# Patient Record
Sex: FEMALE | Race: WHITE | NOT HISPANIC OR LATINO | Employment: FULL TIME | URBAN - METROPOLITAN AREA
[De-identification: names, ages, dates, MRNs, and addresses within clinical notes are randomized per-mention and may not be internally consistent; named-entity substitution may affect disease eponyms.]

---

## 2023-08-14 DIAGNOSIS — E11.9 TYPE 2 DIABETES MELLITUS WITHOUT COMPLICATIONS (MULTI): ICD-10-CM

## 2023-08-17 RX ORDER — METFORMIN HYDROCHLORIDE 1000 MG/1
1000 TABLET ORAL 2 TIMES DAILY
Qty: 60 TABLET | Refills: 3 | Status: SHIPPED | OUTPATIENT
Start: 2023-08-17

## 2023-08-26 DIAGNOSIS — I10 ESSENTIAL (PRIMARY) HYPERTENSION: ICD-10-CM

## 2023-08-28 RX ORDER — METOPROLOL SUCCINATE 25 MG/1
12.5 TABLET, EXTENDED RELEASE ORAL DAILY
Qty: 90 TABLET | Refills: 3 | Status: SHIPPED | OUTPATIENT
Start: 2023-08-28

## 2023-10-21 DIAGNOSIS — E78.5 HYPERLIPIDEMIA, UNSPECIFIED: ICD-10-CM

## 2023-10-24 RX ORDER — ATORVASTATIN CALCIUM 40 MG/1
40 TABLET, FILM COATED ORAL DAILY
Qty: 90 TABLET | Refills: 3 | Status: SHIPPED | OUTPATIENT
Start: 2023-10-24

## 2024-02-23 DIAGNOSIS — E55.9 VITAMIN D DEFICIENCY, UNSPECIFIED: ICD-10-CM

## 2024-02-28 RX ORDER — LISINOPRIL 40 MG/1
40 TABLET ORAL DAILY
Qty: 90 TABLET | Refills: 3 | OUTPATIENT
Start: 2024-02-28

## 2024-02-28 NOTE — TELEPHONE ENCOUNTER
Lm for pt to call back, pt was seen for new pt physical on 3/06/2023, but never came back for DM follow up 3 months after.

## 2024-08-23 ENCOUNTER — APPOINTMENT (OUTPATIENT)
Dept: RADIOLOGY | Facility: HOSPITAL | Age: 44
DRG: 280 | End: 2024-08-23
Payer: COMMERCIAL

## 2024-08-23 ENCOUNTER — HOSPITAL ENCOUNTER (INPATIENT)
Facility: HOSPITAL | Age: 44
DRG: 280 | End: 2024-08-23
Attending: EMERGENCY MEDICINE | Admitting: STUDENT IN AN ORGANIZED HEALTH CARE EDUCATION/TRAINING PROGRAM
Payer: COMMERCIAL

## 2024-08-23 ENCOUNTER — APPOINTMENT (OUTPATIENT)
Dept: CARDIOLOGY | Facility: HOSPITAL | Age: 44
DRG: 280 | End: 2024-08-23
Payer: COMMERCIAL

## 2024-08-23 DIAGNOSIS — D72.829 LEUKOCYTOSIS, UNSPECIFIED TYPE: ICD-10-CM

## 2024-08-23 DIAGNOSIS — I50.21 ACUTE SYSTOLIC HEART FAILURE (MULTI): ICD-10-CM

## 2024-08-23 DIAGNOSIS — E11.9 TYPE 2 DIABETES MELLITUS WITHOUT COMPLICATIONS (MULTI): ICD-10-CM

## 2024-08-23 DIAGNOSIS — R11.2 NAUSEA AND VOMITING, UNSPECIFIED VOMITING TYPE: Primary | ICD-10-CM

## 2024-08-23 DIAGNOSIS — E11.10 DIABETIC KETOACIDOSIS WITHOUT COMA ASSOCIATED WITH TYPE 2 DIABETES MELLITUS (MULTI): ICD-10-CM

## 2024-08-23 DIAGNOSIS — I10 HYPERTENSION, UNSPECIFIED TYPE: ICD-10-CM

## 2024-08-23 DIAGNOSIS — E78.5 HYPERLIPIDEMIA, UNSPECIFIED: ICD-10-CM

## 2024-08-23 DIAGNOSIS — I21.4 NON-STEMI (NON-ST ELEVATED MYOCARDIAL INFARCTION) (MULTI): ICD-10-CM

## 2024-08-23 LAB
ALBUMIN SERPL-MCNC: 3.2 G/DL (ref 3.5–5)
ALBUMIN SERPL-MCNC: 3.9 G/DL (ref 3.5–5)
ALP BLD-CCNC: 103 U/L (ref 35–125)
ALT SERPL-CCNC: 21 U/L (ref 5–40)
ANION GAP SERPL CALC-SCNC: 16 MMOL/L
ANION GAP SERPL CALC-SCNC: >19 MMOL/L
APPEARANCE UR: CLEAR
APTT PPP: 25.7 SECONDS (ref 22–32.5)
AST SERPL-CCNC: 29 U/L (ref 5–40)
B-OH-BUTYR+ACETOACET BLD-SCNC: 3 MMOL/L (ref 0.1–0.3)
BACTERIA #/AREA URNS AUTO: ABNORMAL /HPF
BASE EXCESS BLDV CALC-SCNC: -5.5 MMOL/L (ref -2–3)
BASOPHILS # BLD AUTO: 0.11 X10*3/UL (ref 0–0.1)
BASOPHILS NFR BLD AUTO: 0.5 %
BILIRUB SERPL-MCNC: 0.6 MG/DL (ref 0.1–1.2)
BILIRUB UR STRIP.AUTO-MCNC: NEGATIVE MG/DL
BODY TEMPERATURE: 37 DEGREES CELSIUS
BUN SERPL-MCNC: 23 MG/DL (ref 8–25)
BUN SERPL-MCNC: 25 MG/DL (ref 8–25)
CALCIUM SERPL-MCNC: 10 MG/DL (ref 8.5–10.4)
CALCIUM SERPL-MCNC: 8.4 MG/DL (ref 8.5–10.4)
CHLORIDE SERPL-SCNC: 101 MMOL/L (ref 97–107)
CHLORIDE SERPL-SCNC: 98 MMOL/L (ref 97–107)
CO2 SERPL-SCNC: 17 MMOL/L (ref 24–31)
CO2 SERPL-SCNC: 18 MMOL/L (ref 24–31)
COLOR UR: ABNORMAL
CREAT SERPL-MCNC: 1.1 MG/DL (ref 0.4–1.6)
CREAT SERPL-MCNC: 1.2 MG/DL (ref 0.4–1.6)
EGFRCR SERPLBLD CKD-EPI 2021: 57 ML/MIN/1.73M*2
EGFRCR SERPLBLD CKD-EPI 2021: 64 ML/MIN/1.73M*2
EOSINOPHIL # BLD AUTO: 0.07 X10*3/UL (ref 0–0.7)
EOSINOPHIL NFR BLD AUTO: 0.3 %
ERYTHROCYTE [DISTWIDTH] IN BLOOD BY AUTOMATED COUNT: 15.2 % (ref 11.5–14.5)
GLUCOSE BLD MANUAL STRIP-MCNC: 145 MG/DL (ref 74–99)
GLUCOSE BLD MANUAL STRIP-MCNC: 189 MG/DL (ref 74–99)
GLUCOSE BLD MANUAL STRIP-MCNC: 211 MG/DL (ref 74–99)
GLUCOSE BLD MANUAL STRIP-MCNC: 296 MG/DL (ref 74–99)
GLUCOSE BLD MANUAL STRIP-MCNC: 303 MG/DL (ref 74–99)
GLUCOSE BLD MANUAL STRIP-MCNC: 332 MG/DL (ref 74–99)
GLUCOSE SERPL-MCNC: 341 MG/DL (ref 65–99)
GLUCOSE SERPL-MCNC: 426 MG/DL (ref 65–99)
GLUCOSE UR STRIP.AUTO-MCNC: ABNORMAL MG/DL
HCG UR QL IA.RAPID: NEGATIVE
HCO3 BLDV-SCNC: 17.5 MMOL/L (ref 22–26)
HCT VFR BLD AUTO: 44.4 % (ref 36–46)
HGB BLD-MCNC: 14.3 G/DL (ref 12–16)
HOLD SPECIMEN: NORMAL
HOLD SPECIMEN: NORMAL
HYALINE CASTS #/AREA URNS AUTO: ABNORMAL /LPF
IMM GRANULOCYTES # BLD AUTO: 0.11 X10*3/UL (ref 0–0.7)
IMM GRANULOCYTES NFR BLD AUTO: 0.5 % (ref 0–0.9)
INHALED O2 CONCENTRATION: 21 %
KETONES UR STRIP.AUTO-MCNC: ABNORMAL MG/DL
LACTATE BLDV-SCNC: 2.6 MMOL/L (ref 0.4–2)
LACTATE BLDV-SCNC: 2.9 MMOL/L (ref 0.4–2)
LACTATE BLDV-SCNC: 3.4 MMOL/L (ref 0.4–2)
LEUKOCYTE ESTERASE UR QL STRIP.AUTO: NEGATIVE
LIPASE SERPL-CCNC: 31 U/L (ref 16–63)
LYMPHOCYTES # BLD AUTO: 1.32 X10*3/UL (ref 1.2–4.8)
LYMPHOCYTES NFR BLD AUTO: 6.2 %
MAGNESIUM SERPL-MCNC: 1.5 MG/DL (ref 1.6–3.1)
MCH RBC QN AUTO: 26.5 PG (ref 26–34)
MCHC RBC AUTO-ENTMCNC: 32.2 G/DL (ref 32–36)
MCV RBC AUTO: 82 FL (ref 80–100)
MONOCYTES # BLD AUTO: 1.14 X10*3/UL (ref 0.1–1)
MONOCYTES NFR BLD AUTO: 5.3 %
MUCOUS THREADS #/AREA URNS AUTO: ABNORMAL /LPF
NEUTROPHILS # BLD AUTO: 18.65 X10*3/UL (ref 1.2–7.7)
NEUTROPHILS NFR BLD AUTO: 87.2 %
NITRITE UR QL STRIP.AUTO: NEGATIVE
NRBC BLD-RTO: 0 /100 WBCS (ref 0–0)
OXYHGB MFR BLDV: 84.1 % (ref 45–75)
PCO2 BLDV: 27 MM HG (ref 41–51)
PH BLDV: 7.42 PH (ref 7.33–7.43)
PH UR STRIP.AUTO: 6 [PH]
PHOSPHATE SERPL-MCNC: 2.1 MG/DL (ref 2.5–4.5)
PLATELET # BLD AUTO: 503 X10*3/UL (ref 150–450)
PO2 BLDV: 53 MM HG (ref 35–45)
POTASSIUM SERPL-SCNC: 3.5 MMOL/L (ref 3.4–5.1)
POTASSIUM SERPL-SCNC: 3.8 MMOL/L (ref 3.4–5.1)
PROT SERPL-MCNC: 7.9 G/DL (ref 5.9–7.9)
PROT UR STRIP.AUTO-MCNC: ABNORMAL MG/DL
RBC # BLD AUTO: 5.4 X10*6/UL (ref 4–5.2)
RBC # UR STRIP.AUTO: ABNORMAL /UL
RBC #/AREA URNS AUTO: ABNORMAL /HPF
SAO2 % BLDV: 86 % (ref 45–75)
SARS-COV-2 RNA RESP QL NAA+PROBE: NOT DETECTED
SODIUM SERPL-SCNC: 135 MMOL/L (ref 133–145)
SODIUM SERPL-SCNC: 135 MMOL/L (ref 133–145)
SP GR UR STRIP.AUTO: 1.04
SQUAMOUS #/AREA URNS AUTO: ABNORMAL /HPF
TROPONIN T SERPL-MCNC: 282 NG/L
TROPONIN T SERPL-MCNC: 297 NG/L
TSH SERPL DL<=0.05 MIU/L-ACNC: 0.3 MIU/L (ref 0.27–4.2)
UROBILINOGEN UR STRIP.AUTO-MCNC: NORMAL MG/DL
WBC # BLD AUTO: 21.4 X10*3/UL (ref 4.4–11.3)
WBC #/AREA URNS AUTO: ABNORMAL /HPF

## 2024-08-23 PROCEDURE — 96361 HYDRATE IV INFUSION ADD-ON: CPT

## 2024-08-23 PROCEDURE — 71045 X-RAY EXAM CHEST 1 VIEW: CPT | Performed by: RADIOLOGY

## 2024-08-23 PROCEDURE — 81003 URINALYSIS AUTO W/O SCOPE: CPT | Performed by: NURSE PRACTITIONER

## 2024-08-23 PROCEDURE — 2500000005 HC RX 250 GENERAL PHARMACY W/O HCPCS

## 2024-08-23 PROCEDURE — 2500000004 HC RX 250 GENERAL PHARMACY W/ HCPCS (ALT 636 FOR OP/ED): Performed by: NURSE PRACTITIONER

## 2024-08-23 PROCEDURE — 82805 BLOOD GASES W/O2 SATURATION: CPT | Performed by: EMERGENCY MEDICINE

## 2024-08-23 PROCEDURE — 81025 URINE PREGNANCY TEST: CPT | Performed by: NURSE PRACTITIONER

## 2024-08-23 PROCEDURE — 2500000004 HC RX 250 GENERAL PHARMACY W/ HCPCS (ALT 636 FOR OP/ED)

## 2024-08-23 PROCEDURE — 36415 COLL VENOUS BLD VENIPUNCTURE: CPT | Performed by: EMERGENCY MEDICINE

## 2024-08-23 PROCEDURE — 2500000004 HC RX 250 GENERAL PHARMACY W/ HCPCS (ALT 636 FOR OP/ED): Performed by: EMERGENCY MEDICINE

## 2024-08-23 PROCEDURE — 82947 ASSAY GLUCOSE BLOOD QUANT: CPT

## 2024-08-23 PROCEDURE — 84484 ASSAY OF TROPONIN QUANT: CPT | Performed by: EMERGENCY MEDICINE

## 2024-08-23 PROCEDURE — 93005 ELECTROCARDIOGRAM TRACING: CPT

## 2024-08-23 PROCEDURE — 99291 CRITICAL CARE FIRST HOUR: CPT | Mod: 25

## 2024-08-23 PROCEDURE — 80053 COMPREHEN METABOLIC PANEL: CPT | Performed by: NURSE PRACTITIONER

## 2024-08-23 PROCEDURE — 2500000002 HC RX 250 W HCPCS SELF ADMINISTERED DRUGS (ALT 637 FOR MEDICARE OP, ALT 636 FOR OP/ED)

## 2024-08-23 PROCEDURE — 83690 ASSAY OF LIPASE: CPT | Performed by: NURSE PRACTITIONER

## 2024-08-23 PROCEDURE — 83735 ASSAY OF MAGNESIUM: CPT

## 2024-08-23 PROCEDURE — 82010 KETONE BODYS QUAN: CPT | Performed by: EMERGENCY MEDICINE

## 2024-08-23 PROCEDURE — 74177 CT ABD & PELVIS W/CONTRAST: CPT | Performed by: RADIOLOGY

## 2024-08-23 PROCEDURE — 96375 TX/PRO/DX INJ NEW DRUG ADDON: CPT

## 2024-08-23 PROCEDURE — 82810 BLOOD GASES O2 SAT ONLY: CPT | Performed by: EMERGENCY MEDICINE

## 2024-08-23 PROCEDURE — 93010 ELECTROCARDIOGRAM REPORT: CPT | Performed by: INTERNAL MEDICINE

## 2024-08-23 PROCEDURE — 87040 BLOOD CULTURE FOR BACTERIA: CPT | Mod: WESLAB | Performed by: NURSE PRACTITIONER

## 2024-08-23 PROCEDURE — 85025 COMPLETE CBC W/AUTO DIFF WBC: CPT | Performed by: NURSE PRACTITIONER

## 2024-08-23 PROCEDURE — 2550000001 HC RX 255 CONTRASTS: Performed by: EMERGENCY MEDICINE

## 2024-08-23 PROCEDURE — 83605 ASSAY OF LACTIC ACID: CPT | Performed by: EMERGENCY MEDICINE

## 2024-08-23 PROCEDURE — 83605 ASSAY OF LACTIC ACID: CPT

## 2024-08-23 PROCEDURE — 2500000002 HC RX 250 W HCPCS SELF ADMINISTERED DRUGS (ALT 637 FOR MEDICARE OP, ALT 636 FOR OP/ED): Performed by: EMERGENCY MEDICINE

## 2024-08-23 PROCEDURE — 85730 THROMBOPLASTIN TIME PARTIAL: CPT | Performed by: EMERGENCY MEDICINE

## 2024-08-23 PROCEDURE — 74177 CT ABD & PELVIS W/CONTRAST: CPT

## 2024-08-23 PROCEDURE — 81001 URINALYSIS AUTO W/SCOPE: CPT | Performed by: NURSE PRACTITIONER

## 2024-08-23 PROCEDURE — 99291 CRITICAL CARE FIRST HOUR: CPT

## 2024-08-23 PROCEDURE — 87635 SARS-COV-2 COVID-19 AMP PRB: CPT | Performed by: NURSE PRACTITIONER

## 2024-08-23 PROCEDURE — 71275 CT ANGIOGRAPHY CHEST: CPT

## 2024-08-23 PROCEDURE — 96365 THER/PROPH/DIAG IV INF INIT: CPT

## 2024-08-23 PROCEDURE — 2500000001 HC RX 250 WO HCPCS SELF ADMINISTERED DRUGS (ALT 637 FOR MEDICARE OP)

## 2024-08-23 PROCEDURE — 80069 RENAL FUNCTION PANEL: CPT | Mod: CCI

## 2024-08-23 PROCEDURE — 84443 ASSAY THYROID STIM HORMONE: CPT

## 2024-08-23 PROCEDURE — 71045 X-RAY EXAM CHEST 1 VIEW: CPT

## 2024-08-23 PROCEDURE — 2020000001 HC ICU ROOM DAILY

## 2024-08-23 PROCEDURE — 71275 CT ANGIOGRAPHY CHEST: CPT | Performed by: RADIOLOGY

## 2024-08-23 RX ORDER — POTASSIUM CHLORIDE 14.9 MG/ML
20 INJECTION INTRAVENOUS
Status: DISPENSED | OUTPATIENT
Start: 2024-08-23 | End: 2024-08-23

## 2024-08-23 RX ORDER — DEXTROSE MONOHYDRATE AND SODIUM CHLORIDE 5; .45 G/100ML; G/100ML
150 INJECTION, SOLUTION INTRAVENOUS CONTINUOUS
Status: DISCONTINUED | OUTPATIENT
Start: 2024-08-23 | End: 2024-08-24

## 2024-08-23 RX ORDER — ONDANSETRON HYDROCHLORIDE 2 MG/ML
4 INJECTION, SOLUTION INTRAVENOUS ONCE
Status: COMPLETED | OUTPATIENT
Start: 2024-08-23 | End: 2024-08-23

## 2024-08-23 RX ORDER — POTASSIUM CHLORIDE 20 MEQ/1
40 TABLET, EXTENDED RELEASE ORAL ONCE
Status: COMPLETED | OUTPATIENT
Start: 2024-08-23 | End: 2024-08-23

## 2024-08-23 RX ORDER — DEXTROSE MONOHYDRATE 100 MG/ML
150 INJECTION, SOLUTION INTRAVENOUS CONTINUOUS
Status: DISCONTINUED | OUTPATIENT
Start: 2024-08-23 | End: 2024-08-24

## 2024-08-23 RX ORDER — DEXTROSE 50 % IN WATER (D50W) INTRAVENOUS SYRINGE
50
Status: DISCONTINUED | OUTPATIENT
Start: 2024-08-23 | End: 2024-08-26 | Stop reason: HOSPADM

## 2024-08-23 RX ORDER — HEPARIN SODIUM 10000 [USP'U]/100ML
0-4000 INJECTION, SOLUTION INTRAVENOUS CONTINUOUS
Status: DISCONTINUED | OUTPATIENT
Start: 2024-08-23 | End: 2024-08-23 | Stop reason: SDUPTHER

## 2024-08-23 RX ORDER — ACETAMINOPHEN 325 MG/1
650 TABLET ORAL EVERY 4 HOURS PRN
Status: DISCONTINUED | OUTPATIENT
Start: 2024-08-23 | End: 2024-08-26 | Stop reason: HOSPADM

## 2024-08-23 RX ORDER — HEPARIN SODIUM 5000 [USP'U]/ML
3000-4000 INJECTION, SOLUTION INTRAVENOUS; SUBCUTANEOUS AS NEEDED
Status: DISCONTINUED | OUTPATIENT
Start: 2024-08-23 | End: 2024-08-23

## 2024-08-23 RX ORDER — LISINOPRIL 40 MG/1
40 TABLET ORAL DAILY
COMMUNITY
End: 2024-08-26 | Stop reason: HOSPADM

## 2024-08-23 RX ORDER — SODIUM CHLORIDE 450 MG/100ML
250 INJECTION, SOLUTION INTRAVENOUS CONTINUOUS
Status: DISCONTINUED | OUTPATIENT
Start: 2024-08-23 | End: 2024-08-24

## 2024-08-23 RX ORDER — LABETALOL HYDROCHLORIDE 5 MG/ML
20 INJECTION, SOLUTION INTRAVENOUS EVERY 4 HOURS PRN
Status: DISCONTINUED | OUTPATIENT
Start: 2024-08-23 | End: 2024-08-26 | Stop reason: HOSPADM

## 2024-08-23 RX ORDER — MAGNESIUM SULFATE HEPTAHYDRATE 40 MG/ML
2 INJECTION, SOLUTION INTRAVENOUS ONCE
Status: COMPLETED | OUTPATIENT
Start: 2024-08-23 | End: 2024-08-23

## 2024-08-23 RX ORDER — FAMOTIDINE 10 MG/ML
20 INJECTION INTRAVENOUS ONCE
Status: COMPLETED | OUTPATIENT
Start: 2024-08-23 | End: 2024-08-23

## 2024-08-23 RX ORDER — POTASSIUM CHLORIDE 20 MEQ/1
40 TABLET, EXTENDED RELEASE ORAL ONCE
Status: DISCONTINUED | OUTPATIENT
Start: 2024-08-23 | End: 2024-08-23

## 2024-08-23 RX ORDER — HEPARIN SODIUM 10000 [USP'U]/100ML
0-4000 INJECTION, SOLUTION INTRAVENOUS CONTINUOUS
Status: DISCONTINUED | OUTPATIENT
Start: 2024-08-23 | End: 2024-08-23

## 2024-08-23 RX ORDER — ONDANSETRON HYDROCHLORIDE 2 MG/ML
4 INJECTION, SOLUTION INTRAVENOUS EVERY 8 HOURS PRN
Status: DISCONTINUED | OUTPATIENT
Start: 2024-08-23 | End: 2024-08-26 | Stop reason: HOSPADM

## 2024-08-23 RX ORDER — HEPARIN SODIUM 5000 [USP'U]/ML
4000 INJECTION, SOLUTION INTRAVENOUS; SUBCUTANEOUS ONCE
Status: DISCONTINUED | OUTPATIENT
Start: 2024-08-23 | End: 2024-08-25

## 2024-08-23 RX ORDER — METOPROLOL TARTRATE 25 MG/1
12.5 TABLET, FILM COATED ORAL 2 TIMES DAILY
Status: DISCONTINUED | OUTPATIENT
Start: 2024-08-23 | End: 2024-08-24

## 2024-08-23 RX ORDER — ONDANSETRON 4 MG/1
4 TABLET, FILM COATED ORAL EVERY 8 HOURS PRN
Status: DISCONTINUED | OUTPATIENT
Start: 2024-08-23 | End: 2024-08-26 | Stop reason: HOSPADM

## 2024-08-23 RX ORDER — SODIUM CHLORIDE AND POTASSIUM CHLORIDE 300; 900 MG/100ML; MG/100ML
250 INJECTION, SOLUTION INTRAVENOUS CONTINUOUS
Status: DISCONTINUED | OUTPATIENT
Start: 2024-08-23 | End: 2024-08-23

## 2024-08-23 RX ORDER — ATORVASTATIN CALCIUM 40 MG/1
40 TABLET, FILM COATED ORAL NIGHTLY
Status: DISCONTINUED | OUTPATIENT
Start: 2024-08-24 | End: 2024-08-26 | Stop reason: HOSPADM

## 2024-08-23 RX ORDER — ACETAMINOPHEN 160 MG/5ML
650 SOLUTION ORAL EVERY 4 HOURS PRN
Status: DISCONTINUED | OUTPATIENT
Start: 2024-08-23 | End: 2024-08-26 | Stop reason: HOSPADM

## 2024-08-23 SDOH — SOCIAL STABILITY: SOCIAL INSECURITY: HAVE YOU HAD ANY THOUGHTS OF HARMING ANYONE ELSE?: NO

## 2024-08-23 SDOH — SOCIAL STABILITY: SOCIAL INSECURITY: DOES ANYONE TRY TO KEEP YOU FROM HAVING/CONTACTING OTHER FRIENDS OR DOING THINGS OUTSIDE YOUR HOME?: NO

## 2024-08-23 SDOH — SOCIAL STABILITY: SOCIAL INSECURITY: DO YOU FEEL UNSAFE GOING BACK TO THE PLACE WHERE YOU ARE LIVING?: NO

## 2024-08-23 SDOH — SOCIAL STABILITY: SOCIAL INSECURITY: DO YOU FEEL ANYONE HAS EXPLOITED OR TAKEN ADVANTAGE OF YOU FINANCIALLY OR OF YOUR PERSONAL PROPERTY?: NO

## 2024-08-23 SDOH — SOCIAL STABILITY: SOCIAL INSECURITY: ARE YOU OR HAVE YOU BEEN THREATENED OR ABUSED PHYSICALLY, EMOTIONALLY, OR SEXUALLY BY ANYONE?: NO

## 2024-08-23 SDOH — SOCIAL STABILITY: SOCIAL INSECURITY: ABUSE: ADULT

## 2024-08-23 SDOH — SOCIAL STABILITY: SOCIAL INSECURITY: WERE YOU ABLE TO COMPLETE ALL THE BEHAVIORAL HEALTH SCREENINGS?: YES

## 2024-08-23 SDOH — SOCIAL STABILITY: SOCIAL INSECURITY: HAS ANYONE EVER THREATENED TO HURT YOUR FAMILY OR YOUR PETS?: NO

## 2024-08-23 SDOH — SOCIAL STABILITY: SOCIAL INSECURITY: HAVE YOU HAD THOUGHTS OF HARMING ANYONE ELSE?: NO

## 2024-08-23 SDOH — SOCIAL STABILITY: SOCIAL INSECURITY: ARE THERE ANY APPARENT SIGNS OF INJURIES/BEHAVIORS THAT COULD BE RELATED TO ABUSE/NEGLECT?: NO

## 2024-08-23 ASSESSMENT — COGNITIVE AND FUNCTIONAL STATUS - GENERAL
DAILY ACTIVITIY SCORE: 24
PATIENT BASELINE BEDBOUND: NO
MOBILITY SCORE: 24

## 2024-08-23 ASSESSMENT — PATIENT HEALTH QUESTIONNAIRE - PHQ9
SUM OF ALL RESPONSES TO PHQ9 QUESTIONS 1 & 2: 0
1. LITTLE INTEREST OR PLEASURE IN DOING THINGS: NOT AT ALL
2. FEELING DOWN, DEPRESSED OR HOPELESS: NOT AT ALL

## 2024-08-23 ASSESSMENT — COLUMBIA-SUICIDE SEVERITY RATING SCALE - C-SSRS
2. HAVE YOU ACTUALLY HAD ANY THOUGHTS OF KILLING YOURSELF?: NO
6. HAVE YOU EVER DONE ANYTHING, STARTED TO DO ANYTHING, OR PREPARED TO DO ANYTHING TO END YOUR LIFE?: NO
1. IN THE PAST MONTH, HAVE YOU WISHED YOU WERE DEAD OR WISHED YOU COULD GO TO SLEEP AND NOT WAKE UP?: NO

## 2024-08-23 ASSESSMENT — ACTIVITIES OF DAILY LIVING (ADL)
TOILETING: INDEPENDENT
JUDGMENT_ADEQUATE_SAFELY_COMPLETE_DAILY_ACTIVITIES: YES
DRESSING YOURSELF: INDEPENDENT
PATIENT'S MEMORY ADEQUATE TO SAFELY COMPLETE DAILY ACTIVITIES?: YES
BATHING: INDEPENDENT
HEARING - LEFT EAR: FUNCTIONAL
LACK_OF_TRANSPORTATION: PATIENT DECLINED
HEARING - RIGHT EAR: FUNCTIONAL
ADEQUATE_TO_COMPLETE_ADL: YES
WALKS IN HOME: INDEPENDENT
GROOMING: INDEPENDENT
FEEDING YOURSELF: INDEPENDENT

## 2024-08-23 ASSESSMENT — PAIN SCALES - GENERAL
PAINLEVEL_OUTOF10: 0 - NO PAIN
PAINLEVEL_OUTOF10: 0 - NO PAIN

## 2024-08-23 ASSESSMENT — LIFESTYLE VARIABLES
AUDIT-C TOTAL SCORE: 1
SKIP TO QUESTIONS 9-10: 1
AUDIT-C TOTAL SCORE: 1
HOW MANY STANDARD DRINKS CONTAINING ALCOHOL DO YOU HAVE ON A TYPICAL DAY: 1 OR 2
HOW OFTEN DO YOU HAVE A DRINK CONTAINING ALCOHOL: MONTHLY OR LESS
HOW OFTEN DO YOU HAVE 6 OR MORE DRINKS ON ONE OCCASION: NEVER

## 2024-08-23 ASSESSMENT — PAIN - FUNCTIONAL ASSESSMENT
PAIN_FUNCTIONAL_ASSESSMENT: 0-10
PAIN_FUNCTIONAL_ASSESSMENT: 0-10

## 2024-08-23 ASSESSMENT — PAIN DESCRIPTION - PROGRESSION: CLINICAL_PROGRESSION: NOT CHANGED

## 2024-08-23 NOTE — PROGRESS NOTES
Sepsis Alert @ 1508    Pt in with nausea/vomiting/diarrhea  -Hr 132, , WBC 21.4, Lactate 3.4    -Zosyn 4.5 gm IV given @ 1517  -3,870 mls (30 mls/kg) of NS IV given @ 1431     Angela Ventura, PharmD

## 2024-08-23 NOTE — H&P
[facility] Critical Care Medicine       Date:  8/23/2024  Patient:  Josefina Huerta  YOB: 1980  MRN:  [id number]   Admit Date:  8/23/2024    Chief Complaint   Patient presents with    Vomiting    Nausea         History of Present Illness:  Josefina Huerta is a 43 y.o. year old female patient with past medical history of DM2, HTN, HLD who presented to  ED 8/23 with complaints of N/V/D and general malaise for the last 3 days. She is nauseous and vomits with any PO intake and she is also having frequent loose stools. She has not been taking her metformin recently due to insurance issues. She denies chest pain, SOB, fever, chills. Nothing makes her symptoms better or worse.     ED Course: Initial VS- , /113 (127), RR 19, SpO2 98% on RA. Labs significant for WBC 21.4 - predominantly neutrophils, plts 503, serial troponins 297 -> 282, UA significant for 4+ glucose, 2+ ketones, 3+ proteinuria, without signs of infections, glucose 426, bicarb 17, AG >19, lactate 3.7, pH 7.42/21, BHB 3.00. She was given zofran, pepcid, zosyn and NS at 30cc/kg for a total of 3.870L, she was also given an additional 1L of NS. CXR showing no acute cardiopulmonary process. EKG showing sinus tachycardia with no ST elevations but some T wave inversions. CT abd/pel showing hepatic steatosis, otherwise without abnormality.       Interval ICU Events:  8/23: pt admitted to ICU on insulin gtt for DKA.     Medical History:  No past medical history on file.  No past surgical history on file.  (Not in a hospital admission)    Patient has no known allergies.     No family history on file.    Hospital Medications:    dextrose 10 % in water (D10W), 150 mL/hr  dextrose 10 % in water (D10W), 150 mL/hr  dextrose 5%-0.45 % sodium chloride, 150 mL/hr  potassium chloride in 0.9%NaCl, 250 mL/hr          Current Facility-Administered Medications:     dextrose 10 % in water (D10W) infusion, 150 mL/hr, intravenous, Continuous, Carlene  "Saige, APRN-CNP    dextrose 10 % in water (D10W) infusion, 150 mL/hr, intravenous, Continuous, FRANKY Caruso    dextrose 5%-0.45 % sodium chloride infusion, 150 mL/hr, intravenous, Continuous, FRANKY Caruso    dextrose 50 % injection 50 mL, 50 mL, intravenous, q15 min PRN, FRANKY Caruso    sodium chloride 0.9 % with KCl 40 mEq/L infusion, 250 mL/hr, intravenous, Continuous, Tr Burden PA-C    Current Outpatient Medications:     atorvastatin (Lipitor) 40 mg tablet, TAKE 1 TABLET BY MOUTH ONCE DAILY, Disp: 90 tablet, Rfl: 3    metFORMIN (Glucophage) 1,000 mg tablet, TAKE 1 TABLET BY MOUTH TWICE DAILY, Disp: 60 tablet, Rfl: 3    metoprolol succinate XL (Toprol-XL) 25 mg 24 hr tablet, take 1/2 tablet by mouth daily, Disp: 90 tablet, Rfl: 3    Review of Systems:  14 point review of systems was completed and negative except for those specially mention in my HPI    Physical Exam:    Heart Rate:  [127-132]   Temperature:  [36.5 °C (97.7 °F)]   Respirations:  [18-19]   BP: (154-186)/(113-129)   Height:  [172.7 cm (5' 8\")]   Weight:  [129 kg (285 lb)]   Pulse Ox:  [93 %-98 %]     Physical Exam  Constitutional:       General: She is not in acute distress.     Appearance: She is ill-appearing.   HENT:      Head: Normocephalic and atraumatic.   Eyes:      Extraocular Movements: Extraocular movements intact.      Pupils: Pupils are equal, round, and reactive to light.   Cardiovascular:      Rate and Rhythm: Regular rhythm. Tachycardia present.      Heart sounds: Normal heart sounds. No murmur heard.  Pulmonary:      Effort: No respiratory distress.   Abdominal:      Tenderness: There is no abdominal tenderness.   Musculoskeletal:      Cervical back: No tenderness.      Right lower leg: No edema.      Left lower leg: No edema.   Skin:     General: Skin is warm and dry.   Neurological:      General: No focal deficit present.      Mental Status: She is alert and oriented to person, place, " and time.         Objective:    I have reviewed all medications, laboratory results, and imaging pertinent for today's encounter.         No intake or output data in the 24 hours ending 08/23/24 1143      Assessment/Plan:    I am currently managing this critically ill patient for the following problems:    Neuro/Psych/Pain Ctrl/Sedation: pt is AOx4  No active concerns   - Pain Control: acetaminophen PRN  - CAM ICU qshift, sleep-wake hygiene, delirium precautions     Respiratory/ENT:  No active concerns   - Supplemental O2: none, on room air   - Maintain SpO2 >92%  - Continuous pulse ox monitoring   - Pulm hygiene    Cardiovascular:   Hypertensive urgency   Sinus tachycardia likely 2/2 dehydration   NSTEMI   - Continue home metoprolol, atorvastatin   - Labetalol PRN for SBP >180   - Heparin gtt initiated in the setting of NSTEMI  - CT angio pending to r/o PE   - TTE ordered   - Cardiology consulted   - Maintain MAPs >65  - Continuous cardiac monitoring per ICU protocol  - EKGs PRN for ACS symptoms, arrhythmias     GI:  Nausea, vomiting, diarrhea   - Diet: NPO while on insulin gtt  - BR: none  - GI Prophylaxis: not indicated   - PRN zofran     Renal/Volume Status/Electrolytes:  Lactic acidosis likely 2/2 dehydration d/t DKA  - Baseline Cr ~1.1  - Maintain urine output 0.5-1.0cc/kg/hr  - Trend lactate q4hrs until normal   - Hourly I/O's  - Replete electrolytes to maintain K >4.0 and Mg >2.0  - Daily BMP, Mg, Phos    Endocrine:  DKA vs HHS   - Received 10 units regular insulin in ED   - Start insulin gtt without bolus   - POCT glucose q1hr, RFP q4hrs  - IV fluids per DKA protocol   - Hypoglycemia protocol PRN     Infectious Disease:  No active concerns   - Antibiotics: received a dose of zosyn in ED, further abx not indicated at this time   - Monitor SIRS criteria    Heme/Onc:  Thrombocytosis - likely hemoconcentration in the setting of dehydration   - Continue heparin gtt for NSTEMI   - Monitor for s/sx of anemia such  as bleeding and bruising   - Transfuse if Hgb <7.0   - Daily CBC    MSK:  No active concerns   - Ambulatory at baseline    Derm:  - ICU skin protocol  - Padded pressure points     Ethics/Code Status:  Full code     :  DVT Prophylaxis: heparin gtt   GI Prophylaxis: none  Bowel Regimen: none  Diet: NPO  CVC: none  Fabiola: none  Donato: none  Restraints: none  Disposition: ICU    Critical Care Time:  40 minutes spent in preparing to see patient (I.e. labs, imaging, etc.), documentation, discussion plan of care with patient/family/caregiver, and/or coordination of care with multidisciplinary team including the attending. Time does not include completion of procedure time.     Plan discussed with Dr. Mejia.     Tr Burden PA-C  Pulmonary & Critical Care Medicine   [facility]

## 2024-08-23 NOTE — LETTER
August 26, 2024     Patient: Josefina Huerta   YOB: 1980   Date of Visit: 8/23/2024       To Whom It May Concern:    Josefina Huerta was admitted under my care at [facility] from 8/23/24-8/26/24, please excuse her from work for these days. It is my medical opinion that Josefina Huerta may return to work on 9/2/24 .    If you have any questions or concerns, please don't hesitate to call.         Sincerely,          Gladys Sy MD

## 2024-08-23 NOTE — ED TRIAGE NOTES
Pt c/o diarrhea and vomiting starting Wednesday. States she has had nausea since and has been unable to keep anything down.

## 2024-08-23 NOTE — ED PROVIDER NOTES
HPI   Chief Complaint   Patient presents with    Vomiting    Nausea       HPI  See my MDM      Patient History   No past medical history on file.  No past surgical history on file.  No family history on file.  Social History     Tobacco Use    Smoking status: Not on file    Smokeless tobacco: Not on file   Substance Use Topics    Alcohol use: Not on file    Drug use: Not on file       Physical Exam   ED Triage Vitals [08/23/24 1354]   Temperature Heart Rate Respirations BP   36.5 °C (97.7 °F) (!) 132 19 (!) 154/113      Pulse Ox Temp Source Heart Rate Source Patient Position   98 % Oral Monitor Sitting      BP Location FiO2 (%)     Right arm --       Physical Exam  CONSTITUTIONAL: Vital signs reviewed as charted, well-developed and in no distress  Eyes: Extraocular muscles are intact. Pupils equal round and reactive to light. Conjunctiva are pink.    ENT: Mucous membranes are moist. Tongue in the midline. Pharynx was without erythema or exudates, uvula midline  LUNGS: Breath sounds equal and clear to auscultation. Good air exchange, no wheezes rales or retractions, pulse oximetry is charted.  HEART: Tachycardic rate and rhythm without murmur thrill or rub, strong tones, auscultation is normal.  ABDOMEN: Mild epigastric tenderness, no rebound or guarding noted.  Abdomen soft  Neuro: The patient is awake, alert and oriented ×3. Moving all 4 extremities and answering questions appropriately.   MUSCULOSKELETAL: The calves are nontender to palpation. Full gross active range of motion.   PSYCH: Awake alert oriented, normal mood and affect.  Skin:  Dry, normal color, warm to the touch, no rash present.        ED Course & MDM   ED Course as of 08/23/24 1754   Fri Aug 23, 2024   1511 Patient's lactic acid 3.4, leukocytosis of 22,000.  IV antibiotics ordered blood cultures ordered.  Sepsis fluids were already ordered. [RJ]      ED Course User Index  [RJ] BRENTON Diez-CNP         Diagnoses as of 08/23/24 1754   Nausea  "and vomiting, unspecified vomiting type   Hypertension, unspecified type   Non-STEMI (non-ST elevated myocardial infarction) (Multi)   Diabetic ketoacidosis without coma associated with type 2 diabetes mellitus (Multi)   Leukocytosis, unspecified type                 No data recorded     [place] Coma Scale Score: 15 (08/23/24 1514 : Violetta Javier RN)                           Medical Decision Making  History obtained from: patient    Vital signs, nursing notes, current medications, past medical history, Surgical history, allergies, social history, family History were reviewed.         HPI:  Patient 43-year-old female presenting emergency room today planing of nausea vomiting diarrhea.  States started 2 days ago diarrhea stopped after day 1 but she is so nauseous she cannot keep anything down.  Denies cough or congestion.  Denies fever chills or night sweats.  No shoulder complaints.  Nontoxic well-appearing vital signs positive for hypertension and tachycardia but otherwise unremarkable.  Patient does appear anxious.      10 point ROS was reviewed and negative except Noted above in HPI.  DDX: as listed above          MDM Summary/considerations:  EMERGENCY DEPARTMENT COURSE and DIFFERENTIAL DIAGNOSIS/MDM:    The patient presented with a chief complaint of nausea vomiting and diarrhea. The differential diagnosis associated with this patient's presentation includes gastritis, viral syndrome, cholecystitis, pancreatitis.     Vitals:    Vitals:    08/23/24 1354 08/23/24 1514 08/23/24 1601   BP: (!) 154/113  (!) 186/129   BP Location: Right arm  Right arm   Patient Position: Sitting  Lying   Pulse: (!) 132  (!) 127   Resp: 19  18   Temp: 36.5 °C (97.7 °F)  36.5 °C (97.7 °F)   TempSrc: Oral     SpO2: 98% 98% (!) 93%   Weight: 129 kg (285 lb)     Height: 1.727 m (5' 8\")         ED Course as of 08/23/24 1754   Fri Aug 23, 2024   1511 Patient's lactic acid 3.4, leukocytosis of 22,000.  IV antibiotics ordered blood " cultures ordered.  Sepsis fluids were already ordered. [RJ]      ED Course User Index  [RJ] Oli Cleveland, APRN-CNP         Diagnoses as of 08/23/24 1754   Nausea and vomiting, unspecified vomiting type   Hypertension, unspecified type   Non-STEMI (non-ST elevated myocardial infarction) (Multi)   Diabetic ketoacidosis without coma associated with type 2 diabetes mellitus (Multi)   Leukocytosis, unspecified type       History Limited by:    None    Independent history obtained from:    None    External records reviewed:    None    Diagnostics interpreted by me:    EKG interpreted by my attending physician and CT Scan(s) of the abdomen and pelvis    Discussions with other clinicians:    Hospitalist Dr. Herndon, ICU intensivist Dr. Mejia    Chronic conditions impacting care:    None    Social determinants of health affecting care:    None    Diagnostic tests considered but not performed: none    ED Medications managed:    Medications   dextrose 10 % in water (D10W) infusion (has no administration in time range)   dextrose 50 % injection 50 mL (has no administration in time range)   dextrose 10 % in water (D10W) infusion (has no administration in time range)   dextrose 5%-0.45 % sodium chloride infusion (has no administration in time range)   heparin (porcine) injection 4,000 Units (has no administration in time range)   heparin 25,000 Units in dextrose 5% 250 mL (100 Units/mL) infusion (premix) (has no administration in time range)   heparin (porcine) injection 3,000-4,000 Units (has no administration in time range)   sodium chloride 0.45 % infusion (has no administration in time range)   famotidine PF (Pepcid) injection 20 mg (20 mg intravenous Given 8/23/24 1425)   ondansetron (Zofran) injection 4 mg (4 mg intravenous Given 8/23/24 1425)   sodium chloride 0.9 % bolus 1,000 mL (0 mL intravenous Stopped 8/23/24 1609)   sodium chloride 0.9 % bolus 3,870 mL (0 mL intravenous Stopped 8/23/24 1609)    piperacillin-tazobactam (Zosyn) 4.5 g in dextrose (iso)  mL (0 g intravenous Stopped 8/23/24 1609)   iohexol (OMNIPaque) 350 mg iodine/mL solution 75 mL (75 mL intravenous Given 8/23/24 1550)   promethazine (Phenergan) 12.5 mg in sodium chloride 0.9% 50 mL IV (12.5 mg intravenous Given 8/23/24 1627)   insulin regular (HumuLIN R,NovoLIN R) injection 10 Units (10 Units intravenous Given 8/23/24 1724)       Prescription drugs considered:    None    Screenings:          Labs Reviewed   URINALYSIS WITH REFLEX MICROSCOPIC - Abnormal       Result Value    Color, Urine Light-Yellow      Appearance, Urine Clear      Specific Gravity, Urine 1.038 (*)     pH, Urine 6.0      Protein, Urine 600 (3+) (*)     Glucose, Urine OVER (4+) (*)     Blood, Urine 0.2 (2+) (*)     Ketones, Urine 60 (2+) (*)     Bilirubin, Urine NEGATIVE      Urobilinogen, Urine Normal      Nitrite, Urine NEGATIVE      Leukocyte Esterase, Urine NEGATIVE      Narrative:     OVER is reported when the result is greater than the clinically reportable range.   COMPREHENSIVE METABOLIC PANEL - Abnormal    Glucose 426 (*)     Sodium 135      Potassium 3.8      Chloride 98      Bicarbonate 17 (*)     Urea Nitrogen 25      Creatinine 1.20      eGFR 57 (*)     Calcium 10.0      Albumin 3.9      Alkaline Phosphatase 103      Total Protein 7.9      AST 29      Bilirubin, Total 0.6      ALT 21      Anion Gap >19 (*)    CBC WITH AUTO DIFFERENTIAL - Abnormal    WBC 21.4 (*)     nRBC 0.0      RBC 5.40 (*)     Hemoglobin 14.3      Hematocrit 44.4      MCV 82      MCH 26.5      MCHC 32.2      RDW 15.2 (*)     Platelets 503 (*)     Neutrophils % 87.2      Immature Granulocytes %, Automated 0.5      Lymphocytes % 6.2      Monocytes % 5.3      Eosinophils % 0.3      Basophils % 0.5      Neutrophils Absolute 18.65 (*)     Immature Granulocytes Absolute, Automated 0.11      Lymphocytes Absolute 1.32      Monocytes Absolute 1.14 (*)     Eosinophils Absolute 0.07       Basophils Absolute 0.11 (*)    BLOOD GAS LACTIC ACID, VENOUS - Abnormal    POCT Lactate, Venous 3.4 (*)    SERIAL TROPONIN, INITIAL ([facility]) - Abnormal    Troponin T, High Sensitivity 297 (*)    SERIAL TROPONIN, 6 HOUR ([facility]) - Abnormal    Troponin T, High Sensitivity 282 (*)    MICROSCOPIC ONLY, URINE - Abnormal    WBC, Urine 11-20 (*)     RBC, Urine 6-10 (*)     Squamous Epithelial Cells, Urine 1-9 (SPARSE)      Bacteria, Urine 1+ (*)     Mucus, Urine FEW      Hyaline Casts, Urine 3+ (*)    BETA HYDROXYBUTYRATE - Abnormal    Beta-Hydroxybutyrate 3.00 (*)     Narrative:     Values exceeding 0.27 mmol/L indicate ketosis.   BLOOD GAS VENOUS - Abnormal    POCT pH, Venous 7.42      POCT pCO2, Venous 27 (*)     POCT pO2, Venous 53 (*)     POCT SO2, Venous 86 (*)     POCT Oxy Hemoglobin, Venous 84.1 (*)     POCT Base Excess, Venous -5.5 (*)     POCT HCO3 Calculated, Venous 17.5 (*)     Patient Temperature 37.0      FiO2 21     BLOOD GAS LACTIC ACID, VENOUS - Abnormal    POCT Lactate, Venous 2.6 (*)    POCT GLUCOSE - Abnormal    POCT Glucose 332 (*)    LIPASE - Normal    Lipase 31     HCG, URINE, QUALITATIVE - Normal    HCG, Urine NEGATIVE     SARS-COV-2 PCR - Normal    Coronavirus 2019, PCR Not Detected      Narrative:     This assay has received FDA Emergency Use Authorization (EUA) and is only authorized for the duration of time that circumstances exist to justify the authorization of the emergency use of in vitro diagnostic tests for the detection of SARS-CoV-2 virus and/or diagnosis of COVID-19 infection under section 564(b)(1) of the Act, 21 U.S.C. 360bbb-3(b)(1). This assay is an in vitro diagnostic nucleic acid amplification test for the qualitative detection of SARS-CoV-2 from nasopharyngeal specimens and has been validated for use at [facility]. Negative results do not preclude COVID-19 infections and should not be used as the sole basis for diagnosis, treatment, or other management  decisions.     BLOOD CULTURE   BLOOD CULTURE   TROPONIN T SERIES, HIGH SENSITIVITY (0, 2 HR, 6 HR)    Narrative:     The following orders were created for panel order Troponin T Series, High Sensitivity (0, 2HR, 6HR).  Procedure                               Abnormality         Status                     ---------                               -----------         ------                     Serial Troponin, Initial...[[id number]]  Abnormal            Final result               Serial Troponin, 2 Hour ...[[id number]]                                                 Serial Troponin, 6 Hour ...[[id number]]  Abnormal            Final result                 Please view results for these tests on the individual orders.   SERIAL TROPONIN,  2 HOUR ([facility])   BLOOD GAS LACTIC ACID, VENOUS   BLOOD GAS LACTIC ACID, VENOUS   MAGNESIUM   PHOSPHORUS   APTT   CBC   POCT GLUCOSE METER   POCT GLUCOSE METER   POCT GLUCOSE METER   POCT GLUCOSE METER   POCT GLUCOSE METER   POCT GLUCOSE METER   POCT GLUCOSE METER     CT abdomen pelvis w IV contrast   Final Result   1.  Hepatic steatosis. No bowel obstruction. No radiopaque   gallstones. No acute inflammatory bowel process detected   2. Normal appendix. No diverticulitis             MACRO:   None        Signed by: Kg Srinivasan 8/23/2024 4:39 PM   Dictation workstation:   [id number]      XR chest 1 view   Final Result   1. No acute cardiopulmonary process.        MACRO:   None.        Signed by: Meseret Villanueva 8/23/2024 2:42 PM   Dictation workstation:   [id number]      CT angio chest for pulmonary embolism    (Results Pending)     Medications   dextrose 10 % in water (D10W) infusion (has no administration in time range)   dextrose 50 % injection 50 mL (has no administration in time range)   dextrose 10 % in water (D10W) infusion (has no administration in time range)   dextrose 5%-0.45 % sodium chloride infusion (has no administration in time range)   heparin (porcine) injection 4,000  Units (has no administration in time range)   heparin 25,000 Units in dextrose 5% 250 mL (100 Units/mL) infusion (premix) (has no administration in time range)   heparin (porcine) injection 3,000-4,000 Units (has no administration in time range)   sodium chloride 0.45 % infusion (has no administration in time range)   famotidine PF (Pepcid) injection 20 mg (20 mg intravenous Given 8/23/24 1425)   ondansetron (Zofran) injection 4 mg (4 mg intravenous Given 8/23/24 1425)   sodium chloride 0.9 % bolus 1,000 mL (0 mL intravenous Stopped 8/23/24 1609)   sodium chloride 0.9 % bolus 3,870 mL (0 mL intravenous Stopped 8/23/24 1609)   piperacillin-tazobactam (Zosyn) 4.5 g in dextrose (iso)  mL (0 g intravenous Stopped 8/23/24 1609)   iohexol (OMNIPaque) 350 mg iodine/mL solution 75 mL (75 mL intravenous Given 8/23/24 1550)   promethazine (Phenergan) 12.5 mg in sodium chloride 0.9% 50 mL IV (12.5 mg intravenous Given 8/23/24 1627)   insulin regular (HumuLIN R,NovoLIN R) injection 10 Units (10 Units intravenous Given 8/23/24 1724)     New Prescriptions    No medications on file     I spoke with Dr. Mejia. We thoroughly discussed the history, physical exam, laboratory and imaging studies, as well as, emergency department course. Based upon that discussion, we've decided to admit for further observation and evaluation of their  sepsis, NSTEMI.  As I have deemed necessary from their history, physical, and studies, I have considered and evaluated for the following diagnoses: ACUTE CORONARY SYNDROME, PERICARDIAL TAMPONADE, PNEUMOTHORAX, P ULMONARY EMBOLISM, and THORACIC DISSECTION.     Patient initial troponin 297, repeat 282.  EKG shows no acute STEMI.  Patient also hyperglycemic around 400, normal pH, gap greater than 18, low bicarb.  Also lactic acidosis with lactic acid 3.4 leukocytosis 21.4.  Normal CT scan of the abdomen and pelvis, negative chest x-ray.  Patient was started on broad-spectrum antibiotics after blood  cultures were drawn.  Started on insulin drip as well.  Was admitted to the ICU for further evaluation and care.  Dr. Gould and cardiology was consulted as well recommended low-dose heparin.  CT angio of the chest pending at time of admission    After reviewing patient's comorbidities, severity of history of presenting illness, labs and imaging if obtained in conjunction with physical exam and course in emergency department, deemed to have potential for deterioration/progression of symptoms that could lead to multiple morbidities or mortality, decision made that patient requires further observation/evaluation/treatment and patient admitted to appropriate service, patient/family understand and agree with plan.    I saw this patient in conjunction with Dr. Tellez, please see her supervision note.            Critical Care: CRITICAL CARE NOTE     The patient was reevaluated/re-examined multiple times during the visit. Critical care time includes management at bedside, discussion with other providers and consultants, family counseling and answering questions, and documentation. Care involves decision making of high complexity to assess, manipulate, and support vital organ system failure and/or to prevent further life threatening deterioration of the patient's condition. Failure to initiate these interventions on an urgent basis would likely result in sudden, clinically significant or life threatening deterioration in the patient's condition of (###)      Critical Care Time: 31 minutes excluding time spent performing procedures, treating other patients, and teaching time.    Critical Concern/Vital Organ System Affected: heart    Critical Intervention: Heparin, IV fluids, IV antibiotics                  This chart was completed using voice recognition transcription software. Please excuse any errors of transcription including grammatical, punctuation, syntax and spelling errors.  Please contact me with any questions  regarding this chart.    Procedure  Procedures     Oli Cleveland, BRENTON-CNP  08/23/24 8416

## 2024-08-23 NOTE — PROGRESS NOTES
"Attestation/Supervisory note for ERYN Megha      The patient is a 43-year-old female presenting to the emergency department for evaluation of nausea, vomiting and diarrhea with generalized malaise.  She states that her symptoms started 2 to 3 days ago.  She states that anytime she takes a sip of water she feels nauseated.  She states that she does not really have any abdominal pain other than sometimes she has cramping when she is vomiting or has diarrhea.  She denies any headache or visual changes.  No chest pain or shortness of breath.  No fever but she does have \"the chills\".  She denies any back pain.  No urinary complaints.  No vaginal discharge.  She states that she has been having multiple episodes of nonbloody emesis and a few loose stools each day.  She denies any sick contacts or recent travel.  No recent antibiotic use.  She states that she is a diabetic and is supposed to be taking metformin but she had a change in her insurance and could not follow-up or get medications and has been off of the medications for several months.  She denies any other symptoms at this time.  All pertinent positives and negatives are recorded above.  All other systems reviewed and otherwise negative.  Vital signs with tachycardia and hypertension but otherwise within normal limits.  Physical exam with a well-nourished well-developed female with obesity but no evidence of acute distress.  HEENT exam within normal limits.  She has no evidence of airway compromise or respiratory distress.  Abdominal exam is benign.  She does not have any gross motor, neurologic or vascular deficits on exam.  Pulses are equal bilaterally.      EKG with sinus tachycardia at 124 bpm, leftward axis, normal voltage, normal ST segment, and inverted T waves in the lateral leads      IV Pepcid, IV Zofran and IV fluids ordered.      Diagnostic labs with elevated troponin T, hyperglycemia, leukocytosis, lactic acidosis, glycosuria, ketonuria, " hyperglycemia, elevated beta hydroxybutyrate and electrolyte imbalance but otherwise unremarkable.      Initial trop T 297.  Repeat trop T 282      Initial lactic 3.4.  repeat lactic       CT angio chest for pulmonary embolism   Final Result   1. No acute pulmonary embolism to the segmental arterial level.   2. Bilateral ground-glass opacities with slight mosaic attenuation of   the lung parenchyma which may relate to reactive airways disease. No   consolidation.   3. Heterogeneous enlarged thyroid gland with coarse calcifications.   Correlate with thyroid function tests and if warranted nonemergent   thyroid ultrasound.   4. Additional findings as described above.             MACRO:   None        Signed by: Wai Bai 8/23/2024 6:42 PM   Dictation workstation:   [id number]      CT abdomen pelvis w IV contrast   Final Result   1.  Hepatic steatosis. No bowel obstruction. No radiopaque   gallstones. No acute inflammatory bowel process detected   2. Normal appendix. No diverticulitis             MACRO:   None        Signed by: Kg Srinivasan 8/23/2024 4:39 PM   Dictation workstation:   [id number]      XR chest 1 view   Final Result   1. No acute cardiopulmonary process.        MACRO:   None.        Signed by: Meseret Villanueva 8/23/2024 2:42 PM   Dictation workstation:   [id number]           The patient does not have evidence of STEMI on EKG but does have inverted T waves in the lateral leads.  The patient does have an elevated troponin T but it is downgoing.  No events on telemetry other than sinus tachycardia.  The patient continues to deny any chest pain or shortness of breath but she was persistently tachycardic in the emergency department CT chest was ordered.  It did not show any evidence of PE or dissection.  The chest x-ray did not show any evidence of acute process.  No evidence of pneumonia or pneumothorax.  No evidence of CHF.  No widening of the mediastinum.  She does have equal pulses  bilaterally.  CT abdomen pelvis showed no evidence of acute process.  No evidence of appendicitis, diverticulitis, cholecystitis, pancreatitis or small bowel obstruction.  She does have some hepatic steatosis.  The patient does have evidence of an anion gap on chemistry panel but her pH is 7.42.  She does have evidence of an elevated beta hydroxybutyrate and leukocytosis with electrolyte imbalance as well.  She was given IV insulin.  She did trigger the sepsis bundle with a leukocytosis, lactic acidosis, tachycardia.  Cultures were obtained and IV fluids and IV antibiotics were ordered.  I did speak to cardiology, Dr. Rice, regarding the elevated troponin T and EKG findings.  He does recommend treatment with IV heparin and admission to the hospitalist with plan to get echocardiogram.  I spoke with Dr. Herndon, hospitalist, and he recommended admission to the ICU team.  I did discuss the case with the ICU team including Dr. Mejia.  He did not feel that the patient required ICU admission but we will speak to Dr. Herndon and they will make a plan for the admission status and level of care for this patient.      Impression/diagnosis:  Malaise and fatigue  Nausea and vomiting  Diarrhea  Hypertension, unspecified  SIRS  Diabetic hyperglycemia  Non-STEMI      Critical care time of  39 minutes billed for management of possible DKA with initiation of IV fluids and IV insulin, initiation of the sepsis bundle, monitoring the patient's telemetry, consultation with cardiology, consultation with the ICU team, consultation with hospitalist, consultation with the patient regarding her results.  This time excludes time for billable procedures.      critical care time billed for by me is non concurrent with time billed for by ERYN [place]      I personally saw the patient and made/approve the management plan and take responsibility for the patient management.      I independently interpreted the following study (S) EKG and  diagnostic labs      I personally discussed the patient's management with the patient, the hospitalist, the ICU intensivist, and cardiology      I reviewed the results of the diagnostic labs and diagnostic imaging.  Formal radiology read was completed by the radiologist.      Kayleigh Camargo MD

## 2024-08-24 ENCOUNTER — APPOINTMENT (OUTPATIENT)
Dept: CARDIOLOGY | Facility: HOSPITAL | Age: 44
DRG: 280 | End: 2024-08-24
Payer: COMMERCIAL

## 2024-08-24 LAB
ALBUMIN SERPL-MCNC: 3 G/DL (ref 3.5–5)
ALBUMIN SERPL-MCNC: 3.1 G/DL (ref 3.5–5)
ALBUMIN SERPL-MCNC: 3.1 G/DL (ref 3.5–5)
ANION GAP SERPL CALC-SCNC: 10 MMOL/L
ANION GAP SERPL CALC-SCNC: 9 MMOL/L
ANION GAP SERPL CALC-SCNC: 9 MMOL/L
AORTIC VALVE PEAK VELOCITY: 1.01 M/S
AV PEAK GRADIENT: 4.1 MMHG
AVA (PEAK VEL): 2.73 CM2
BASOPHILS # BLD AUTO: 0.08 X10*3/UL (ref 0–0.1)
BASOPHILS NFR BLD AUTO: 0.5 %
BUN SERPL-MCNC: 17 MG/DL (ref 8–25)
BUN SERPL-MCNC: 20 MG/DL (ref 8–25)
BUN SERPL-MCNC: 20 MG/DL (ref 8–25)
CALCIUM SERPL-MCNC: 8.1 MG/DL (ref 8.5–10.4)
CALCIUM SERPL-MCNC: 8.4 MG/DL (ref 8.5–10.4)
CALCIUM SERPL-MCNC: 8.4 MG/DL (ref 8.5–10.4)
CHLORIDE SERPL-SCNC: 101 MMOL/L (ref 97–107)
CHLORIDE SERPL-SCNC: 103 MMOL/L (ref 97–107)
CHLORIDE SERPL-SCNC: 105 MMOL/L (ref 97–107)
CO2 SERPL-SCNC: 19 MMOL/L (ref 24–31)
CO2 SERPL-SCNC: 20 MMOL/L (ref 24–31)
CO2 SERPL-SCNC: 21 MMOL/L (ref 24–31)
CREAT SERPL-MCNC: 1.1 MG/DL (ref 0.4–1.6)
EGFRCR SERPLBLD CKD-EPI 2021: 64 ML/MIN/1.73M*2
EJECTION FRACTION APICAL 4 CHAMBER: 29.7
EJECTION FRACTION: 33 %
EOSINOPHIL # BLD AUTO: 0.02 X10*3/UL (ref 0–0.7)
EOSINOPHIL NFR BLD AUTO: 0.1 %
ERYTHROCYTE [DISTWIDTH] IN BLOOD BY AUTOMATED COUNT: 15.1 % (ref 11.5–14.5)
GLOBAL LONGITUDINAL STRAIN: -6 %
GLUCOSE BLD MANUAL STRIP-MCNC: 150 MG/DL (ref 74–99)
GLUCOSE BLD MANUAL STRIP-MCNC: 154 MG/DL (ref 74–99)
GLUCOSE BLD MANUAL STRIP-MCNC: 161 MG/DL (ref 74–99)
GLUCOSE BLD MANUAL STRIP-MCNC: 169 MG/DL (ref 74–99)
GLUCOSE BLD MANUAL STRIP-MCNC: 170 MG/DL (ref 74–99)
GLUCOSE BLD MANUAL STRIP-MCNC: 177 MG/DL (ref 74–99)
GLUCOSE BLD MANUAL STRIP-MCNC: 179 MG/DL (ref 74–99)
GLUCOSE BLD MANUAL STRIP-MCNC: 186 MG/DL (ref 74–99)
GLUCOSE BLD MANUAL STRIP-MCNC: 187 MG/DL (ref 74–99)
GLUCOSE BLD MANUAL STRIP-MCNC: 187 MG/DL (ref 74–99)
GLUCOSE BLD MANUAL STRIP-MCNC: 198 MG/DL (ref 74–99)
GLUCOSE BLD MANUAL STRIP-MCNC: 209 MG/DL (ref 74–99)
GLUCOSE SERPL-MCNC: 151 MG/DL (ref 65–99)
GLUCOSE SERPL-MCNC: 173 MG/DL (ref 65–99)
GLUCOSE SERPL-MCNC: 200 MG/DL (ref 65–99)
HCT VFR BLD AUTO: 36.2 % (ref 36–46)
HGB BLD-MCNC: 11.5 G/DL (ref 12–16)
IMM GRANULOCYTES # BLD AUTO: 0.09 X10*3/UL (ref 0–0.7)
IMM GRANULOCYTES NFR BLD AUTO: 0.6 % (ref 0–0.9)
LACTATE BLDV-SCNC: 1.5 MMOL/L (ref 0.4–2)
LACTATE BLDV-SCNC: 2.2 MMOL/L (ref 0.4–2)
LEFT ATRIUM VOLUME AREA LENGTH INDEX BSA: 28.2 ML/M2
LEFT VENTRICLE INTERNAL DIMENSION DIASTOLE: 5.19 CM (ref 3.5–6)
LEFT VENTRICULAR OUTFLOW TRACT DIAMETER: 2 CM
LV EJECTION FRACTION BIPLANE: 34 %
LYMPHOCYTES # BLD AUTO: 2.61 X10*3/UL (ref 1.2–4.8)
LYMPHOCYTES NFR BLD AUTO: 16.6 %
MAGNESIUM SERPL-MCNC: 2 MG/DL (ref 1.6–3.1)
MAGNESIUM SERPL-MCNC: 2.1 MG/DL (ref 1.6–3.1)
MAGNESIUM SERPL-MCNC: 2.3 MG/DL (ref 1.6–3.1)
MCH RBC QN AUTO: 26.6 PG (ref 26–34)
MCHC RBC AUTO-ENTMCNC: 31.8 G/DL (ref 32–36)
MCV RBC AUTO: 84 FL (ref 80–100)
MITRAL VALVE E/A RATIO: 0.62
MONOCYTES # BLD AUTO: 1.55 X10*3/UL (ref 0.1–1)
MONOCYTES NFR BLD AUTO: 9.9 %
NEUTROPHILS # BLD AUTO: 11.36 X10*3/UL (ref 1.2–7.7)
NEUTROPHILS NFR BLD AUTO: 72.3 %
NRBC BLD-RTO: 0 /100 WBCS (ref 0–0)
PHOSPHATE SERPL-MCNC: 2.1 MG/DL (ref 2.5–4.5)
PHOSPHATE SERPL-MCNC: 2.1 MG/DL (ref 2.5–4.5)
PHOSPHATE SERPL-MCNC: 2.7 MG/DL (ref 2.5–4.5)
PLATELET # BLD AUTO: 381 X10*3/UL (ref 150–450)
POTASSIUM SERPL-SCNC: 3.2 MMOL/L (ref 3.4–5.1)
POTASSIUM SERPL-SCNC: 3.5 MMOL/L (ref 3.4–5.1)
POTASSIUM SERPL-SCNC: 3.7 MMOL/L (ref 3.4–5.1)
RBC # BLD AUTO: 4.33 X10*6/UL (ref 4–5.2)
RIGHT VENTRICLE FREE WALL PEAK S': 10.8 CM/S
SODIUM SERPL-SCNC: 130 MMOL/L (ref 133–145)
SODIUM SERPL-SCNC: 133 MMOL/L (ref 133–145)
SODIUM SERPL-SCNC: 134 MMOL/L (ref 133–145)
TRICUSPID ANNULAR PLANE SYSTOLIC EXCURSION: 1.6 CM
WBC # BLD AUTO: 15.7 X10*3/UL (ref 4.4–11.3)

## 2024-08-24 PROCEDURE — 2500000004 HC RX 250 GENERAL PHARMACY W/ HCPCS (ALT 636 FOR OP/ED)

## 2024-08-24 PROCEDURE — 80069 RENAL FUNCTION PANEL: CPT

## 2024-08-24 PROCEDURE — 36415 COLL VENOUS BLD VENIPUNCTURE: CPT

## 2024-08-24 PROCEDURE — 82947 ASSAY GLUCOSE BLOOD QUANT: CPT

## 2024-08-24 PROCEDURE — 2500000001 HC RX 250 WO HCPCS SELF ADMINISTERED DRUGS (ALT 637 FOR MEDICARE OP): Performed by: INTERNAL MEDICINE

## 2024-08-24 PROCEDURE — 2500000002 HC RX 250 W HCPCS SELF ADMINISTERED DRUGS (ALT 637 FOR MEDICARE OP, ALT 636 FOR OP/ED)

## 2024-08-24 PROCEDURE — 93306 TTE W/DOPPLER COMPLETE: CPT | Performed by: INTERNAL MEDICINE

## 2024-08-24 PROCEDURE — 83735 ASSAY OF MAGNESIUM: CPT

## 2024-08-24 PROCEDURE — 93356 MYOCRD STRAIN IMG SPCKL TRCK: CPT

## 2024-08-24 PROCEDURE — 99291 CRITICAL CARE FIRST HOUR: CPT | Performed by: STUDENT IN AN ORGANIZED HEALTH CARE EDUCATION/TRAINING PROGRAM

## 2024-08-24 PROCEDURE — 2500000001 HC RX 250 WO HCPCS SELF ADMINISTERED DRUGS (ALT 637 FOR MEDICARE OP)

## 2024-08-24 PROCEDURE — 93356 MYOCRD STRAIN IMG SPCKL TRCK: CPT | Performed by: INTERNAL MEDICINE

## 2024-08-24 PROCEDURE — 2060000001 HC INTERMEDIATE ICU ROOM DAILY

## 2024-08-24 PROCEDURE — 85025 COMPLETE CBC W/AUTO DIFF WBC: CPT

## 2024-08-24 PROCEDURE — 83605 ASSAY OF LACTIC ACID: CPT

## 2024-08-24 PROCEDURE — 99222 1ST HOSP IP/OBS MODERATE 55: CPT | Performed by: NURSE PRACTITIONER

## 2024-08-24 RX ORDER — INSULIN GLARGINE 100 [IU]/ML
45 INJECTION, SOLUTION SUBCUTANEOUS ONCE
Status: COMPLETED | OUTPATIENT
Start: 2024-08-24 | End: 2024-08-24

## 2024-08-24 RX ORDER — LISINOPRIL 40 MG/1
40 TABLET ORAL DAILY
Status: DISCONTINUED | OUTPATIENT
Start: 2024-08-24 | End: 2024-08-24

## 2024-08-24 RX ORDER — ENOXAPARIN SODIUM 100 MG/ML
40 INJECTION SUBCUTANEOUS EVERY 12 HOURS SCHEDULED
Status: DISCONTINUED | OUTPATIENT
Start: 2024-08-24 | End: 2024-08-26 | Stop reason: HOSPADM

## 2024-08-24 RX ORDER — INSULIN LISPRO 100 [IU]/ML
0-15 INJECTION, SOLUTION INTRAVENOUS; SUBCUTANEOUS
Status: DISCONTINUED | OUTPATIENT
Start: 2024-08-24 | End: 2024-08-26 | Stop reason: HOSPADM

## 2024-08-24 RX ORDER — LISINOPRIL 20 MG/1
20 TABLET ORAL DAILY
Status: DISCONTINUED | OUTPATIENT
Start: 2024-08-25 | End: 2024-08-25

## 2024-08-24 RX ORDER — CARVEDILOL 3.12 MG/1
3.12 TABLET ORAL 2 TIMES DAILY
Status: DISCONTINUED | OUTPATIENT
Start: 2024-08-24 | End: 2024-08-25

## 2024-08-24 RX ORDER — INSULIN LISPRO 100 [IU]/ML
0-15 INJECTION, SOLUTION INTRAVENOUS; SUBCUTANEOUS
Status: DISCONTINUED | OUTPATIENT
Start: 2024-08-24 | End: 2024-08-24

## 2024-08-24 RX ORDER — POTASSIUM CHLORIDE 20 MEQ/1
40 TABLET, EXTENDED RELEASE ORAL ONCE
Status: COMPLETED | OUTPATIENT
Start: 2024-08-24 | End: 2024-08-24

## 2024-08-24 ASSESSMENT — PAIN - FUNCTIONAL ASSESSMENT
PAIN_FUNCTIONAL_ASSESSMENT: 0-10

## 2024-08-24 ASSESSMENT — PAIN SCALES - GENERAL
PAINLEVEL_OUTOF10: 0 - NO PAIN

## 2024-08-24 NOTE — CONSULTS
Inpatient consult to Cardiology  Consult performed by: FRANKY Hoyt  Consult ordered by: FRANKY Caruso  Reason for consult: Elevated high-sensitivity troponin        History Of Present Illness:    Josefina Huerta is a 43 y.o. female presenting with nausea vomiting, diarrhea and fatigue.  No cardiologist.  Past medical history of hypertensive disorder and diabetes.  Patient states she changed jobs a few months ago and had difficulty obtaining insurance.  In that time she has not taken any of her home medications.  States she developed worsening nausea vomiting and diarrhea as well as generalized weakness and some chills.  Reports no fevers or chest pain, pressure, palpitations or diaphoresis.  EKG in emergency department a sinus tachycardia with T wave inversion in lead II, aVF, V4 through V6 these are new findings compared to previous available EKG.  High-sensitivity troponin values of 282 and 297.  Lactic acid of 2.2.  Glucose over 200.;  Initial blood pressure 186/129.  Patient received IV hydration as well as IV insulin, IV Pepcid, IV magnesium, IV Zofran, IV antibiotics, oral potassium, IV potassium, IV Phenergan.  She was admitted on telemetry to the intensive care unit.  Note she was additionally initiated on a heparin drip for elevated high-sensitivity troponin.     Last Recorded Vitals:  Vitals:    08/24/24 0400 08/24/24 0500 08/24/24 0600 08/24/24 0700   BP: (!) 144/94 137/88 (!) 153/96    BP Location: Right arm      Patient Position: Lying      Pulse: 102 102 100 (!) 112   Resp: 22 17 18 15   Temp: 37.5 °C (99.5 °F)      TempSrc: Oral      SpO2: 93% 93% 91% 91%   Weight:   140 kg (308 lb 13.8 oz)    Height:           Last Labs:  CBC - 8/24/2024:  4:11 AM  15.7 11.5 381    36.2      CMP - 8/24/2024:  4:11 AM  8.1 7.9 29 --- 0.6   2.7 3.0 21 103      PTT - 8/23/2024:  6:34 PM  _   _ 25.7     Hemoglobin A1C   Date/Time Value Ref Range Status   08/12/2022 08:53 AM 6.7 (A) % Final      Comment:          Diagnosis of Diabetes-Adults   Non-Diabetic: < or = 5.6%   Increased risk for developing diabetes: 5.7-6.4%   Diagnostic of diabetes: > or = 6.5%  .       Monitoring of Diabetes                Age (y)     Therapeutic Goal (%)   Adults:          >18           <7.0   Pediatrics:    13-18           <7.5                   7-12           <8.0                   0- 6            7.5-8.5   American Diabetes Association. Diabetes Care 33(S1), Jan 2010.     05/13/2022 08:30 AM 7.7 (A) % Final     Comment:          Diagnosis of Diabetes-Adults   Non-Diabetic: < or = 5.6%   Increased risk for developing diabetes: 5.7-6.4%   Diagnostic of diabetes: > or = 6.5%  .       Monitoring of Diabetes                Age (y)     Therapeutic Goal (%)   Adults:          >18           <7.0   Pediatrics:    13-18           <7.5                   7-12           <8.0                   0- 6            7.5-8.5   American Diabetes Association. Diabetes Care 33(S1), Jan 2010.       VLDL   Date/Time Value Ref Range Status   02/11/2022 08:07 AM 56 (H) 0 - 40 mg/dL Final      Results for orders placed or performed during the hospital encounter of 08/23/24 (from the past 24 hour(s))   Urinalysis with Reflex Microscopic   Result Value Ref Range    Color, Urine Light-Yellow Light-Yellow, Yellow, Dark-Yellow    Appearance, Urine Clear Clear    Specific Gravity, Urine 1.038 (N) 1.005 - 1.035    pH, Urine 6.0 5.0, 5.5, 6.0, 6.5, 7.0, 7.5, 8.0    Protein, Urine 600 (3+) (A) NEGATIVE, 10 (TRACE), 20 (TRACE) mg/dL    Glucose, Urine OVER (4+) (A) Normal mg/dL    Blood, Urine 0.2 (2+) (A) NEGATIVE    Ketones, Urine 60 (2+) (A) NEGATIVE mg/dL    Bilirubin, Urine NEGATIVE NEGATIVE    Urobilinogen, Urine Normal Normal mg/dL    Nitrite, Urine NEGATIVE NEGATIVE    Leukocyte Esterase, Urine NEGATIVE NEGATIVE   Comprehensive Metabolic Panel   Result Value Ref Range    Glucose 426 (H) 65 - 99 mg/dL    Sodium 135 133 - 145 mmol/L    Potassium 3.8 3.4  - 5.1 mmol/L    Chloride 98 97 - 107 mmol/L    Bicarbonate 17 (L) 24 - 31 mmol/L    Urea Nitrogen 25 8 - 25 mg/dL    Creatinine 1.20 0.40 - 1.60 mg/dL    eGFR 57 (L) >60 mL/min/1.73m*2    Calcium 10.0 8.5 - 10.4 mg/dL    Albumin 3.9 3.5 - 5.0 g/dL    Alkaline Phosphatase 103 35 - 125 U/L    Total Protein 7.9 5.9 - 7.9 g/dL    AST 29 5 - 40 U/L    Bilirubin, Total 0.6 0.1 - 1.2 mg/dL    ALT 21 5 - 40 U/L    Anion Gap >19 (H) <=19 mmol/L   Lipase   Result Value Ref Range    Lipase 31 16 - 63 U/L   hCG, Urine, Qualitative   Result Value Ref Range    HCG, Urine NEGATIVE NEGATIVE   CBC and Auto Differential   Result Value Ref Range    WBC 21.4 (H) 4.4 - 11.3 x10*3/uL    nRBC 0.0 0.0 - 0.0 /100 WBCs    RBC 5.40 (H) 4.00 - 5.20 x10*6/uL    Hemoglobin 14.3 12.0 - 16.0 g/dL    Hematocrit 44.4 36.0 - 46.0 %    MCV 82 80 - 100 fL    MCH 26.5 26.0 - 34.0 pg    MCHC 32.2 32.0 - 36.0 g/dL    RDW 15.2 (H) 11.5 - 14.5 %    Platelets 503 (H) 150 - 450 x10*3/uL    Neutrophils % 87.2 40.0 - 80.0 %    Immature Granulocytes %, Automated 0.5 0.0 - 0.9 %    Lymphocytes % 6.2 13.0 - 44.0 %    Monocytes % 5.3 2.0 - 10.0 %    Eosinophils % 0.3 0.0 - 6.0 %    Basophils % 0.5 0.0 - 2.0 %    Neutrophils Absolute 18.65 (H) 1.20 - 7.70 x10*3/uL    Immature Granulocytes Absolute, Automated 0.11 0.00 - 0.70 x10*3/uL    Lymphocytes Absolute 1.32 1.20 - 4.80 x10*3/uL    Monocytes Absolute 1.14 (H) 0.10 - 1.00 x10*3/uL    Eosinophils Absolute 0.07 0.00 - 0.70 x10*3/uL    Basophils Absolute 0.11 (H) 0.00 - 0.10 x10*3/uL   BLOOD GAS LACTIC ACID, VENOUS   Result Value Ref Range    POCT Lactate, Venous 3.4 (H) 0.4 - 2.0 mmol/L   Serial Troponin, Initial ([facility])   Result Value Ref Range    Troponin T, High Sensitivity 297 (HH) <=14 ng/L   Microscopic Only, Urine   Result Value Ref Range    WBC, Urine 11-20 (A) 1-5, NONE /HPF    RBC, Urine 6-10 (A) NONE, 1-2, 3-5 /HPF    Squamous Epithelial Cells, Urine 1-9 (SPARSE) Reference range not established. /HPF     Bacteria, Urine 1+ (A) NONE SEEN /HPF    Mucus, Urine FEW Reference range not established. /LPF    Hyaline Casts, Urine 3+ (A) NONE /LPF   SARS-CoV-2 RT PCR   Result Value Ref Range    Coronavirus 2019, PCR Not Detected Not Detected   PST Top   Result Value Ref Range    Extra Tube Hold for add-ons.    Serial Troponin, 6 Hour ([facility])   Result Value Ref Range    Troponin T, High Sensitivity 282 (HH) <=14 ng/L   Blood Culture    Specimen: Peripheral Venipuncture; Blood culture   Result Value Ref Range    Blood Culture Loaded on Instrument - Culture in progress    Blood Culture    Specimen: Peripheral Venipuncture; Blood culture   Result Value Ref Range    Blood Culture Loaded on Instrument - Culture in progress    Beta Hydroxybutyrate   Result Value Ref Range    Beta-Hydroxybutyrate 3.00 (H) 0.10 - 0.30 mmol/L   BLOOD GAS VENOUS   Result Value Ref Range    POCT pH, Venous 7.42 7.33 - 7.43 pH    POCT pCO2, Venous 27 (L) 41 - 51 mm Hg    POCT pO2, Venous 53 (H) 35 - 45 mm Hg    POCT SO2, Venous 86 (H) 45 - 75 %    POCT Oxy Hemoglobin, Venous 84.1 (H) 45.0 - 75.0 %    POCT Base Excess, Venous -5.5 (L) -2.0 - 3.0 mmol/L    POCT HCO3 Calculated, Venous 17.5 (L) 22.0 - 26.0 mmol/L    Patient Temperature 37.0 degrees Celsius    FiO2 21 %   BLOOD GAS LACTIC ACID, VENOUS   Result Value Ref Range    POCT Lactate, Venous 2.6 (H) 0.4 - 2.0 mmol/L   POCT GLUCOSE   Result Value Ref Range    POCT Glucose 332 (H) 74 - 99 mg/dL   POCT GLUCOSE   Result Value Ref Range    POCT Glucose 296 (H) 74 - 99 mg/dL   Syringe   Result Value Ref Range    Extra Tube Hold for add-ons.    Blood Gas Lactic Acid, Venous   Result Value Ref Range    POCT Lactate, Venous 2.9 (H) 0.4 - 2.0 mmol/L   aPTT   Result Value Ref Range    aPTT 25.7 22.0 - 32.5 seconds   Renal function panel   Result Value Ref Range    Glucose 341 (H) 65 - 99 mg/dL    Sodium 135 133 - 145 mmol/L    Potassium 3.5 3.4 - 5.1 mmol/L    Chloride 101 97 - 107 mmol/L    Bicarbonate 18 (L)  24 - 31 mmol/L    Urea Nitrogen 23 8 - 25 mg/dL    Creatinine 1.10 0.40 - 1.60 mg/dL    eGFR 64 >60 mL/min/1.73m*2    Calcium 8.4 (L) 8.5 - 10.4 mg/dL    Phosphorus 2.1 (L) 2.5 - 4.5 mg/dL    Albumin 3.2 (L) 3.5 - 5.0 g/dL    Anion Gap 16 <=19 mmol/L   Magnesium   Result Value Ref Range    Magnesium 1.50 (L) 1.60 - 3.10 mg/dL   TSH with reflex to Free T4 if abnormal   Result Value Ref Range    Thyroid Stimulating Hormone 0.30 0.27 - 4.20 mIU/L   POCT GLUCOSE   Result Value Ref Range    POCT Glucose 303 (H) 74 - 99 mg/dL   POCT GLUCOSE   Result Value Ref Range    POCT Glucose 211 (H) 74 - 99 mg/dL   POCT GLUCOSE   Result Value Ref Range    POCT Glucose 189 (H) 74 - 99 mg/dL   POCT GLUCOSE   Result Value Ref Range    POCT Glucose 145 (H) 74 - 99 mg/dL   Renal function panel   Result Value Ref Range    Glucose 151 (H) 65 - 99 mg/dL    Sodium 134 133 - 145 mmol/L    Potassium 3.5 3.4 - 5.1 mmol/L    Chloride 105 97 - 107 mmol/L    Bicarbonate 20 (L) 24 - 31 mmol/L    Urea Nitrogen 20 8 - 25 mg/dL    Creatinine 1.10 0.40 - 1.60 mg/dL    eGFR 64 >60 mL/min/1.73m*2    Calcium 8.4 (L) 8.5 - 10.4 mg/dL    Phosphorus 2.1 (L) 2.5 - 4.5 mg/dL    Albumin 3.1 (L) 3.5 - 5.0 g/dL    Anion Gap 9 <=19 mmol/L   Magnesium   Result Value Ref Range    Magnesium 2.30 1.60 - 3.10 mg/dL   BLOOD GAS LACTIC ACID, VENOUS   Result Value Ref Range    POCT Lactate, Venous 2.2 (H) 0.4 - 2.0 mmol/L   POCT GLUCOSE   Result Value Ref Range    POCT Glucose 154 (H) 74 - 99 mg/dL   POCT GLUCOSE   Result Value Ref Range    POCT Glucose 150 (H) 74 - 99 mg/dL   POCT GLUCOSE   Result Value Ref Range    POCT Glucose 161 (H) 74 - 99 mg/dL   POCT GLUCOSE   Result Value Ref Range    POCT Glucose 187 (H) 74 - 99 mg/dL   POCT GLUCOSE   Result Value Ref Range    POCT Glucose 209 (H) 74 - 99 mg/dL   Renal function panel   Result Value Ref Range    Glucose 200 (H) 65 - 99 mg/dL    Sodium 133 133 - 145 mmol/L    Potassium 3.7 3.4 - 5.1 mmol/L    Chloride 103 97 - 107  mmol/L    Bicarbonate 21 (L) 24 - 31 mmol/L    Urea Nitrogen 20 8 - 25 mg/dL    Creatinine 1.10 0.40 - 1.60 mg/dL    eGFR 64 >60 mL/min/1.73m*2    Calcium 8.1 (L) 8.5 - 10.4 mg/dL    Phosphorus 2.7 2.5 - 4.5 mg/dL    Albumin 3.0 (L) 3.5 - 5.0 g/dL    Anion Gap 9 <=19 mmol/L   Magnesium   Result Value Ref Range    Magnesium 2.10 1.60 - 3.10 mg/dL   CBC and Auto Differential   Result Value Ref Range    WBC 15.7 (H) 4.4 - 11.3 x10*3/uL    nRBC 0.0 0.0 - 0.0 /100 WBCs    RBC 4.33 4.00 - 5.20 x10*6/uL    Hemoglobin 11.5 (L) 12.0 - 16.0 g/dL    Hematocrit 36.2 36.0 - 46.0 %    MCV 84 80 - 100 fL    MCH 26.6 26.0 - 34.0 pg    MCHC 31.8 (L) 32.0 - 36.0 g/dL    RDW 15.1 (H) 11.5 - 14.5 %    Platelets 381 150 - 450 x10*3/uL    Neutrophils % 72.3 40.0 - 80.0 %    Immature Granulocytes %, Automated 0.6 0.0 - 0.9 %    Lymphocytes % 16.6 13.0 - 44.0 %    Monocytes % 9.9 2.0 - 10.0 %    Eosinophils % 0.1 0.0 - 6.0 %    Basophils % 0.5 0.0 - 2.0 %    Neutrophils Absolute 11.36 (H) 1.20 - 7.70 x10*3/uL    Immature Granulocytes Absolute, Automated 0.09 0.00 - 0.70 x10*3/uL    Lymphocytes Absolute 2.61 1.20 - 4.80 x10*3/uL    Monocytes Absolute 1.55 (H) 0.10 - 1.00 x10*3/uL    Eosinophils Absolute 0.02 0.00 - 0.70 x10*3/uL    Basophils Absolute 0.08 0.00 - 0.10 x10*3/uL   BLOOD GAS LACTIC ACID, VENOUS   Result Value Ref Range    POCT Lactate, Venous 1.5 0.4 - 2.0 mmol/L   POCT GLUCOSE   Result Value Ref Range    POCT Glucose 198 (H) 74 - 99 mg/dL   POCT GLUCOSE   Result Value Ref Range    POCT Glucose 186 (H) 74 - 99 mg/dL       Last I/O:  I/O last 3 completed shifts:  In: 4313.6 (30.8 mL/kg) [I.V.:2058.6 (14.7 mL/kg); IV Piggyback:2255]  Out: - (0 mL/kg)   Weight: 140.1 kg     Past Cardiology Tests (Last 3 Years):  EKG: Sinus tachycardia with T wave inversion in lead II, aVF, and V4 through V6    Past Medical History:  Diabetes mellitus type 2  Obesity  Hypertensive disorder    Past Surgical History:  She has no past surgical history  on file.      Social History:  Denies tobacco use  Denies alcohol use  Denies drugs of abuse  Independent with ADLs      Family History:  Father: , age 59, myocardial infarction  Mother: Alive, no significant cardiac disease     Allergies:  Patient has no known allergies.    Inpatient Medications:  Scheduled medications   Medication Dose Route Frequency    atorvastatin  40 mg oral Nightly    heparin (porcine)  4,000 Units intravenous Once    insulin glargine  45 Units subcutaneous Once    insulin lispro  0-15 Units subcutaneous TID    lisinopril  40 mg oral Daily    metoprolol tartrate  12.5 mg oral BID    perflutren lipid microspheres  0.5-10 mL of dilution intravenous Once in imaging    perflutren protein A microsphere  0.5 mL intravenous Once in imaging    potassium chloride CR  40 mEq oral Once    sulfur hexafluoride microsphr  2 mL intravenous Once in imaging     PRN medications   Medication    acetaminophen    Or    acetaminophen    benzocaine-menthol    dextrose    labetaloL    ondansetron    Or    ondansetron     Continuous Medications   Medication Dose Last Rate    dextrose 10 % in water (D10W)  150 mL/hr 150 mL/hr (24 0700)    insulin regular  0-30 Units/hr 4.8 Units/hr (24 0700)     Outpatient Medications:  Current Outpatient Medications   Medication Instructions    atorvastatin (LIPITOR) 40 mg, oral, Daily    lisinopril 40 mg, oral, Daily    metFORMIN (GLUCOPHAGE) 1,000 mg, oral, 2 times daily    metoprolol succinate XL (TOPROL-XL) 12.5 mg, oral, Daily       Physical Exam:  General: alert, oriented x 3, very pleasant  HEENT: normal cephalic, atraumatic, no scleral icterus,  Neck: No JVD, bruit or thrill, masses or tenderness   Heart: S1/S2, Rate 98, Rhythm regular, no s3 or s4, no murmur, thrill, or heaves at PMI.   Lungs: Clear, equal expansion and excursion, no wheezes, crackles, rhales or rhonci room air.  No conversational dyspnea appreciated.  No tachypnea.  No pain with deep  serration.   Abdomen: Obese, bowel sounds x 4, soft, non-tender  Genitourinary: deferred   Extremities: No significant upper or lower extremity edema appreciated       Assessment/Plan     Nausea, vomiting, diarrhea  Dehydration  DKA  Lactic acidosis  Elevated high-sensitivity troponin  Hypertensive urgency  Sinus tachycardia  Obesity    Overall impression:      8/24: As above, presents with symptoms of nausea, vomiting, diarrhea, and fatigue over 2 to 3 days.  Found to be in early DKA and significant dehydrated.  received initial treatment for this.  Was noted to have a mildly elevated lactic level of 2.2.  She was tachycardic with rates in the 140s.  Additionally was noted to be in a hypertensive urgency with systolic initially above 180.  She did have an incidental finding of elevated high-sensitivity troponin values of 282 and 297.  Although these levels are significantly elevated, they are flat, not generally consistent with evolving acute coronary syndrome.  She does have nonspecific T wave inversions which are new compared to previous EKG in leads II, aVF, and V4 through V6.  No significant findings on abdominal CT or CT chest angio.  It is most likely elevated high-sensitivity troponin is reflective of her significant dehydration, tachycardia, hypertensive urgency and mild lactic acidosis.  Despite these findings she has not had any complaints of chest pain or pressure, palpitations, feeling rapid heart rate, or other anginal equivalents.  She is breathing comfortably on room air and appears euvolemic.  At this point of living echocardiogram will be the next best tool to help us decide if patient is undergoing any significant coronary event.  Will assess for new wall motion abnormalities, or reduction in ejection fraction.  If there are no significant findings on echocardiogram believe the patient is stable from a cardiac perspective and would classify this as a type II myocardial infarction.  If the  echocardiogram returns without significant findings we will stop the heparin drip; however, if there are wall motion abnormalities starting to reduce ejection fraction the patient will need to proceed with a cardiac catheterization most likely on Monday.  Will follow with you.      Code Status:  Full Code    I spent 60 minutes in the professional and overall care of this patient.        Nj Lara, APRN-CNP

## 2024-08-24 NOTE — CARE PLAN
The patient's goals for the shift include      The clinical goals for the shift include pt will have decreased blood sugars

## 2024-08-24 NOTE — CARE PLAN
The patient's goals for the shift include      The clinical goals for the shift include pt will have decreased blood sugars    Over the shift, the patient did not make progress toward the following goals. Barriers to progression include . Recommendations to address these barriers include .

## 2024-08-24 NOTE — PROGRESS NOTES
I have seen the patient either independently or with an associated resident physician or advanced practice provider.    Interval Events: Overnight patient's anion gap was closed, transitioned to subcutaneous insulin this morning. Nausea, vomiting resolved. No report of chest pain or shortness of breath this morning.    Physical Exam  Constitutional:       General: She is not in acute distress.  Eyes:      Pupils: Pupils are equal, round, and reactive to light.   Cardiovascular:      Rate and Rhythm: Regular rhythm. Tachycardia present.   Pulmonary:      Effort: No respiratory distress.   Abdominal:      Palpations: Abdomen is soft.      Tenderness: There is no abdominal tenderness. There is no guarding.   Musculoskeletal:         General: Normal range of motion.   Skin:     General: Skin is warm and dry.   Neurological:      Mental Status: She is alert and oriented to person, place, and time.         Neuro: No active issues. Tylenol prn for mild pain. Zofran as needed for nausea. Delirium precautions in place.  Cardiac: Troponin elevation, Demand ischemia vs NSTEMI. Hx of HTN, HLD. Labetalol prn for SBP >180. Resumed home metoprolol, lisinopril. TTE pending.  Pulmonary: No acute issues. Maintain oxygen saturations greater than 92%.  Gastrointestinal: Gastroenteritis. Advancing to carb control diet. Bowel regimen held at this time.   Renal: Lactic acidosis now resolved. Mild hyponatremia. Hypophosphatemia 2.1 - repleting. Continue ph  Endocrine: DKA resolved. Received 45 lantus this AM, ISS with meals. Home anti-hyperglycemic medication held. Enlarged thyroid observed on CT imaging, TSH wnl. May require further diagnostic work-up in the outpatient setting via US.  Hematology: DVT prophylaxis with lovenox.  Infectious Disease: Suspect viral gastroenteritis. No antibiotics at this time.  Musculoskeletal: No acute issues.    Lines/Tubes/Drains: PIV      Prophylaxis: Lovenox  Med to Discontinue: None    Disposition:  Stable for transfer from ICU    ABCDEF Checklist  Analgesia: Spontaneous awakening trial to be pursued if clinically appropriate. RASS goal reviewed if applicable.  Breathing: Spontaneous breathing trial to be pursued if clinically appropriate. Mechanical power of assisted ventilation reviewed if applicable.  Choice of analgesia/sedation: Analgesic and sedative agents adjusted per clinical context.   Delirium assessed by CAM, will avoid exacerbating factors   Early mobility and exercise: Physical and occupational therapy engaged   Family: Plan of care, overall trajectory of patient shared with family. Questions elicited and answered as appropriate.       Due to the high probability of life threatening clinical decompensation, the patient required critical care time evaluating and managing this patient.  Critical care time included obtaining a history, examining the patient, ordering and reviewing studies, discussing, developing, and implementing a management plan, evaluating the patient's response to treatment, and discussion with other care team providers. I saw and evaluated the patient myself.  Critical care time was performed exclusive of billable procedures.    Critical care time: 45 minutes

## 2024-08-25 VITALS
TEMPERATURE: 97 F | BODY MASS INDEX: 44.41 KG/M2 | DIASTOLIC BLOOD PRESSURE: 89 MMHG | WEIGHT: 293 LBS | SYSTOLIC BLOOD PRESSURE: 137 MMHG | HEIGHT: 68 IN | OXYGEN SATURATION: 97 % | RESPIRATION RATE: 20 BRPM | HEART RATE: 101 BPM

## 2024-08-25 PROBLEM — I21.4 NON-STEMI (NON-ST ELEVATED MYOCARDIAL INFARCTION) (MULTI): Status: ACTIVE | Noted: 2024-08-23

## 2024-08-25 PROBLEM — I50.21 ACUTE SYSTOLIC HEART FAILURE (MULTI): Status: ACTIVE | Noted: 2024-08-23

## 2024-08-25 LAB
ALBUMIN SERPL-MCNC: 3 G/DL (ref 3.5–5)
ALP BLD-CCNC: 73 U/L (ref 35–125)
ALT SERPL-CCNC: 20 U/L (ref 5–40)
ANION GAP SERPL CALC-SCNC: 12 MMOL/L
APPEARANCE UR: CLEAR
AST SERPL-CCNC: 21 U/L (ref 5–40)
BACTERIA BLD CULT: NORMAL
BACTERIA BLD CULT: NORMAL
BASOPHILS # BLD AUTO: 0.09 X10*3/UL (ref 0–0.1)
BASOPHILS NFR BLD AUTO: 0.9 %
BILIRUB SERPL-MCNC: 0.3 MG/DL (ref 0.1–1.2)
BILIRUB UR STRIP.AUTO-MCNC: NEGATIVE MG/DL
BUN SERPL-MCNC: 16 MG/DL (ref 8–25)
CALCIUM SERPL-MCNC: 8.2 MG/DL (ref 8.5–10.4)
CHLORIDE SERPL-SCNC: 105 MMOL/L (ref 97–107)
CO2 SERPL-SCNC: 20 MMOL/L (ref 24–31)
COLOR UR: ABNORMAL
CREAT SERPL-MCNC: 1.1 MG/DL (ref 0.4–1.6)
EGFRCR SERPLBLD CKD-EPI 2021: 64 ML/MIN/1.73M*2
EOSINOPHIL # BLD AUTO: 0.26 X10*3/UL (ref 0–0.7)
EOSINOPHIL NFR BLD AUTO: 2.7 %
ERYTHROCYTE [DISTWIDTH] IN BLOOD BY AUTOMATED COUNT: 15 % (ref 11.5–14.5)
EST. AVERAGE GLUCOSE BLD GHB EST-MCNC: 232 MG/DL
GLUCOSE BLD MANUAL STRIP-MCNC: 157 MG/DL (ref 74–99)
GLUCOSE BLD MANUAL STRIP-MCNC: 166 MG/DL (ref 74–99)
GLUCOSE BLD MANUAL STRIP-MCNC: 171 MG/DL (ref 74–99)
GLUCOSE BLD MANUAL STRIP-MCNC: 203 MG/DL (ref 74–99)
GLUCOSE SERPL-MCNC: 147 MG/DL (ref 65–99)
GLUCOSE UR STRIP.AUTO-MCNC: ABNORMAL MG/DL
HBA1C MFR BLD: 9.7 %
HCT VFR BLD AUTO: 35.2 % (ref 36–46)
HGB BLD-MCNC: 11.3 G/DL (ref 12–16)
IMM GRANULOCYTES # BLD AUTO: 0.09 X10*3/UL (ref 0–0.7)
IMM GRANULOCYTES NFR BLD AUTO: 0.9 % (ref 0–0.9)
KETONES UR STRIP.AUTO-MCNC: ABNORMAL MG/DL
LEUKOCYTE ESTERASE UR QL STRIP.AUTO: ABNORMAL
LYMPHOCYTES # BLD AUTO: 2.84 X10*3/UL (ref 1.2–4.8)
LYMPHOCYTES NFR BLD AUTO: 29.7 %
MAGNESIUM SERPL-MCNC: 2 MG/DL (ref 1.6–3.1)
MCH RBC QN AUTO: 26.5 PG (ref 26–34)
MCHC RBC AUTO-ENTMCNC: 32.1 G/DL (ref 32–36)
MCV RBC AUTO: 82 FL (ref 80–100)
MONOCYTES # BLD AUTO: 0.9 X10*3/UL (ref 0.1–1)
MONOCYTES NFR BLD AUTO: 9.4 %
MUCOUS THREADS #/AREA URNS AUTO: ABNORMAL /LPF
NEUTROPHILS # BLD AUTO: 5.38 X10*3/UL (ref 1.2–7.7)
NEUTROPHILS NFR BLD AUTO: 56.4 %
NITRITE UR QL STRIP.AUTO: NEGATIVE
NRBC BLD-RTO: 0 /100 WBCS (ref 0–0)
PH UR STRIP.AUTO: 5.5 [PH]
PHOSPHATE SERPL-MCNC: 3.4 MG/DL (ref 2.5–4.5)
PLATELET # BLD AUTO: 340 X10*3/UL (ref 150–450)
POTASSIUM SERPL-SCNC: 3.8 MMOL/L (ref 3.4–5.1)
PROT SERPL-MCNC: 6.2 G/DL (ref 5.9–7.9)
PROT UR STRIP.AUTO-MCNC: ABNORMAL MG/DL
RBC # BLD AUTO: 4.27 X10*6/UL (ref 4–5.2)
RBC # UR STRIP.AUTO: ABNORMAL /UL
RBC #/AREA URNS AUTO: ABNORMAL /HPF
SODIUM SERPL-SCNC: 137 MMOL/L (ref 133–145)
SP GR UR STRIP.AUTO: 1.02
SQUAMOUS #/AREA URNS AUTO: ABNORMAL /HPF
TSH SERPL DL<=0.05 MIU/L-ACNC: 0.41 MIU/L (ref 0.27–4.2)
URATE CRY #/AREA UR COMP ASSIST: ABNORMAL /HPF
UROBILINOGEN UR STRIP.AUTO-MCNC: NORMAL MG/DL
WBC # BLD AUTO: 9.6 X10*3/UL (ref 4.4–11.3)
WBC #/AREA URNS AUTO: ABNORMAL /HPF

## 2024-08-25 PROCEDURE — 83735 ASSAY OF MAGNESIUM: CPT

## 2024-08-25 PROCEDURE — 2500000002 HC RX 250 W HCPCS SELF ADMINISTERED DRUGS (ALT 637 FOR MEDICARE OP, ALT 636 FOR OP/ED)

## 2024-08-25 PROCEDURE — 83036 HEMOGLOBIN GLYCOSYLATED A1C: CPT | Performed by: INTERNAL MEDICINE

## 2024-08-25 PROCEDURE — 85025 COMPLETE CBC W/AUTO DIFF WBC: CPT

## 2024-08-25 PROCEDURE — 99232 SBSQ HOSP IP/OBS MODERATE 35: CPT | Performed by: NURSE PRACTITIONER

## 2024-08-25 PROCEDURE — 2060000001 HC INTERMEDIATE ICU ROOM DAILY

## 2024-08-25 PROCEDURE — 82947 ASSAY GLUCOSE BLOOD QUANT: CPT

## 2024-08-25 PROCEDURE — 81001 URINALYSIS AUTO W/SCOPE: CPT | Performed by: INTERNAL MEDICINE

## 2024-08-25 PROCEDURE — 2500000004 HC RX 250 GENERAL PHARMACY W/ HCPCS (ALT 636 FOR OP/ED): Performed by: INTERNAL MEDICINE

## 2024-08-25 PROCEDURE — 36415 COLL VENOUS BLD VENIPUNCTURE: CPT

## 2024-08-25 PROCEDURE — 84100 ASSAY OF PHOSPHORUS: CPT

## 2024-08-25 PROCEDURE — 2500000001 HC RX 250 WO HCPCS SELF ADMINISTERED DRUGS (ALT 637 FOR MEDICARE OP)

## 2024-08-25 PROCEDURE — 84075 ASSAY ALKALINE PHOSPHATASE: CPT

## 2024-08-25 PROCEDURE — 84443 ASSAY THYROID STIM HORMONE: CPT | Performed by: INTERNAL MEDICINE

## 2024-08-25 PROCEDURE — 2500000001 HC RX 250 WO HCPCS SELF ADMINISTERED DRUGS (ALT 637 FOR MEDICARE OP): Performed by: NURSE PRACTITIONER

## 2024-08-25 PROCEDURE — 87086 URINE CULTURE/COLONY COUNT: CPT | Mod: WESLAB | Performed by: INTERNAL MEDICINE

## 2024-08-25 PROCEDURE — 2500000004 HC RX 250 GENERAL PHARMACY W/ HCPCS (ALT 636 FOR OP/ED)

## 2024-08-25 RX ORDER — CEFTRIAXONE 1 G/50ML
1 INJECTION, SOLUTION INTRAVENOUS EVERY 24 HOURS
Status: DISCONTINUED | OUTPATIENT
Start: 2024-08-25 | End: 2024-08-26 | Stop reason: HOSPADM

## 2024-08-25 RX ORDER — LOSARTAN POTASSIUM 50 MG/1
50 TABLET ORAL DAILY
Status: DISCONTINUED | OUTPATIENT
Start: 2024-08-26 | End: 2024-08-25

## 2024-08-25 RX ORDER — LOSARTAN POTASSIUM 50 MG/1
50 TABLET ORAL DAILY
Status: DISCONTINUED | OUTPATIENT
Start: 2024-08-25 | End: 2024-08-26 | Stop reason: HOSPADM

## 2024-08-25 RX ORDER — CARVEDILOL 6.25 MG/1
6.25 TABLET ORAL 2 TIMES DAILY
Status: DISCONTINUED | OUTPATIENT
Start: 2024-08-25 | End: 2024-08-26 | Stop reason: HOSPADM

## 2024-08-25 RX ORDER — SODIUM CHLORIDE 9 MG/ML
75 INJECTION, SOLUTION INTRAVENOUS CONTINUOUS
Status: ACTIVE | OUTPATIENT
Start: 2024-08-26 | End: 2024-08-26

## 2024-08-25 ASSESSMENT — COGNITIVE AND FUNCTIONAL STATUS - GENERAL
DAILY ACTIVITIY SCORE: 24
MOBILITY SCORE: 24

## 2024-08-25 ASSESSMENT — PAIN - FUNCTIONAL ASSESSMENT
PAIN_FUNCTIONAL_ASSESSMENT: 0-10

## 2024-08-25 ASSESSMENT — PAIN SCALES - GENERAL
PAINLEVEL_OUTOF10: 0 - NO PAIN

## 2024-08-25 NOTE — PROGRESS NOTES
"Josefina Huerta is a 43 y.o. female on day 2 of admission presenting with Nausea and vomiting, unspecified vomiting type.    Subjective   Alert and oriented, denies complaints chest pain or pressure, palpitations or feeling of rapid heart rate.  Echo with significantly reduced ejection fraction.        Objective     Physical Exam  Vitals and nursing note reviewed.   Constitutional:       General: She is not in acute distress.     Appearance: She is obese. She is not ill-appearing or toxic-appearing.   HENT:      Head: Normocephalic and atraumatic.      Nose: Nose normal.      Mouth/Throat:      Mouth: Mucous membranes are moist.      Pharynx: Oropharynx is clear.   Cardiovascular:      Rate and Rhythm: Normal rate and regular rhythm.      Pulses: Normal pulses.      Heart sounds: Normal heart sounds. No murmur heard.     No friction rub. No gallop.   Pulmonary:      Effort: Pulmonary effort is normal.      Breath sounds: Normal breath sounds. No wheezing, rhonchi or rales.   Abdominal:      General: Bowel sounds are normal.      Palpations: Abdomen is soft.   Musculoskeletal:         General: Normal range of motion.      Cervical back: Normal range of motion.      Right lower leg: No edema.      Left lower leg: No edema.   Skin:     General: Skin is warm and dry.      Capillary Refill: Capillary refill takes less than 2 seconds.   Neurological:      Mental Status: She is alert and oriented to person, place, and time. Mental status is at baseline.   Psychiatric:         Mood and Affect: Mood normal.         Behavior: Behavior normal.         Thought Content: Thought content normal.         Judgment: Judgment normal.         Last Recorded Vitals  Blood pressure (!) 155/97, pulse 97, temperature 36.8 °C (98.2 °F), temperature source Oral, resp. rate 18, height 1.727 m (5' 8\"), weight 143 kg (315 lb 14.7 oz), SpO2 97%.  Intake/Output last 3 Shifts:  I/O last 3 completed shifts:  In: 5002.7 (34.9 mL/kg) [I.V.:2747.7 " (19.2 mL/kg); IV Piggyback:2255]  Out: 400 (2.8 mL/kg) [Urine:400 (0.1 mL/kg/hr)]  Weight: 143.3 kg     Relevant Results  Results for orders placed or performed during the hospital encounter of 08/23/24 (from the past 24 hour(s))   Renal function panel   Result Value Ref Range    Glucose 173 (H) 65 - 99 mg/dL    Sodium 130 (L) 133 - 145 mmol/L    Potassium 3.2 (L) 3.4 - 5.1 mmol/L    Chloride 101 97 - 107 mmol/L    Bicarbonate 19 (L) 24 - 31 mmol/L    Urea Nitrogen 17 8 - 25 mg/dL    Creatinine 1.10 0.40 - 1.60 mg/dL    eGFR 64 >60 mL/min/1.73m*2    Calcium 8.4 (L) 8.5 - 10.4 mg/dL    Phosphorus 2.1 (L) 2.5 - 4.5 mg/dL    Albumin 3.1 (L) 3.5 - 5.0 g/dL    Anion Gap 10 <=19 mmol/L   Magnesium   Result Value Ref Range    Magnesium 2.00 1.60 - 3.10 mg/dL   POCT GLUCOSE   Result Value Ref Range    POCT Glucose 177 (H) 74 - 99 mg/dL   Transthoracic Echo (TTE) Complete   Result Value Ref Range    AV pk maday 1.01 m/s    LVOT diam 2.00 cm    MV E/A ratio 0.62     Tricuspid annular plane systolic excursion 1.6 cm    LV Biplane EF 34 %    LA vol index A/L 28.2 ml/m2    LV EF 33 %    RV free wall pk S' 10.80 cm/s    LV GLS -6.0 %    LVIDd 5.19 cm    AV pk grad 4.1 mmHg    Aortic Valve Area by Continuity of Peak Velocity 2.73 cm2    LV A4C EF 29.7    POCT GLUCOSE   Result Value Ref Range    POCT Glucose 170 (H) 74 - 99 mg/dL   POCT GLUCOSE   Result Value Ref Range    POCT Glucose 169 (H) 74 - 99 mg/dL   POCT GLUCOSE   Result Value Ref Range    POCT Glucose 187 (H) 74 - 99 mg/dL   CBC and Auto Differential   Result Value Ref Range    WBC 9.6 4.4 - 11.3 x10*3/uL    nRBC 0.0 0.0 - 0.0 /100 WBCs    RBC 4.27 4.00 - 5.20 x10*6/uL    Hemoglobin 11.3 (L) 12.0 - 16.0 g/dL    Hematocrit 35.2 (L) 36.0 - 46.0 %    MCV 82 80 - 100 fL    MCH 26.5 26.0 - 34.0 pg    MCHC 32.1 32.0 - 36.0 g/dL    RDW 15.0 (H) 11.5 - 14.5 %    Platelets 340 150 - 450 x10*3/uL    Neutrophils % 56.4 40.0 - 80.0 %    Immature Granulocytes %, Automated 0.9 0.0 - 0.9  %    Lymphocytes % 29.7 13.0 - 44.0 %    Monocytes % 9.4 2.0 - 10.0 %    Eosinophils % 2.7 0.0 - 6.0 %    Basophils % 0.9 0.0 - 2.0 %    Neutrophils Absolute 5.38 1.20 - 7.70 x10*3/uL    Immature Granulocytes Absolute, Automated 0.09 0.00 - 0.70 x10*3/uL    Lymphocytes Absolute 2.84 1.20 - 4.80 x10*3/uL    Monocytes Absolute 0.90 0.10 - 1.00 x10*3/uL    Eosinophils Absolute 0.26 0.00 - 0.70 x10*3/uL    Basophils Absolute 0.09 0.00 - 0.10 x10*3/uL   Comprehensive Metabolic Panel   Result Value Ref Range    Glucose 147 (H) 65 - 99 mg/dL    Sodium 137 133 - 145 mmol/L    Potassium 3.8 3.4 - 5.1 mmol/L    Chloride 105 97 - 107 mmol/L    Bicarbonate 20 (L) 24 - 31 mmol/L    Urea Nitrogen 16 8 - 25 mg/dL    Creatinine 1.10 0.40 - 1.60 mg/dL    eGFR 64 >60 mL/min/1.73m*2    Calcium 8.2 (L) 8.5 - 10.4 mg/dL    Albumin 3.0 (L) 3.5 - 5.0 g/dL    Alkaline Phosphatase 73 35 - 125 U/L    Total Protein 6.2 5.9 - 7.9 g/dL    AST 21 5 - 40 U/L    Bilirubin, Total 0.3 0.1 - 1.2 mg/dL    ALT 20 5 - 40 U/L    Anion Gap 12 <=19 mmol/L   Magnesium   Result Value Ref Range    Magnesium 2.00 1.60 - 3.10 mg/dL   Phosphorus   Result Value Ref Range    Phosphorus 3.4 2.5 - 4.5 mg/dL         Assessment/Plan   Assessment & Plan  Nausea and vomiting, unspecified vomiting type    Non-STEMI (non-ST elevated myocardial infarction) (Multi)    Acute systolic heart failure (Multi)    Systolic heart failure  Nausea, vomiting, diarrhea  Dehydration  DKA  Lactic acidosis  Elevated high-sensitivity troponin  Hypertensive urgency  Sinus tachycardia  Obesity     Overall impression:        8/24: As above, presents with symptoms of nausea, vomiting, diarrhea, and fatigue over 2 to 3 days.  Found to be in early DKA and significant dehydrated.  received initial treatment for this.  Was noted to have a mildly elevated lactic level of 2.2.  She was tachycardic with rates in the 140s.  Additionally was noted to be in a hypertensive urgency with systolic initially  above 180.  She did have an incidental finding of elevated high-sensitivity troponin values of 282 and 297.  Although these levels are significantly elevated, they are flat, not generally consistent with evolving acute coronary syndrome.  She does have nonspecific T wave inversions which are new compared to previous EKG in leads II, aVF, and V4 through V6.  No significant findings on abdominal CT or CT chest angio.  It is most likely elevated high-sensitivity troponin is reflective of her significant dehydration, tachycardia, hypertensive urgency and mild lactic acidosis.  Despite these findings she has not had any complaints of chest pain or pressure, palpitations, feeling rapid heart rate, or other anginal equivalents.  She is breathing comfortably on room air and appears euvolemic.  At this point of living echocardiogram will be the next best tool to help us decide if patient is undergoing any significant coronary event.  Will assess for new wall motion abnormalities, or reduction in ejection fraction.  If there are no significant findings on echocardiogram believe the patient is stable from a cardiac perspective and would classify this as a type II myocardial infarction.  If the echocardiogram returns without significant findings we will stop the heparin drip; however, if there are wall motion abnormalities starting to reduce ejection fraction the patient will need to proceed with a cardiac catheterization most likely on Monday.  Will follow with you.    8/25: Patient quite stable overnight.  Laboratory diagnostics have improved.  Creatinine stable at 1.1.  Patient did undergo echocardiographic yesterday which revealed a significantly reduced ejection fraction of 30 to 35% with wall motion abnormalities and a diastolic dysfunction with a pattern possibly relative to a stress-induced cardiomyopathy.  On telemetry monitor she remained in a sinus rhythm without significant ectopy.  Her blood pressure is improving.   Given these findings we will need to determine if the patient's reduced ejection fraction is relative to ischemic disease.  Tentatively the patient is planned for a cardiac catheterization on Monday morning.  Her heparin drip was discontinued, and I am in agreement with this as the patient has remained chest pain-free and describes no other anginal equivalents.  Given her significant reduced ejection fraction I have increased her carvedilol, and I have stopped her lisinopril in favor of losartan as the patient will likely require Entresto therapy in the future.  I had a over 20-minute long conversation with the patient concerning our findings; she states understanding and is agreeable to proceed with a cardiac catheterization.  Will follow with you.      I spent 35 minutes in the professional and overall care of this patient.      Nj Lara, APRN-CNP

## 2024-08-25 NOTE — CARE PLAN
The patient's goals for the shift include      The clinical goals for the shift include continue to monitor blood sugars    Over the shift, the patient did not make progress toward the following goals. Barriers to progression include . Recommendations to address these barriers include .

## 2024-08-25 NOTE — CARE PLAN
The clinical goals for the shift include Get adequate sleep and keep sugars under control    Over the shift, the patient did make progress toward the following goals.       Problem: Fall/Injury  Goal: Be free from injury by end of the shift  Outcome: Progressing     Problem: Pain - Adult  Goal: Verbalizes/displays adequate comfort level or baseline comfort level  Outcome: Progressing     Problem: Diabetes  Goal: Maintain glucose levels >70mg/dl to <250mg/dl throughout shift  Outcome: Progressing     Problem: Diabetes  Goal: Vital signs within normal range for age by end of shift  Outcome: Progressing

## 2024-08-25 NOTE — PROGRESS NOTES
Christ Huerta is a 43 y.o. female on day 2 of admission presenting with Nausea and vomiting, unspecified vomiting type.      Subjective   Patient was admitted in hospital with DKA, started on insulin drip  Admission patient had elevated troponin, started on heparin drip, heparin drip discontinued  She denies chest pain shortness of breath.  Hospitalist group was called to take care of the patient once she is out of ICU.  Patient denied fever or chills.  She denied urinary urgency .  Patient stated she has not been compliant with metformin.       Objective     Last Recorded Vitals  BP (!) 171/109   Pulse 98   Temp 37.1 °C (98.8 °F) (Oral)   Resp 18   Wt 143 kg (315 lb 14.7 oz)   SpO2 95%   Intake/Output last 3 Shifts:    Intake/Output Summary (Last 24 hours) at 8/25/2024 1210  Last data filed at 8/25/2024 0900  Gross per 24 hour   Intake 480 ml   Output --   Net 480 ml       Admission Weight  Weight: 129 kg (285 lb) (08/23/24 1354)    Daily Weight  08/25/24 : 143 kg (315 lb 14.7 oz)    Image Results  Transthoracic Echo (TTE) [place] [address]             Phone [phone]    TRANSTHORACIC ECHOCARDIOGRAM REPORT    Patient Name:     CHRIST HUERTA      Reading Physician:   [id number] Rice DO  Study Date:       8/24/2024            Ordering Provider:   [id number] SUNDAR HERRING  MRN/PID:          [id number]             Fellow:  Accession#:       [id number]         Nurse:  Date of           1980 / 43 years Sonographer:         Abi NICOLAS  Birth/Age:  Gender:           F                    Additional Staff:  Height:           172.72 cm            Admit Date:          8/23/2024  Weight:           139.71 kg            Admission Status:    Inpatient - Routine  BSA / BMI:        2.46 m2 / 46.83      Department  Location:                    kg/m2  Blood Pressure: 140 /96 mmHg    Study Type:    TRANSTHORACIC ECHO (TTE) COMPLETE  Diagnosis/ICD: Non ST elevation (NSTEMI) myocardial infarction-I21.4  Indication:    NSTEMI  CPT Codes:     Echo Complete w Full Doppler-[id number]    Patient History:  Pertinent History: Diabetes HTN.    Study Detail: The following Echo studies were performed: 2D, M-Mode, Doppler,                color flow and Strain. Definity used as a contrast agent for                endocardial border definition.       PHYSICIAN INTERPRETATION:  Left Ventricle: Left ventricular ejection fraction is moderately decreased, by visual estimate at 30-35%. Wall motion is abnormal. The left ventricular cavity size is normal. Left Ventricular Global Longitudinal Strain - -6.0 %. Spectral Doppler shows an impaired relaxation pattern of left ventricular diastolic filling. Global hypokinesis with basilar sparing, may represent stress-induced cardiomyopathy.  Left Atrium: The left atrium is normal in size.  Right Ventricle: The right ventricle is normal in size. There is normal right ventricular global systolic function.  Right Atrium: The right atrium is normal in size.  Aortic Valve: The aortic valve is trileaflet. There is no evidence of aortic valve regurgitation. The peak instantaneous gradient of the aortic valve is 4.1 mmHg.  Mitral Valve: The mitral valve is normal in structure. There is no evidence of mitral valve regurgitation.  Tricuspid Valve: The tricuspid valve is structurally normal. No evidence of tricuspid regurgitation.  Pulmonic Valve: The pulmonic valve is not well visualized. The pulmonic valve regurgitation was not well visualized.  Pericardium: There is no pericardial effusion noted.  Aorta: The aortic root is normal.  Systemic Veins: The inferior vena cava appears dilated. There is IVC inspiratory collapse greater than 50%.       CONCLUSIONS:   1. Left ventricular ejection fraction is moderately decreased,  by visual estimate at 30-35%.   2. Abnormal wall motion.   3. Spectral Doppler shows an impaired relaxation pattern of left ventricular diastolic filling.   4. Global hypokinesis with basilar sparing, may represent stress-induced cardiomyopathy.   5. There is normal right ventricular global systolic function.    QUANTITATIVE DATA SUMMARY:  2D MEASUREMENTS:                            Normal Ranges:  LAs:           4.90 cm    (2.7-4.0cm)  IVSd:          1.14 cm    (0.6-1.1cm)  LVPWd:         1.56 cm    (0.6-1.1cm)  LVIDd:         5.19 cm    (3.9-5.9cm)  LVIDs:         4.57 cm  LV Mass Index: 119.3 g/m2  LV % FS        11.9 %    LA VOLUME:                                Normal Ranges:  LA Vol A4C:        73.6 ml    (22+/-6mL/m2)  LA Vol A2C:        61.9 ml  LA Vol BP:         69.3 ml  LA Vol Index A4C:  30.0ml/m2  LA Vol Index A2C:  25.2 ml/m2  LA Vol Index BP:   28.2 ml/m2  LA Area A4C:       20.7 cm2  LA Area A2C:       19.5 cm2  LA Major Axis A4C: 5.0 cm  LA Major Axis A2C: 5.2 cm  LA Volume Index:   26.2 ml/m2  LA Vol A4C:        67.1 ml  LA Vol A2C:        59.0 ml  LA Vol Index BSA:  25.7 ml/m2    RA VOLUME BY A/L METHOD:                               Normal Ranges:  RA Vol A4C:        23.0 ml   (8.3-19.5ml)  RA Vol Index A4C:  9.4 ml/m2  RA Area A4C:       9.8 cm2  RA Major Axis A4C: 3.5 cm    M-MODE MEASUREMENTS:                   Normal Ranges:  Ao Root: 3.00 cm (2.0-3.7cm)  LAs:     4.00 cm (2.7-4.0cm)    AORTA MEASUREMENTS:                       Normal Ranges:  Ao Sinus, d: 3.20 cm (2.1-3.5cm)  Ao STJ, d:   3.09 cm (1.7-3.4cm)  Asc Ao, d:   3.60 cm (2.1-3.4cm)    LV SYSTOLIC FUNCTION BY 2D PLANIMETRY (MOD):                                         Normal Ranges:  EF-A4C View:                      30 % (>=55%)  EF-A2C View:                      34 %  EF-Biplane:                       34 %  EF-Visual:                        33 %  LV EF Reported:                   33 %  Global Longitudinal Strain (GLS): -6  %    LV DIASTOLIC FUNCTION:                               Normal Ranges:  MV Peak E:        0.53 m/s   (0.7-1.2 m/s)  MV Peak A:        0.85 m/s   (0.42-0.7 m/s)  E/A Ratio:        0.62       (1.0-2.2)  MV e'             0.050 m/s  (>8.0)  MV lateral e'     0.06 m/s  MV medial e'      0.03 m/s  E/e' Ratio:       10.65      (<8.0)  PulmV Sys Adrian:    36.70 cm/s  PulmV Johnson Adrian:   24.20 cm/s  PulmV S/D Adrian:    1.50  PulmV A Revs Adrian: 29.40 cm/s    MITRAL VALVE:                  Normal Ranges:  MV DT: 136 msec (150-240msec)    AORTIC VALVE:                          Normal Ranges:  AoV Vmax:      1.01 m/s (<=1.7m/s)  AoV Peak P.1 mmHg (<20mmHg)  LVOT Max Adrian:  0.88 m/s (<=1.1m/s)  LVOT VTI:      14.40 cm  LVOT Diameter: 2.00 cm  (1.8-2.4cm)  AoV Area,Vmax: 2.73 cm2 (2.5-4.5cm2)       RIGHT VENTRICLE:  RV Basal 3.61 cm  RV Mid   3.03 cm  RV Major 7.0 cm  TAPSE:   16.3 mm  RV s'    0.11 m/s    TRICUSPID VALVE/RVSP:                    Normal Ranges:  IVC Diam: 2.58 cm    PULMONIC VALVE:                          Normal Ranges:  PV Accel Time: 85 msec  (>120ms)  PV Max Adrian:    0.6 m/s  (0.6-0.9m/s)  PV Max P.6 mmHg    Pulmonary Veins:  PulmV A Revs Adrian: 29.40 cm/s  PulmV Johnson Adrian:   24.20 cm/s  PulmV S/D Adrian:    1.50  PulmV Sys Adrian:    36.70 cm/s       [id number] Art Rice DO  Electronically signed on 2024 at 1:02:10 PM       ** Final **      Physical Exam  General: Very pleasant, morbidly obese BMI 48, cooperating during physical exam.  HEENT: Pupils are equal and reactive to light and commendation , oral mucosa moist, no JVD   Cardiovascular: Normal sinus rhythm, no MRG.  Lungs: Clear to auscultation bilaterally, no wheezing, no crackles, no dullness to percussion.  Abdomen: No hepatosplenomegaly appreciated, soft , not tender, positive bowel sounds, positive bowel movement.  Neuro: Alert and oriented x3, strength in upper and lower extremities , sensation intact.  Psych: Patient had great insight was going  on  Musculoskeletal: No swelling in lower extremities, no limitation in range of motion.  Vascular: Pulses are intact in upper and lower extremities  Skin: No petechiae, ecchymosis or other stigmata for dermatology disease.     Assessment/Plan        Diabetes ketoacidosis  Likely improved DKA resolved  Continue with insulin sliding scale  Check hemoglobin A1c today.  Patient has been noncompliant with medication  She is post to be on metformin 1 g twice daily  I advised the patient to be compliant with medication.  Plan to start on metformin 48 hours after cardiac cath    NSTEMI  Had elevated troponin on admission  Started on heparin drip, heparin drip discontinued  Continue with current medication.  Evaluated by Dr. Rice from cardiology services    Acute congestive heart failure with systolic dysfunction  2D echo done in hospital revealed ejection fraction 30 to 35%  Plan for cardiac cath in a.m. to rule out ischemic cardiomyopathy  Continue with current medication.  Patient is on Coreg, lisinopril.    Morbid obesity  BMI 48.  Advise regarding diet    Leukocytosis most likely reactive to DKA  UTI  Start low-dose of ceftriaxone  Check urine culture    DVT prophylaxis    Discussed in detail with patient  CBC and BMP in AM.  Continue with current medication  Check hemoglobin A1c  Check TSH  Plan for cardiac cath in a.m.  Keep n.p.o. after midnight      Time spent with patient 36 minutes             Alonso Herndon MD

## 2024-08-26 ENCOUNTER — PHARMACY VISIT (OUTPATIENT)
Dept: PHARMACY | Facility: CLINIC | Age: 44
End: 2024-08-26
Payer: COMMERCIAL

## 2024-08-26 ENCOUNTER — APPOINTMENT (OUTPATIENT)
Dept: CARDIOLOGY | Facility: HOSPITAL | Age: 44
DRG: 280 | End: 2024-08-26
Payer: COMMERCIAL

## 2024-08-26 VITALS
BODY MASS INDEX: 44.41 KG/M2 | SYSTOLIC BLOOD PRESSURE: 167 MMHG | HEIGHT: 68 IN | OXYGEN SATURATION: 97 % | RESPIRATION RATE: 19 BRPM | WEIGHT: 293 LBS | HEART RATE: 77 BPM | TEMPERATURE: 97.5 F | DIASTOLIC BLOOD PRESSURE: 93 MMHG

## 2024-08-26 PROBLEM — N18.2 STAGE 2 CHRONIC KIDNEY DISEASE: Status: ACTIVE | Noted: 2024-08-26

## 2024-08-26 PROBLEM — R82.90 ABNORMAL FINDING ON URINALYSIS: Status: ACTIVE | Noted: 2024-08-26

## 2024-08-26 PROBLEM — E11.10 TYPE 2 DIABETES MELLITUS WITH KETOACIDOSIS WITHOUT COMA, WITHOUT LONG-TERM CURRENT USE OF INSULIN (MULTI): Status: ACTIVE | Noted: 2024-08-26

## 2024-08-26 PROBLEM — I10 PRIMARY HYPERTENSION: Status: ACTIVE | Noted: 2024-08-26

## 2024-08-26 PROBLEM — D72.829 LEUKOCYTOSIS: Status: ACTIVE | Noted: 2024-08-26

## 2024-08-26 LAB
ANION GAP SERPL CALC-SCNC: 11 MMOL/L
ATRIAL RATE: 124 BPM
BASOPHILS # BLD AUTO: 0.09 X10*3/UL (ref 0–0.1)
BASOPHILS NFR BLD AUTO: 1 %
BUN SERPL-MCNC: 17 MG/DL (ref 8–25)
CALCIUM SERPL-MCNC: 8.7 MG/DL (ref 8.5–10.4)
CHLORIDE SERPL-SCNC: 103 MMOL/L (ref 97–107)
CO2 SERPL-SCNC: 22 MMOL/L (ref 24–31)
CREAT SERPL-MCNC: 1 MG/DL (ref 0.4–1.6)
EGFRCR SERPLBLD CKD-EPI 2021: 71 ML/MIN/1.73M*2
EOSINOPHIL # BLD AUTO: 0.38 X10*3/UL (ref 0–0.7)
EOSINOPHIL NFR BLD AUTO: 4.3 %
ERYTHROCYTE [DISTWIDTH] IN BLOOD BY AUTOMATED COUNT: 14.9 % (ref 11.5–14.5)
GLUCOSE BLD MANUAL STRIP-MCNC: 144 MG/DL (ref 74–99)
GLUCOSE BLD MANUAL STRIP-MCNC: 162 MG/DL (ref 74–99)
GLUCOSE BLD MANUAL STRIP-MCNC: 197 MG/DL (ref 74–99)
GLUCOSE SERPL-MCNC: 154 MG/DL (ref 65–99)
HCT VFR BLD AUTO: 36.5 % (ref 36–46)
HGB BLD-MCNC: 12 G/DL (ref 12–16)
HOLD SPECIMEN: NORMAL
IMM GRANULOCYTES # BLD AUTO: 0.07 X10*3/UL (ref 0–0.7)
IMM GRANULOCYTES NFR BLD AUTO: 0.8 % (ref 0–0.9)
LYMPHOCYTES # BLD AUTO: 2.52 X10*3/UL (ref 1.2–4.8)
LYMPHOCYTES NFR BLD AUTO: 28.8 %
MAGNESIUM SERPL-MCNC: 2 MG/DL (ref 1.6–3.1)
MCH RBC QN AUTO: 26.5 PG (ref 26–34)
MCHC RBC AUTO-ENTMCNC: 32.9 G/DL (ref 32–36)
MCV RBC AUTO: 81 FL (ref 80–100)
MONOCYTES # BLD AUTO: 0.94 X10*3/UL (ref 0.1–1)
MONOCYTES NFR BLD AUTO: 10.7 %
NEUTROPHILS # BLD AUTO: 4.75 X10*3/UL (ref 1.2–7.7)
NEUTROPHILS NFR BLD AUTO: 54.4 %
NRBC BLD-RTO: 0 /100 WBCS (ref 0–0)
P AXIS: 45 DEGREES
P OFFSET: 193 MS
P ONSET: 142 MS
PHOSPHATE SERPL-MCNC: 4.1 MG/DL (ref 2.5–4.5)
PLATELET # BLD AUTO: 361 X10*3/UL (ref 150–450)
POTASSIUM SERPL-SCNC: 3.6 MMOL/L (ref 3.4–5.1)
PR INTERVAL: 150 MS
Q ONSET: 217 MS
QRS COUNT: 20 BEATS
QRS DURATION: 86 MS
QT INTERVAL: 414 MS
QTC CALCULATION(BAZETT): 594 MS
QTC FREDERICIA: 527 MS
R AXIS: -63 DEGREES
RBC # BLD AUTO: 4.53 X10*6/UL (ref 4–5.2)
SODIUM SERPL-SCNC: 136 MMOL/L (ref 133–145)
T AXIS: 182 DEGREES
T OFFSET: 424 MS
VENTRICULAR RATE: 124 BPM
WBC # BLD AUTO: 8.8 X10*3/UL (ref 4.4–11.3)

## 2024-08-26 PROCEDURE — 99153 MOD SED SAME PHYS/QHP EA: CPT | Performed by: INTERNAL MEDICINE

## 2024-08-26 PROCEDURE — 2500000004 HC RX 250 GENERAL PHARMACY W/ HCPCS (ALT 636 FOR OP/ED): Performed by: INTERNAL MEDICINE

## 2024-08-26 PROCEDURE — 80048 BASIC METABOLIC PNL TOTAL CA: CPT | Performed by: STUDENT IN AN ORGANIZED HEALTH CARE EDUCATION/TRAINING PROGRAM

## 2024-08-26 PROCEDURE — 2500000002 HC RX 250 W HCPCS SELF ADMINISTERED DRUGS (ALT 637 FOR MEDICARE OP, ALT 636 FOR OP/ED): Performed by: STUDENT IN AN ORGANIZED HEALTH CARE EDUCATION/TRAINING PROGRAM

## 2024-08-26 PROCEDURE — B2111ZZ FLUOROSCOPY OF MULTIPLE CORONARY ARTERIES USING LOW OSMOLAR CONTRAST: ICD-10-PCS | Performed by: INTERNAL MEDICINE

## 2024-08-26 PROCEDURE — C1760 CLOSURE DEV, VASC: HCPCS | Performed by: INTERNAL MEDICINE

## 2024-08-26 PROCEDURE — 2500000004 HC RX 250 GENERAL PHARMACY W/ HCPCS (ALT 636 FOR OP/ED): Performed by: STUDENT IN AN ORGANIZED HEALTH CARE EDUCATION/TRAINING PROGRAM

## 2024-08-26 PROCEDURE — 99152 MOD SED SAME PHYS/QHP 5/>YRS: CPT | Performed by: INTERNAL MEDICINE

## 2024-08-26 PROCEDURE — 2720000007 HC OR 272 NO HCPCS: Performed by: INTERNAL MEDICINE

## 2024-08-26 PROCEDURE — 83735 ASSAY OF MAGNESIUM: CPT | Performed by: STUDENT IN AN ORGANIZED HEALTH CARE EDUCATION/TRAINING PROGRAM

## 2024-08-26 PROCEDURE — 85025 COMPLETE CBC W/AUTO DIFF WBC: CPT | Performed by: STUDENT IN AN ORGANIZED HEALTH CARE EDUCATION/TRAINING PROGRAM

## 2024-08-26 PROCEDURE — 93458 L HRT ARTERY/VENTRICLE ANGIO: CPT | Performed by: INTERNAL MEDICINE

## 2024-08-26 PROCEDURE — 93005 ELECTROCARDIOGRAM TRACING: CPT

## 2024-08-26 PROCEDURE — C1894 INTRO/SHEATH, NON-LASER: HCPCS | Performed by: INTERNAL MEDICINE

## 2024-08-26 PROCEDURE — RXMED WILLOW AMBULATORY MEDICATION CHARGE

## 2024-08-26 PROCEDURE — 2500000005 HC RX 250 GENERAL PHARMACY W/O HCPCS: Performed by: INTERNAL MEDICINE

## 2024-08-26 PROCEDURE — 2550000001 HC RX 255 CONTRASTS: Performed by: INTERNAL MEDICINE

## 2024-08-26 PROCEDURE — 82947 ASSAY GLUCOSE BLOOD QUANT: CPT

## 2024-08-26 PROCEDURE — 84100 ASSAY OF PHOSPHORUS: CPT | Performed by: STUDENT IN AN ORGANIZED HEALTH CARE EDUCATION/TRAINING PROGRAM

## 2024-08-26 PROCEDURE — 4A023N7 MEASUREMENT OF CARDIAC SAMPLING AND PRESSURE, LEFT HEART, PERCUTANEOUS APPROACH: ICD-10-PCS | Performed by: INTERNAL MEDICINE

## 2024-08-26 PROCEDURE — 36415 COLL VENOUS BLD VENIPUNCTURE: CPT | Performed by: STUDENT IN AN ORGANIZED HEALTH CARE EDUCATION/TRAINING PROGRAM

## 2024-08-26 PROCEDURE — 2500000001 HC RX 250 WO HCPCS SELF ADMINISTERED DRUGS (ALT 637 FOR MEDICARE OP): Performed by: STUDENT IN AN ORGANIZED HEALTH CARE EDUCATION/TRAINING PROGRAM

## 2024-08-26 PROCEDURE — C1769 GUIDE WIRE: HCPCS | Performed by: INTERNAL MEDICINE

## 2024-08-26 RX ORDER — METFORMIN HYDROCHLORIDE 1000 MG/1
1000 TABLET ORAL 2 TIMES DAILY
Qty: 60 TABLET | Refills: 0 | Status: SHIPPED | OUTPATIENT
Start: 2024-08-26 | End: 2024-09-25

## 2024-08-26 RX ORDER — CARVEDILOL 6.25 MG/1
6.25 TABLET ORAL 2 TIMES DAILY
Qty: 60 TABLET | Refills: 0 | Status: SHIPPED | OUTPATIENT
Start: 2024-08-26 | End: 2024-09-25

## 2024-08-26 RX ORDER — MIDAZOLAM HYDROCHLORIDE 1 MG/ML
INJECTION, SOLUTION INTRAMUSCULAR; INTRAVENOUS AS NEEDED
Status: DISCONTINUED | OUTPATIENT
Start: 2024-08-26 | End: 2024-08-26 | Stop reason: HOSPADM

## 2024-08-26 RX ORDER — LIDOCAINE HYDROCHLORIDE 10 MG/ML
INJECTION, SOLUTION EPIDURAL; INFILTRATION; INTRACAUDAL; PERINEURAL AS NEEDED
Status: DISCONTINUED | OUTPATIENT
Start: 2024-08-26 | End: 2024-08-26 | Stop reason: HOSPADM

## 2024-08-26 RX ORDER — DAPAGLIFLOZIN 10 MG/1
10 TABLET, FILM COATED ORAL DAILY
Qty: 30 TABLET | Refills: 0 | Status: SHIPPED | OUTPATIENT
Start: 2024-08-26 | End: 2024-09-25

## 2024-08-26 RX ORDER — FENTANYL CITRATE 50 UG/ML
INJECTION, SOLUTION INTRAMUSCULAR; INTRAVENOUS AS NEEDED
Status: DISCONTINUED | OUTPATIENT
Start: 2024-08-26 | End: 2024-08-26 | Stop reason: HOSPADM

## 2024-08-26 RX ORDER — IODIXANOL 320 MG/ML
INJECTION, SOLUTION INTRAVASCULAR AS NEEDED
Status: DISCONTINUED | OUTPATIENT
Start: 2024-08-26 | End: 2024-08-26 | Stop reason: HOSPADM

## 2024-08-26 RX ORDER — LOSARTAN POTASSIUM 50 MG/1
50 TABLET ORAL DAILY
Qty: 30 TABLET | Refills: 0 | Status: SHIPPED | OUTPATIENT
Start: 2024-08-27 | End: 2024-09-26

## 2024-08-26 RX ORDER — ATORVASTATIN CALCIUM 40 MG/1
40 TABLET, FILM COATED ORAL DAILY
Qty: 30 TABLET | Refills: 0 | Status: SHIPPED | OUTPATIENT
Start: 2024-08-26 | End: 2024-09-25

## 2024-08-26 RX ORDER — HEPARIN SODIUM 1000 [USP'U]/ML
INJECTION, SOLUTION INTRAVENOUS; SUBCUTANEOUS AS NEEDED
Status: DISCONTINUED | OUTPATIENT
Start: 2024-08-26 | End: 2024-08-26 | Stop reason: HOSPADM

## 2024-08-26 RX ORDER — SODIUM CHLORIDE 9 MG/ML
3 INJECTION, SOLUTION INTRAVENOUS CONTINUOUS
Status: ACTIVE | OUTPATIENT
Start: 2024-08-26 | End: 2024-08-26

## 2024-08-26 ASSESSMENT — PAIN SCALES - GENERAL
PAINLEVEL_OUTOF10: 0 - NO PAIN

## 2024-08-26 ASSESSMENT — PAIN - FUNCTIONAL ASSESSMENT
PAIN_FUNCTIONAL_ASSESSMENT: 0-10

## 2024-08-26 ASSESSMENT — COGNITIVE AND FUNCTIONAL STATUS - GENERAL
MOBILITY SCORE: 24
DAILY ACTIVITIY SCORE: 24

## 2024-08-26 NOTE — CONSULTS
"Nutrition Assessement Note    Nutrition Assessment    Reason for Assessment: Dietitian discretion (DKA)    Reason for Hospital Admission:  Josefina Huerta is a 43 y.o. female who is admitted for DKA. Admitted with NVD x 3 days. , A1c 9.7. Noted to be non-compliant with DM meds d/t insurance issues. Cardiac cath today, no blockages found. Patient reports good appetite today, feeling much better. Agreeable to CCD diet education at this time.    No past medical history on file.   Past Surgical History:   Procedure Laterality Date    CARDIAC CATHETERIZATION N/A 8/26/2024    Procedure: Left Heart Cath;  Surgeon: Art Rice DO;  Location: [facility];  Service: Cardiovascular;  Laterality: N/A;       Nutrition History:  Food and Nutrient History: patient reports good aapetite  Energy Intake: Good > 75 %  Food Allergies/Intolerances:  None  GI Symptoms: None  Oral Problems: None    Anthropometrics:  Ht: 172.7 cm (5' 8\"), Wt: 143 kg (315 lb 4.1 oz), BMI: 47.95  IBW/kg (Dietitian Calculated): 63.63 kg  Percent of IBW: 225 %  Adjusted Body Weight (kg): 84 kg    Weight Change:  Daily Weight  08/26/24 : 143 kg (315 lb 4.1 oz)  03/06/23 : 135 kg (298 lb 9.6 oz)  05/18/22 : 128 kg (283 lb)  02/16/22 : 129 kg (284 lb)  12/14/21 : 136 kg (299 lb)     Weight History / % Weight Change: patient reports stable weight  Significant Weight Loss: No     Nutrition Focused Physical Exam Findings:      Nutrition Significant Labs:  Lab Results   Component Value Date    WBC 8.8 08/26/2024    HGB 12.0 08/26/2024    HCT 36.5 08/26/2024     08/26/2024    CHOL 254 (H) 02/11/2022    TRIG 282 (H) 02/11/2022    HDL 38.9 (A) 02/11/2022    ALT 20 08/25/2024    AST 21 08/25/2024     08/26/2024    K 3.6 08/26/2024     08/26/2024    CREATININE 1.00 08/26/2024    BUN 17 08/26/2024    CO2 22 (L) 08/26/2024    TSH 0.41 08/25/2024    HGBA1C 9.7 (H) 08/25/2024     Nutrition Specific Medications:  atorvastatin, 40 mg, oral, " Nightly  carvedilol, 6.25 mg, oral, BID  cefTRIAXone, 1 g, intravenous, q24h  enoxaparin, 40 mg, subcutaneous, q12h LAYLA  insulin lispro, 0-15 Units, subcutaneous, Before meals & nightly  losartan, 50 mg, oral, Daily         Dietary Orders (From admission, onward)       Start     Ordered    08/26/24 1133  Adult diet Cardiac; 70 gm fat; 2 - 3 grams Sodium  Diet effective now        Question Answer Comment   Diet type Cardiac    Fat restriction: 70 gm fat    Sodium restriction: 2 - 3 grams Sodium        08/26/24 1132                      Estimated Needs:   Estimated Energy Needs  Total Energy Estimated Needs (kCal): 2100 kCal  Total Estimated Energy Need per Day (kCal/kg): 25 kCal/kg  Method for Estimating Needs: Adj Wt    Estimated Protein Needs  Total Protein Estimated Needs (g): 84 g  Total Protein Estimated Needs (g/kg): 1 g/kg  Method for Estimating Needs: Adj Wt    Estimated Fluid Needs  Total Fluid Estimated Needs (mL): 2100 mL  Method for Estimating Needs: 1 mL/kcal      Nutrition Diagnosis   Nutrition Diagnosis:     Nutrition Diagnosis  Patient has Nutrition Diagnosis: Yes  Diagnosis Status (1): New  Nutrition Diagnosis 1: Food and nutrition related knowledge deficit  Related to (1): lack of prior exposure to accurate nutrition related information  As Evidenced by (1): conditions associated with diagnosis  Additional Nutrition Diagnosis: Diagnosis 2  Diagnosis Status (2): New  Nutrition Diagnosis 2: Altered nutrition related to laboratory values  Related to (2): physiological causes  As Evidenced by (2): A1c 9.7       Nutrition Interventions/Recommendations   Nutrition Interventions and Recommendations:    Nutrition Prescription:  Individualized Nutrition Prescription Provided for : 2100 calories, 84 gm protein via CCD diet    Nutrition Interventions:   Food and/or Nutrient Delivery Interventions  Interventions: Meals and snacks  Meals and Snacks: Carbohydrate-modified diet  Goal: provide as  ordered    Education Documentation  Nutrition Care Manual, taught by Natalie Jo, ERNESTO, LD at 8/26/2024  4:53 PM.  Learner: Patient  Readiness: Eager  Method: Explanation, Handout  Response: Verbalizes Understanding  Comment: Provide handout/booklet for carbohydrate controlled diet. Explained and answered patient questions.,           Nutrition Monitoring and Evaluation   Monitoring/Evaluation:   Food/Nutrient Related History Monitoring  Monitoring and Evaluation Plan: Energy intake  Energy Intake: Estimated energy intake  Criteria: pt to consume >/= 75% estimated needs  Additional Plans: pt will plan meals within prescribed guidelines     Biochemical Data, Medical Tests and Procedures  Monitoring and Evaluation Plan: Glucose/endocrine profile  Glucose/Endocrine Profile: Glucose, casual, Hemoglobin A1c (HgbA1c)  Criteria: labs will trend towards desirable range       Time Spent/Follow-up:   Follow Up  Time Spent (min): 30 minutes  Last Date of Nutrition Visit: 08/26/24  Nutrition Follow-Up Needed?: 7-10 days  Follow up Comment: 9/2/24

## 2024-08-26 NOTE — ASSESSMENT & PLAN NOTE
New diagnosis  Echo showing EF 30-35% with global hypokinesis and diastolic dysfunction concerning for possible stress-induced cardiomyopathy  Management per cardiology  Cardiac cath planned for today  Would likely benefit from starting SGLT2 inhibitor given hx of DM and now new CHF, defer to cardiology   GDMT per cardiology

## 2024-08-26 NOTE — ASSESSMENT & PLAN NOTE
On admission, , AG >19, LA 3.4, BHB 3.00, pH 7.42  HbA1c 9.7%, reportedly noncompliant with home metformin  S/p insulin gtt  Started on SSI   Metformin held  Consider adding SGLT2 inhibitor, will defer to cardiology  Hypoglycemia protocol  Given that HbA1c <10%, will plan to discharge patient on oral diabetic medications, she will need to follow up with her PCP for further management  Consider starting GLP1 as outpatient

## 2024-08-26 NOTE — CARE PLAN
The patient's goals for the shift include   rest  Problem: Skin  Goal: Participates in plan/prevention/treatment measures  8/26/2024 0114 by Zoe Kenny RN  Outcome: Progressing  Flowsheets (Taken 8/26/2024 0114)  Participates in plan/prevention/treatment measures:   Discuss with provider PT/OT consult   Elevate heels   Increase activity/out of bed for meals  8/26/2024 0114 by Zoe Kenny RN  Outcome: Progressing  Flowsheets (Taken 8/26/2024 0114)  Participates in plan/prevention/treatment measures:   Discuss with provider PT/OT consult   Elevate heels   Increase activity/out of bed for meals  Goal: Prevent/manage excess moisture  8/26/2024 0114 by Zoe Kenny RN  Outcome: Progressing  Flowsheets (Taken 8/26/2024 0114)  Prevent/manage excess moisture:   Cleanse incontinence/protect with barrier cream   Monitor for/manage infection if present  8/26/2024 0114 by Zoe Kenny RN  Outcome: Progressing  Flowsheets (Taken 8/26/2024 0114)  Prevent/manage excess moisture:   Cleanse incontinence/protect with barrier cream   Monitor for/manage infection if present  Goal: Prevent/minimize sheer/friction injuries  8/26/2024 0114 by Zoe Kenny RN  Outcome: Progressing  Flowsheets (Taken 8/26/2024 0114)  Prevent/minimize sheer/friction injuries:   Increase activity/out of bed for meals   HOB 30 degrees or less  8/26/2024 0114 by Zoe Kenny RN  Outcome: Progressing  Flowsheets (Taken 8/26/2024 0114)  Prevent/minimize sheer/friction injuries:   Increase activity/out of bed for meals   HOB 30 degrees or less     Problem: Fall/Injury  Goal: Be free from injury by end of the shift  8/26/2024 0114 by Zoe Kenny RN  Outcome: Progressing  8/26/2024 0114 by Zoe Kenny RN  Outcome: Progressing  Goal: Verbalize understanding of personal risk factors for fall in the hospital  8/26/2024 0114 by Zoe Kenny RN  Outcome: Progressing  8/26/2024 0114 by Zoe Kenny RN  Outcome: Progressing  Goal: Verbalize understanding of  risk factor reduction measures to prevent injury from fall in the home  8/26/2024 0114 by Zoe Kenny RN  Outcome: Progressing  8/26/2024 0114 by Zoe Kenny RN  Outcome: Progressing     Problem: Pain - Adult  Goal: Verbalizes/displays adequate comfort level or baseline comfort level  8/26/2024 0114 by Zoe Kenny RN  Outcome: Progressing  8/26/2024 0114 by Zoe Kenny RN  Outcome: Progressing     Problem: Safety - Adult  Goal: Free from fall injury  8/26/2024 0114 by Zoe Kenny RN  Outcome: Progressing  8/26/2024 0114 by Zoe Kenny RN  Outcome: Progressing     Problem: Discharge Planning  Goal: Discharge to home or other facility with appropriate resources  8/26/2024 0114 by Zoe Kenny RN  Outcome: Progressing  8/26/2024 0114 by Zoe Kenny RN  Outcome: Progressing     Problem: Chronic Conditions and Co-morbidities  Goal: Patient's chronic conditions and co-morbidity symptoms are monitored and maintained or improved  8/26/2024 0114 by Zoe Kenny RN  Outcome: Progressing  8/26/2024 0114 by Zoe Kenny RN  Outcome: Progressing     Problem: Diabetes  Goal: Achieve decreasing blood glucose levels by end of shift  8/26/2024 0114 by Zoe Kenny RN  Outcome: Progressing  8/26/2024 0114 by Zoe Kenny RN  Outcome: Progressing  Goal: Increase stability of blood glucose readings by end of shift  8/26/2024 0114 by Zoe Kenny RN  Outcome: Progressing  8/26/2024 0114 by Zoe Kenny RN  Outcome: Progressing  Goal: Decrease in ketones present in urine by end of shift  8/26/2024 0114 by Zoe Kenny RN  Outcome: Progressing  8/26/2024 0114 by Zoe Kenny RN  Outcome: Progressing  Goal: Maintain electrolyte levels within acceptable range throughout shift  8/26/2024 0114 by Zoe Kenny RN  Outcome: Progressing  8/26/2024 0114 by Zoe Kenny RN  Outcome: Progressing  Goal: Maintain glucose levels >70mg/dl to <250mg/dl throughout shift  8/26/2024 0114 by Zoe Kenny RN  Outcome:  Progressing  8/26/2024 0114 by Zoe Kenny RN  Outcome: Progressing  Goal: No changes in neurological exam by end of shift  8/26/2024 0114 by Zoe Kenny RN  Outcome: Progressing  8/26/2024 0114 by Zoe Kenny RN  Outcome: Progressing  Goal: Learn about and adhere to nutrition recommendations by end of shift  8/26/2024 0114 by Zoe Kenny RN  Outcome: Progressing  8/26/2024 0114 by Zoe Kenny RN  Outcome: Progressing  Goal: Vital signs within normal range for age by end of shift  8/26/2024 0114 by Zoe Kenny RN  Outcome: Progressing  8/26/2024 0114 by Zoe Kenny RN  Outcome: Progressing  Goal: Increase self care and/or family involovement by end of shift  8/26/2024 0114 by Zoe Kenny RN  Outcome: Progressing  8/26/2024 0114 by Zoe Kenny RN  Outcome: Progressing  Goal: Receive DSME education by end of shift  8/26/2024 0114 by Zoe Kenny RN  Outcome: Progressing  8/26/2024 0114 by Zoe Kenny RN  Outcome: Progressing     Problem: Heart Failure  Goal: Improved gas exchange this shift  8/26/2024 0114 by Zoe Kenny RN  Outcome: Progressing  8/26/2024 0114 by Zoe Kenny RN  Outcome: Progressing  Goal: Improved urinary output this shift  8/26/2024 0114 by Zoe Kenny RN  Outcome: Progressing  8/26/2024 0114 by Zoe Kenny RN  Outcome: Progressing  Goal: Reduction in peripheral edema within 24 hours  8/26/2024 0114 by Zoe Kenny RN  Outcome: Progressing  8/26/2024 0114 by Zoe Kenny RN  Outcome: Progressing  Goal: Report improvement of dyspnea/breathlessness this shift  8/26/2024 0114 by Zoe Kenny RN  Outcome: Progressing  8/26/2024 0114 by Zoe Kenny RN  Outcome: Progressing  Goal: Weight from fluid excess reduced over 2-3 days, then stabilize  8/26/2024 0114 by Zoe Kenny RN  Outcome: Progressing  8/26/2024 0114 by Zoe Kenny RN  Outcome: Progressing  Goal: Increase self care and/or family involvement in 24 hours  8/26/2024 0114 by Zoe Kenny, RN  Outcome:  Progressing  8/26/2024 0114 by Zoe Kenny, RN  Outcome: Progressing       The clinical goals for the shift include Maintain hemodynamic stability and rest

## 2024-08-26 NOTE — PROGRESS NOTES
Josefina Huerta is a 43 y.o. female on day 3 of admission presenting with Nausea and vomiting, unspecified vomiting type.      Subjective   Seen after returning from cath lab. States she's feeling well, denies any complaints. Denies SOB, CP, abd pain, nausea, vomiting, constipation, diarrhea.        Objective     Last Recorded Vitals  /85 (BP Location: Left arm, Patient Position: Lying)   Pulse 77   Temp 36.5 °C (97.7 °F) (Temporal)   Resp 18   Wt 143 kg (315 lb 4.1 oz)   SpO2 97%   Intake/Output last 3 Shifts:    Intake/Output Summary (Last 24 hours) at 8/26/2024 1246  Last data filed at 8/26/2024 1229  Gross per 24 hour   Intake 1300.8 ml   Output 2 ml   Net 1298.8 ml       Admission Weight  Weight: 129 kg (285 lb) (08/23/24 1354)    Daily Weight  08/26/24 : 143 kg (315 lb 4.1 oz)    Image Results  ECG 12 lead  Sinus tachycardia  Possible Left atrial enlargement  Left axis deviation  Anteroseptal infarct , age undetermined  T wave abnormality, consider inferolateral ischemia  Abnormal ECG  When compared with ECG of 26-NOV-2021 11:15,  Significant changes have occurred      Physical Exam  Constitutional:       General: She is not in acute distress.     Appearance: She is not toxic-appearing.   HENT:      Head: Normocephalic.      Mouth/Throat:      Pharynx: Oropharynx is clear.   Eyes:      General: No scleral icterus.  Cardiovascular:      Rate and Rhythm: Normal rate.   Pulmonary:      Effort: No respiratory distress.      Breath sounds: No wheezing.   Abdominal:      General: There is no distension.      Palpations: Abdomen is soft.   Musculoskeletal:      Right lower leg: No edema.      Left lower leg: No edema.   Neurological:      Mental Status: She is alert and oriented to person, place, and time.   Psychiatric:         Behavior: Behavior normal.         Relevant Results               Assessment/Plan        Assessment & Plan  Type 2 diabetes mellitus with ketoacidosis without coma, without  long-term current use of insulin (Multi)  On admission, , AG >19, LA 3.4, BHB 3.00, pH 7.42  HbA1c 9.7%, reportedly noncompliant with home metformin  S/p insulin gtt  Started on SSI   Metformin held  Consider adding SGLT2 inhibitor, will defer to cardiology  Hypoglycemia protocol  Given that HbA1c <10%, will plan to discharge patient on oral diabetic medications, she will need to follow up with her PCP for further management  Consider starting GLP1 as outpatient  Nausea and vomiting, unspecified vomiting type  2/2 above  Continue antiemetics PRN  Non-STEMI (non-ST elevated myocardial infarction) (Multi)  Troponin 297 ->282  Cardiology following  Planning for cardiac cath today  NPO for procedure  Acute systolic heart failure (Multi)  New diagnosis  Echo showing EF 30-35% with global hypokinesis and diastolic dysfunction concerning for possible stress-induced cardiomyopathy  Management per cardiology  Cardiac cath planned for today  Would likely benefit from starting SGLT2 inhibitor given hx of DM and now new CHF, defer to cardiology   GDMT per cardiology  Primary hypertension  Started on coreg   Home lisinopril discontinued, switched to losartan  Management per cardiology  Abnormal finding on urinalysis  UA + ketones, blood, glucose, protein, leukocyte esterase, and WBC  Urine culture pending  Continue empiric rocephin for now  Stage 2 chronic kidney disease  Baseline Cr ~1.0-1.2  Stable at baseline  Leukocytosis  Suspect due to dehydration/hemoconcentration vs reactive due to DKA and NSTEMI  Resolved    Plan:  S/p cardiac cath - did not require PCI  Continue SSI, adjust as needed - consider starting SGLT2 inhibitor, defer to cardiology  Continue rocephin empirically for UTI, follow up urine culture  Can hopefully be discharged in the next 24-48hrs pending cardiology final recommendations              Gladys Sy MD

## 2024-08-26 NOTE — HOSPITAL COURSE
43yoF hx of T2DM, HTN who presented with nausea, vomiting, and diarrhea. Labs consistent with mild DKA. Patient also had elevated troponins. Admitted to the ICU

## 2024-08-26 NOTE — POST-PROCEDURE NOTE
Physician Transition of Care Summary  Invasive Cardiovascular Lab    Procedure Date: 8/26/2024  Attending:    * Art Rice - Primary  Resident/Fellow/Other Assistant: Surgeons and Role:  * No surgeons found with a matching role *    Indications:   Pre-op Diagnosis      * Non-STEMI (non-ST elevated myocardial infarction) (Multi) [I21.4]     * Acute systolic heart failure (Multi) [I50.21]    Post-procedure diagnosis:   Post-op Diagnosis     * Non-STEMI (non-ST elevated myocardial infarction) (Multi) [I21.4]     * Acute systolic heart failure (Multi) [I50.21]    Procedure(s):   Left Heart Cath  08200 - PA CATH PLMT L HRT & ARTS W/NJX & ANGIO IMG S&I        Procedure Findings:   Mild nonobstructive coronary artery disease    Description of the Procedure:   The patient was brought to the Cath Lab in fasting state and prepped and draped in sterile fashion with particular attention to the right wrist.  A formal timeout was performed to ensure proper patient as well as site indication procedure.  1% lidocaine solution was used to anesthetize the area overlying the right radial artery just proximal to wrist.  A standard radial needle was used for access and ultimately upsized to a 6 [place]ian radial sheath.  An intra-arterial admixture of 5 mg verapamil and 200 mcg nitroglycerin was injected via the sheath.  We then proceed with the diagnostic portion exam advancing a 6 [place]ian JR 5 diagnostic catheter over a hydrophilic baby J-wire to the level of the coronary cusps.  The wire was withdrawn and the catheter was aspirated and flushed.  We engaged the catheter into the right coronary artery performed angiography and disengage.  We then remove this catheter and exchanged it in standard exchange fashion for a 6 [place]ian AL 3.5 diagnostic catheter.  The wire was withdrawn and the catheter was aspirated and flushed.  We then engaged the catheter into the left main performed angiography and disengaged.  We then exchanged this  catheter in the same fashion for a 6 [place] angled pigtail catheter which was prolapsed into the ventricle for left heart catheterization and pullback measurements.  The catheter was removed over a wire.  A TR band was placed with adequate hemostasis upon removal of the sheath.      LMCA-large-caliber vessel that bifurcates to the LAD left circumflex arteries.  It contains luminal irregularities.    LAD-large-caliber vessel that gives rise to 2 diagonal branches and a recurrent apical branch.  There are luminal irregularities.    Circumflex-large vessel nondominant giving rise to 2 obtuse marginals and a posterolateral branch.  There are luminal irregularities.    RCA-large dominant vessel giving rise to right posterior descending artery and posterolateral branches.  There are luminal irregularities.    LVEDP 12 m of mercury.    Ventriculogram: Global hypokinesis with basilar sparing, ejection fraction 30 to 35% trivial mitral regurgitation    Complications:   None     Stents/Implants:   Implants       No implant documentation for this case.            Anticoagulation/Antiplatelet Plan:   N/A    Estimated Blood Loss:   * No values recorded between 8/26/2024  8:58 AM and 8/26/2024  9:51 AM *    Anesthesia: Moderate Sedation Anesthesia Staff: No anesthesia staff entered.    Any Specimen(s) Removed:   No specimens collected during this procedure.    Disposition:   Stepdown      Electronically signed by: Art Rice DO, 8/26/2024 9:51 AM

## 2024-08-26 NOTE — DISCHARGE SUMMARY
Discharge Diagnosis  Nausea and vomiting, unspecified vomiting type    Issues Requiring Follow-Up  CHF, HTN - follow up with Dr. Rice in 1 week  T2DM - follow up with PCP in 1 week      Discharge Meds     Your medication list        START taking these medications        Instructions Last Dose Given Next Dose Due   carvedilol 6.25 mg tablet  Commonly known as: Coreg      Take 1 tablet (6.25 mg) by mouth 2 times a day.       dapagliflozin propanediol 10 mg  Commonly known as: Farxiga      Take 1 tablet (10 mg) by mouth once daily.       losartan 50 mg tablet  Commonly known as: Cozaar  Start taking on: August 27, 2024      Take 1 tablet (50 mg) by mouth once daily.              CONTINUE taking these medications        Instructions Last Dose Given Next Dose Due   atorvastatin 40 mg tablet  Commonly known as: Lipitor      Take 1 tablet (40 mg) by mouth once daily.       metFORMIN 1,000 mg tablet  Commonly known as: Glucophage      Take 1 tablet (1,000 mg) by mouth 2 times a day.              STOP taking these medications      lisinopril 40 mg tablet        metoprolol succinate XL 25 mg 24 hr tablet  Commonly known as: Toprol-XL                  Where to Get Your Medications        These medications were sent to [facility]  [address] [facility] [address]      Hours: 9 AM to 6 PM Mon-Fri, 9 AM to 1 PM Sat Phone: [phone]   atorvastatin 40 mg tablet  carvedilol 6.25 mg tablet  dapagliflozin propanediol 10 mg  losartan 50 mg tablet  metFORMIN 1,000 mg tablet         Test Results Pending At Discharge  Pending Labs       Order Current Status    Serial Troponin, 2 Hour ([facility]) Collected (08/23/24 1514)    Troponin T Series, High Sensitivity (0, 2HR, 6HR) In process    Urine Culture In process    Blood Culture Preliminary result    Blood Culture Preliminary result            Hospital Course  43yoF hx of T2DM, HTN who presented with nausea, vomiting, and diarrhea. Labs consistent with mild DKA.  Patient also had elevated troponins. Admitted to the ICU. S/p insulin gtt. Cardiology consulted. Echo showing EF 30-35% with global hypokinesis and diastolic dysfunction, concerning for possible stress-induced cardiomyopathy. S/p LHC 8/26/24 which showed mild nonobstructive coronary artery disease. Patient did not require PCI. Started on GDMT by cardiology. Cleared for discharge by cardiology. Patient will need to follow up with Dr. Rice and her PCP in 1 week for further management of HTN, CHF, and T2DM.     Pertinent Physical Exam At Time of Discharge  Physical Exam    Outpatient Follow-Up  No future appointments.    Time spent on discharge: 35 minutes    Gladys Sy MD

## 2024-08-26 NOTE — PROGRESS NOTES
08/26/24 0823   Discharge Planning   Expected Discharge Disposition Home     Per chart review pt independent from home no therapy ordered and last [facility] score 24. Patient has no needs from case management. Please consult if needs arise

## 2024-08-26 NOTE — ASSESSMENT & PLAN NOTE
UA + ketones, blood, glucose, protein, leukocyte esterase, and WBC  Urine culture pending  Continue empiric rocephin for now

## 2024-08-26 NOTE — NURSING NOTE
Nstemi information given to and reviewed with patient, risk factor modification addressed, CHF reviewed, pt receptive, daily weights , low sodium reinforced

## 2024-08-27 ENCOUNTER — PATIENT OUTREACH (OUTPATIENT)
Dept: PRIMARY CARE | Facility: CLINIC | Age: 44
End: 2024-08-27
Payer: COMMERCIAL

## 2024-08-27 LAB
BACTERIA BLD CULT: NORMAL
BACTERIA BLD CULT: NORMAL
BACTERIA UR CULT: NO GROWTH

## 2024-08-27 NOTE — PROGRESS NOTES
Discharge Facility: [facility]  Discharge Diagnosis: Nausea and Vomiting  Admission Date: 8/23/24  Discharge Date: 8/26/24    PCP Appointment Date: Unable to reach patient x2 attempts. Voicemail left with call back number.  Specialist Appointment Date: Unknown  Hospital Encounter and Summary Linked: Yes    Two attempts were made to reach patient within two business days after discharge. Voicemail left with contact information for patient to call back with any non-emergent questions or concerns.    Adalid Yuan LPN

## 2024-09-05 DIAGNOSIS — E11.10 TYPE 2 DIABETES MELLITUS WITH KETOACIDOSIS WITHOUT COMA, WITHOUT LONG-TERM CURRENT USE OF INSULIN (MULTI): Primary | ICD-10-CM

## 2024-09-05 DIAGNOSIS — I50.21 ACUTE SYSTOLIC HEART FAILURE (MULTI): ICD-10-CM

## 2024-09-09 ENCOUNTER — OFFICE VISIT (OUTPATIENT)
Dept: CARDIOLOGY | Facility: CLINIC | Age: 44
End: 2024-09-09
Payer: COMMERCIAL

## 2024-09-09 VITALS
OXYGEN SATURATION: 96 % | BODY MASS INDEX: 45.46 KG/M2 | WEIGHT: 293 LBS | DIASTOLIC BLOOD PRESSURE: 100 MMHG | HEART RATE: 87 BPM | SYSTOLIC BLOOD PRESSURE: 150 MMHG

## 2024-09-09 DIAGNOSIS — I50.21 ACUTE SYSTOLIC HEART FAILURE (MULTI): Primary | ICD-10-CM

## 2024-09-09 DIAGNOSIS — I51.81 TAKOTSUBO CARDIOMYOPATHY: ICD-10-CM

## 2024-09-09 DIAGNOSIS — I10 PRIMARY HYPERTENSION: ICD-10-CM

## 2024-09-09 PROCEDURE — 1036F TOBACCO NON-USER: CPT | Performed by: INTERNAL MEDICINE

## 2024-09-09 PROCEDURE — 4010F ACE/ARB THERAPY RXD/TAKEN: CPT | Performed by: INTERNAL MEDICINE

## 2024-09-09 PROCEDURE — 99214 OFFICE O/P EST MOD 30 MIN: CPT | Performed by: INTERNAL MEDICINE

## 2024-09-09 PROCEDURE — 3077F SYST BP >= 140 MM HG: CPT | Performed by: INTERNAL MEDICINE

## 2024-09-09 PROCEDURE — 3046F HEMOGLOBIN A1C LEVEL >9.0%: CPT | Performed by: INTERNAL MEDICINE

## 2024-09-09 PROCEDURE — 3080F DIAST BP >= 90 MM HG: CPT | Performed by: INTERNAL MEDICINE

## 2024-09-09 RX ORDER — SPIRONOLACTONE 25 MG/1
25 TABLET ORAL DAILY
Qty: 30 TABLET | Refills: 11 | Status: SHIPPED | OUTPATIENT
Start: 2024-09-09 | End: 2024-09-11 | Stop reason: SDUPTHER

## 2024-09-09 ASSESSMENT — ENCOUNTER SYMPTOMS
SHORTNESS OF BREATH: 0
IRREGULAR HEARTBEAT: 0
PALPITATIONS: 0
MYALGIAS: 0
ORTHOPNEA: 0
CLAUDICATION: 0
SYNCOPE: 0
WEAKNESS: 0
WEIGHT LOSS: 0
COUGH: 0
WEIGHT GAIN: 0
DIZZINESS: 0
FEVER: 0
DIAPHORESIS: 0
NEAR-SYNCOPE: 0
WHEEZING: 0
PND: 0
DYSPNEA ON EXERTION: 0

## 2024-09-09 ASSESSMENT — PAIN SCALES - GENERAL: PAINLEVEL: 0-NO PAIN

## 2024-09-09 NOTE — PROGRESS NOTES
Subjective      Chief Complaint   Patient presents with    Post-Cath        43-year-old female with history of diabetes mellitus hospitalized last month with nausea malaise fatigue.  She was found to DKA.  Her kidney function was normal throughout the stay.  High-sensitivity troponins drawn at admission were elevated in the 200s with a relatively insignificant trend.  She had an echocardiogram showing systolic dysfunction, she was catheterized to find mild nonobstructive disease.  Ventriculogram shows an EF of 30 to 35% and wall motion abnormalities very characteristic of Takotsubo's stress-induced nonischemic cardiomyopathy.  She was placed on guideline directed medical therapy.  She is here for follow-up. She feels well         Review of Systems   Constitutional: Negative for diaphoresis, fever, weight gain and weight loss.   Eyes:  Negative for visual disturbance.   Cardiovascular:  Negative for chest pain, claudication, dyspnea on exertion, irregular heartbeat, leg swelling, near-syncope, orthopnea, palpitations, paroxysmal nocturnal dyspnea and syncope.   Respiratory:  Negative for cough, shortness of breath and wheezing.    Musculoskeletal:  Negative for muscle weakness and myalgias.   Neurological:  Negative for dizziness and weakness.   All other systems reviewed and are negative.       Past Medical History:   Diagnosis Date    Diabetes mellitus (Multi)     TYPE @    Hypertension     Non-STEMI (non-ST elevated myocardial infarction) (Multi)         Past Surgical History:   Procedure Laterality Date    CARDIAC CATHETERIZATION N/A 8/26/2024    Procedure: Left Heart Cath;  Surgeon: Art Rice DO;  Location: [facility] Cardiac [facility];  Service: Cardiovascular;  Laterality: N/A;        Social History     Socioeconomic History    Marital status: Single     Spouse name: Not on file    Number of children: Not on file    Years of education: Not on file    Highest education level: Not on file   Occupational History     Not on file   Tobacco Use    Smoking status: Never    Smokeless tobacco: Never   Substance and Sexual Activity    Alcohol use: Yes     Comment: rare    Drug use: Never    Sexual activity: Not on file   Other Topics Concern    Not on file   Social History Narrative    Not on file     Social Determinants of Health     Financial Resource Strain: Patient Declined (8/23/2024)    Overall Financial Resource Strain (CARDIA)     Difficulty of Paying Living Expenses: Patient declined   Food Insecurity: Not on file   Transportation Needs: Patient Declined (8/23/2024)    PRAPARE - Transportation     Lack of Transportation (Medical): Patient declined     Lack of Transportation (Non-Medical): Patient declined   Physical Activity: Not on file   Stress: Not on file   Social Connections: Not on file   Intimate Partner Violence: Not on file   Housing Stability: Patient Declined (8/23/2024)    Housing Stability Vital Sign     Unable to Pay for Housing in the Last Year: Patient declined     Number of Times Moved in the Last Year: 1     Homeless in the Last Year: Patient declined        Family History   Problem Relation Name Age of Onset    Heart attack Father          OBJECTIVE:    Vitals:    09/09/24 1103   BP: (!) 150/100   Pulse: 87   SpO2: 96%        Vitals reviewed.   Constitutional:       Appearance: Normal and healthy appearance. Not in distress.   Pulmonary:      Effort: Pulmonary effort is normal.      Breath sounds: Normal breath sounds.   Cardiovascular:      Normal rate. Regular rhythm. Normal S1. Normal S2.       Murmurs: There is no murmur.      No gallop.  No click.   Pulses:     Intact distal pulses.   Edema:     Peripheral edema absent.   Skin:     General: Skin is warm and dry.   Neurological:      General: No focal deficit present.          Lab Review:   Lab Results   Component Value Date     08/26/2024    K 3.6 08/26/2024     08/26/2024    CO2 22 (L) 08/26/2024    BUN 17 08/26/2024    CREATININE 1.00  "08/26/2024    GLUCOSE 154 (H) 08/26/2024    CALCIUM 8.7 08/26/2024     Lab Results   Component Value Date    CHOL 254 (H) 02/11/2022    TRIG 282 (H) 02/11/2022    HDL 38.9 (A) 02/11/2022       No results found for: \"LDLCALC\"     Takotsubo cardiomyopathy  Will plan on repeating an echocardiogram in 2m. Phase 2 CR. Continue BB, ARB, SGLT2-Inh.     Acute systolic heart failure (Multi)  Euvolemic on exam today. No need for scheduled diuretic    Primary hypertension  Uncontrolled. Adding aldactone. Reassess in 2m       "

## 2024-09-11 ENCOUNTER — OFFICE VISIT (OUTPATIENT)
Dept: PRIMARY CARE | Facility: CLINIC | Age: 44
End: 2024-09-11
Payer: COMMERCIAL

## 2024-09-11 VITALS
DIASTOLIC BLOOD PRESSURE: 68 MMHG | HEART RATE: 80 BPM | HEIGHT: 68 IN | SYSTOLIC BLOOD PRESSURE: 140 MMHG | WEIGHT: 293 LBS | OXYGEN SATURATION: 100 % | TEMPERATURE: 95.6 F | BODY MASS INDEX: 44.41 KG/M2

## 2024-09-11 DIAGNOSIS — I50.21 ACUTE SYSTOLIC HEART FAILURE (MULTI): ICD-10-CM

## 2024-09-11 DIAGNOSIS — N18.2 STAGE 2 CHRONIC KIDNEY DISEASE: ICD-10-CM

## 2024-09-11 DIAGNOSIS — E11.10 TYPE 2 DIABETES MELLITUS WITH KETOACIDOSIS WITHOUT COMA, WITHOUT LONG-TERM CURRENT USE OF INSULIN (MULTI): Primary | ICD-10-CM

## 2024-09-11 DIAGNOSIS — I51.81 TAKOTSUBO CARDIOMYOPATHY: ICD-10-CM

## 2024-09-11 DIAGNOSIS — R10.819 LOWER ABDOMINAL TENDERNESS: ICD-10-CM

## 2024-09-11 DIAGNOSIS — E78.5 HYPERLIPIDEMIA, UNSPECIFIED: ICD-10-CM

## 2024-09-11 DIAGNOSIS — I10 PRIMARY HYPERTENSION: ICD-10-CM

## 2024-09-11 PROBLEM — I21.4 NON-STEMI (NON-ST ELEVATED MYOCARDIAL INFARCTION) (MULTI): Status: RESOLVED | Noted: 2024-08-23 | Resolved: 2024-09-11

## 2024-09-11 LAB
POC APPEARANCE, URINE: CLEAR
POC BILIRUBIN, URINE: ABNORMAL
POC BLOOD, URINE: ABNORMAL
POC COLOR, URINE: YELLOW
POC GLUCOSE, URINE: ABNORMAL MG/DL
POC KETONES, URINE: ABNORMAL MG/DL
POC LEUKOCYTES, URINE: NEGATIVE
POC NITRITE,URINE: NEGATIVE
POC PH, URINE: 5 PH
POC PROTEIN, URINE: ABNORMAL MG/DL
POC SPECIFIC GRAVITY, URINE: 1.02
POC UROBILINOGEN, URINE: 0.2 EU/DL

## 2024-09-11 PROCEDURE — 81003 URINALYSIS AUTO W/O SCOPE: CPT | Performed by: FAMILY MEDICINE

## 2024-09-11 PROCEDURE — 4010F ACE/ARB THERAPY RXD/TAKEN: CPT | Performed by: FAMILY MEDICINE

## 2024-09-11 PROCEDURE — 99214 OFFICE O/P EST MOD 30 MIN: CPT | Performed by: FAMILY MEDICINE

## 2024-09-11 PROCEDURE — 3008F BODY MASS INDEX DOCD: CPT | Performed by: FAMILY MEDICINE

## 2024-09-11 PROCEDURE — 3078F DIAST BP <80 MM HG: CPT | Performed by: FAMILY MEDICINE

## 2024-09-11 PROCEDURE — 1036F TOBACCO NON-USER: CPT | Performed by: FAMILY MEDICINE

## 2024-09-11 PROCEDURE — 3046F HEMOGLOBIN A1C LEVEL >9.0%: CPT | Performed by: FAMILY MEDICINE

## 2024-09-11 PROCEDURE — 3077F SYST BP >= 140 MM HG: CPT | Performed by: FAMILY MEDICINE

## 2024-09-11 RX ORDER — ATORVASTATIN CALCIUM 40 MG/1
40 TABLET, FILM COATED ORAL DAILY
Qty: 90 TABLET | Refills: 1 | Status: SHIPPED | OUTPATIENT
Start: 2024-09-11 | End: 2024-09-13 | Stop reason: SDUPTHER

## 2024-09-11 RX ORDER — DAPAGLIFLOZIN 10 MG/1
10 TABLET, FILM COATED ORAL DAILY
Qty: 90 TABLET | Refills: 1 | Status: SHIPPED | OUTPATIENT
Start: 2024-09-11 | End: 2024-09-13 | Stop reason: SDUPTHER

## 2024-09-11 RX ORDER — METFORMIN HYDROCHLORIDE 1000 MG/1
1000 TABLET ORAL 2 TIMES DAILY
Qty: 180 TABLET | Refills: 1 | Status: SHIPPED | OUTPATIENT
Start: 2024-09-11 | End: 2024-09-13 | Stop reason: SDUPTHER

## 2024-09-11 RX ORDER — LOSARTAN POTASSIUM 50 MG/1
50 TABLET ORAL DAILY
Qty: 90 TABLET | Refills: 1 | Status: SHIPPED | OUTPATIENT
Start: 2024-09-11 | End: 2024-09-13 | Stop reason: SDUPTHER

## 2024-09-11 RX ORDER — SPIRONOLACTONE 25 MG/1
25 TABLET ORAL DAILY
Qty: 90 TABLET | Refills: 1 | Status: SHIPPED | OUTPATIENT
Start: 2024-09-11 | End: 2024-09-13 | Stop reason: SDUPTHER

## 2024-09-11 RX ORDER — FLUCONAZOLE 150 MG/1
150 TABLET ORAL ONCE
Qty: 2 TABLET | Refills: 0 | Status: SHIPPED | OUTPATIENT
Start: 2024-09-11 | End: 2024-09-11

## 2024-09-11 RX ORDER — CARVEDILOL 6.25 MG/1
6.25 TABLET ORAL 2 TIMES DAILY
Qty: 180 TABLET | Refills: 1 | Status: SHIPPED | OUTPATIENT
Start: 2024-09-11 | End: 2024-09-13 | Stop reason: SDUPTHER

## 2024-09-11 ASSESSMENT — PATIENT HEALTH QUESTIONNAIRE - PHQ9
SUM OF ALL RESPONSES TO PHQ9 QUESTIONS 1 AND 2: 0
2. FEELING DOWN, DEPRESSED OR HOPELESS: NOT AT ALL
1. LITTLE INTEREST OR PLEASURE IN DOING THINGS: NOT AT ALL

## 2024-09-11 ASSESSMENT — PAIN SCALES - GENERAL: PAINLEVEL: 0-NO PAIN

## 2024-09-11 NOTE — PROGRESS NOTES
Outpatient Visit Note    Chief Complaint   Patient presents with    Hospital Follow-up         HPI:  Josefina Huerta is a 43 y.o. female who presents to the office for hospital follow-up.  Patient was last seen as new patient to [facility] care and for annual well exam and March 2023.      At time of last encounter she reported past medical history significant for diabetes and hypertension diagnosed in 2021.  Stated to have struggled with compliance with metformin regimen.  Panel of routine blood work was ordered though not completed.    Reviewed chart notes the patient presented to the [facility] emergency department on 8/23/2024 secondary to complaints of nausea with vomiting and diarrhea.  Patient stated symptoms started 2 days prior with diarrhea having resolved though she was unable to keep anything down secondary to intractable nausea.  She denies any fever, chills, night sweats, cough or congestion.  Patient was hypertensive and tachycardic on evaluation and was notably anxious.  Workup noted elevated lactic acid with leukocytosis of 22 2 weeks IV antibiotics were initiated and blood cultures will ordered.  CMP noted prominent hyperglycemia at 426 with GFR of 57 and prominently elevated troponins.  EKG showed no acute STEMI CT abdomen noted fatty liver with chest x-ray normal, though CT angio noted bilateral groundglass opacities consistent with probable reactive airway disease and enlarged thyroid gland with coarse calcifications.  She was ultimately started on insulin drip and admitted to ICU for further evaluation.  Confirmed that she had been off of her metformin regimen for several months after insurance changed.  Cardiology was consulted.    Labs were ultimately consistent with mild DKA and elevated troponins with echocardiogram completed showing EF of 30 to 55% and concerns for possible stress-induced cardiomyopathy.  Patient underwent cardiac cath on 8/26/2024 which showed mild nonobstructive  coronary artery disease.  Stenting was not required.  She was cleared by cardiology for outpatient follow-up for management of CHF, hypertension and type 2 diabetes.  Was initiated on regimen of atorvastatin 40 mg daily carvedilol 6.5 mg twice daily losartan 50 mg, metformin 1000 twice a day and Farxiga 10 mg daily.  Of note, TSH was completed in the hospital which was within normal range.  A1c showed poor control at 9.7%.    She had hospital/post cath follow-up on 9/9/2024 with Dr. Rice.  At that time she stated to be feeling well.  Blood pressure continued elevated at 150/100.  Plan was sent for repeat echocardiogram in 2 months with Aldactone added for further blood pressure control.  Stated to be feeling better each day since discharge from hospital.    She reports intermittent lower abdominal pelvic pressure with concerns for possible UTI.  Denies overt urinary frequency/urgency.  Denies any polyuria, polydipsia, polyphagia or acute vision/sensation changes.    Current Medications  Current Outpatient Medications   Medication Instructions    atorvastatin (LIPITOR) 40 mg, oral, Daily    carvedilol (COREG) 6.25 mg, oral, 2 times daily    dapagliflozin propanediol (FARXIGA) 10 mg, oral, Daily    fluconazole (DIFLUCAN) 150 mg, oral, Once, May take additionally 150mg dose after 72 hours if symptoms persist    losartan (COZAAR) 50 mg, oral, Daily    metFORMIN (GLUCOPHAGE) 1,000 mg, oral, 2 times daily    spironolactone (ALDACTONE) 25 mg, oral, Daily        Allergies  No Known Allergies     Past Medical History:   Diagnosis Date    Diabetes mellitus (Multi)     TYPE @    Hypertension     Non-STEMI (non-ST elevated myocardial infarction) (Multi)       Past Surgical History:   Procedure Laterality Date    CARDIAC CATHETERIZATION N/A 8/26/2024    Procedure: Left Heart Cath;  Surgeon: Art Rice DO;  Location: [facility];  Service: Cardiovascular;  Laterality: N/A;     Family History   Problem Relation  Name Age of Onset    Heart attack Father       Social History     Tobacco Use    Smoking status: Never    Smokeless tobacco: Never   Vaping Use    Vaping status: Never Used   Substance Use Topics    Alcohol use: Yes     Comment: rare    Drug use: Never       ROS  All pertinent positive symptoms are included in the history of present illness.  All other systems have been reviewed and are negative and noncontributory to this patient's current ailments.    VITAL SIGNS  Vitals:    09/11/24 0901   BP: 140/68   Pulse: 80   Temp: 35.3 °C (95.6 °F)   SpO2: 100%       PHYSICAL EXAM  GENERAL APPEARANCE: alert and oriented, Pleasant and cooperative, No Acute Distress  HEENT: EOMI, PERRLA, MMM  HEART: RRR, normal S1S2, no murmurs, click or rubs  LUNGS: clear to auscultation bilaterally, no wheezes/rhonchi/rales  EXTREMITIES: no edema, normal ROM  SKIN: normal, no rash, unremarkable  NEUROLOGIC EXAM: non-focal exam  MUSCULOSKELETAL: no gross abnormalities  PSYCH: affect is normal, eye contact is good      Assessment/Plan   Problem List Items Addressed This Visit             ICD-10-CM    Acute systolic heart failure (Multi) I50.21     - Stable on current regimen  -Continue to focus medication compliance and cardiac follow-up as scheduled         Relevant Medications    carvedilol (Coreg) 6.25 mg tablet    dapagliflozin propanediol (Farxiga) 10 mg    losartan (Cozaar) 50 mg tablet    Type 2 diabetes mellitus with ketoacidosis without coma, without long-term current use of insulin (Multi) - Primary E11.10     - A1c sugar average in hospital showed sugars above goal at 9.7%  -Will focus on compliance with metformin twice daily and Farxiga along with healthy, low-fat diet with moderation carbohydrates/processed sugars  -Plan will be for 3-month follow-up to recheck A1c and monitor progress  -Annual diabetic eye exam educated         Relevant Medications    metFORMIN (Glucophage) 1,000 mg tablet    Primary hypertension I10     - Blood  pressure stable in office today  -Continue on current regimen  -Advocated focus on healthy, balanced diet with moderation of salt/caffeine         Relevant Medications    dapagliflozin propanediol (Farxiga) 10 mg    losartan (Cozaar) 50 mg tablet    spironolactone (Aldactone) 25 mg tablet    Stage 2 chronic kidney disease N18.2     - Kidney function stable during hospitalization to which we will monitor with routine blood work at follow-up  -Good daily hydration advocated         Takotsubo cardiomyopathy I51.81     - Continue with established cardiology follow-up as scheduled along with plans for repeat echocardiogram   -advocate compliance with medication regimen           Relevant Medications    carvedilol (Coreg) 6.25 mg tablet    Lower abdominal tenderness R10.819     - Urinalysis in office showed no signs of infection though did show excess sugars consistent with diabetes and use of Farxiga  -Symptoms may represent early onset of a fungal/yeast infection to which I have given prescription for as needed fluconazole  -Please let me know if symptoms persist/progress          Relevant Medications    fluconazole (Diflucan) 150 mg tablet    Other Relevant Orders    POCT UA Automated manually resulted (Completed)     Other Visit Diagnoses         Codes    Hyperlipidemia, unspecified     E78.5    Relevant Medications    atorvastatin (Lipitor) 40 mg tablet            Counseling:       Medication education:         Education:  The patient is counseled regarding potential side-effects of all new medications        Understanding:  Patient expressed understanding        Adherence:  No barriers to adherence identified    ** Please excuse any errors in grammar or translation related to this dictation. Voice recognition software was utilized to prepare this document. **

## 2024-09-11 NOTE — ASSESSMENT & PLAN NOTE
- Urinalysis in office showed no signs of infection though did show excess sugars consistent with diabetes and use of Farxiga  -Symptoms may represent early onset of a fungal/yeast infection to which I have given prescription for as needed fluconazole  -Please let me know if symptoms persist/progress

## 2024-09-11 NOTE — PATIENT INSTRUCTIONS
Problem List Items Addressed This Visit             ICD-10-CM    Acute systolic heart failure (Multi) I50.21     - Stable on current regimen  -Continue to focus medication compliance and cardiac follow-up as scheduled         Relevant Medications    carvedilol (Coreg) 6.25 mg tablet    dapagliflozin propanediol (Farxiga) 10 mg    losartan (Cozaar) 50 mg tablet    Type 2 diabetes mellitus with ketoacidosis without coma, without long-term current use of insulin (Multi) - Primary E11.10     - A1c sugar average in hospital showed sugars above goal at 9.7%  -Will focus on compliance with metformin twice daily and Farxiga along with healthy, low-fat diet with moderation carbohydrates/processed sugars  -Plan will be for 3-month follow-up to recheck A1c and monitor progress  -Annual diabetic eye exam educated         Relevant Medications    metFORMIN (Glucophage) 1,000 mg tablet    Primary hypertension I10     - Blood pressure stable in office today  -Continue on current regimen  -Advocated focus on healthy, balanced diet with moderation of salt/caffeine         Relevant Medications    dapagliflozin propanediol (Farxiga) 10 mg    losartan (Cozaar) 50 mg tablet    spironolactone (Aldactone) 25 mg tablet    Stage 2 chronic kidney disease N18.2     - Kidney function stable during hospitalization to which we will monitor with routine blood work at follow-up  -Good daily hydration advocated         Takotsubo cardiomyopathy I51.81     - Continue with established cardiology follow-up as scheduled along with plans for repeat echocardiogram   -advocate compliance with medication regimen           Relevant Medications    carvedilol (Coreg) 6.25 mg tablet    Lower abdominal tenderness R10.819     - Urinalysis in office showed no signs of infection though did show excess sugars consistent with diabetes and use of Farxiga  -Symptoms may represent early onset of a fungal/yeast infection to which I have given prescription for as needed  fluconazole  -Please let me know if symptoms persist/progress          Relevant Medications    fluconazole (Diflucan) 150 mg tablet    Other Relevant Orders    POCT UA Automated manually resulted (Completed)     Other Visit Diagnoses         Codes    Hyperlipidemia, unspecified     E78.5    Relevant Medications    atorvastatin (Lipitor) 40 mg tablet            Counseling:       Medication education:         Education:  The patient is counseled regarding potential side-effects of all new medications        Understanding:  Patient expressed understanding        Adherence:  No barriers to adherence identified    ** Please excuse any errors in grammar or translation related to this dictation. Voice recognition software was utilized to prepare this document. **

## 2024-09-11 NOTE — ASSESSMENT & PLAN NOTE
- Stable on current regimen  -Continue to focus medication compliance and cardiac follow-up as scheduled

## 2024-09-11 NOTE — ASSESSMENT & PLAN NOTE
- Blood pressure stable in office today  -Continue on current regimen  -Advocated focus on healthy, balanced diet with moderation of salt/caffeine

## 2024-09-11 NOTE — ASSESSMENT & PLAN NOTE
- A1c sugar average in hospital showed sugars above goal at 9.7%  -Will focus on compliance with metformin twice daily and Farxiga along with healthy, low-fat diet with moderation carbohydrates/processed sugars  -Plan will be for 3-month follow-up to recheck A1c and monitor progress  -Annual diabetic eye exam educated

## 2024-09-11 NOTE — ASSESSMENT & PLAN NOTE
- Continue with established cardiology follow-up as scheduled along with plans for repeat echocardiogram   -advocate compliance with medication regimen

## 2024-09-11 NOTE — ASSESSMENT & PLAN NOTE
- Kidney function stable during hospitalization to which we will monitor with routine blood work at follow-up  -Good daily hydration advocated

## 2024-09-12 ENCOUNTER — PATIENT OUTREACH (OUTPATIENT)
Dept: PRIMARY CARE | Facility: CLINIC | Age: 44
End: 2024-09-12
Payer: COMMERCIAL

## 2024-09-12 NOTE — PROGRESS NOTES
Unable to reach patient for call back after patient's follow up appointment with PCP.   LVM with call back number for patient to call if needed   If no voicemail available call attempts x 2 were made to contact the patient to assist with any questions or concerns patient may have.    Adalid Yuan LPN

## 2024-09-13 ENCOUNTER — TELEPHONE (OUTPATIENT)
Dept: PRIMARY CARE | Facility: CLINIC | Age: 44
End: 2024-09-13

## 2024-09-13 DIAGNOSIS — E78.5 HYPERLIPIDEMIA, UNSPECIFIED: ICD-10-CM

## 2024-09-13 DIAGNOSIS — I10 PRIMARY HYPERTENSION: ICD-10-CM

## 2024-09-13 DIAGNOSIS — E11.10 TYPE 2 DIABETES MELLITUS WITH KETOACIDOSIS WITHOUT COMA, WITHOUT LONG-TERM CURRENT USE OF INSULIN: ICD-10-CM

## 2024-09-13 DIAGNOSIS — I50.21 ACUTE SYSTOLIC HEART FAILURE: ICD-10-CM

## 2024-09-13 DIAGNOSIS — I50.22 HEART FAILURE, SYSTOLIC, CHRONIC: Primary | ICD-10-CM

## 2024-09-13 RX ORDER — CARVEDILOL 6.25 MG/1
6.25 TABLET ORAL 2 TIMES DAILY
Qty: 180 TABLET | Refills: 1 | Status: SHIPPED | OUTPATIENT
Start: 2024-09-13 | End: 2025-03-12

## 2024-09-13 RX ORDER — LOSARTAN POTASSIUM 50 MG/1
50 TABLET ORAL DAILY
Qty: 90 TABLET | Refills: 1 | Status: SHIPPED | OUTPATIENT
Start: 2024-09-13 | End: 2025-03-12

## 2024-09-13 RX ORDER — SPIRONOLACTONE 25 MG/1
25 TABLET ORAL DAILY
Qty: 90 TABLET | Refills: 1 | Status: SHIPPED | OUTPATIENT
Start: 2024-09-13 | End: 2025-03-12

## 2024-09-13 RX ORDER — DAPAGLIFLOZIN 10 MG/1
10 TABLET, FILM COATED ORAL DAILY
Qty: 90 TABLET | Refills: 1 | Status: SHIPPED | OUTPATIENT
Start: 2024-09-13 | End: 2025-03-12

## 2024-09-13 RX ORDER — ATORVASTATIN CALCIUM 40 MG/1
40 TABLET, FILM COATED ORAL DAILY
Qty: 90 TABLET | Refills: 1 | Status: SHIPPED | OUTPATIENT
Start: 2024-09-13 | End: 2025-03-12

## 2024-09-13 RX ORDER — METFORMIN HYDROCHLORIDE 1000 MG/1
1000 TABLET ORAL 2 TIMES DAILY
Qty: 180 TABLET | Refills: 1 | Status: SHIPPED | OUTPATIENT
Start: 2024-09-13 | End: 2025-03-12

## 2024-09-13 NOTE — TELEPHONE ENCOUNTER
Would like the prescriptions that were sent to [facility] on 9/11/2024 to be moved to [facility].  (Artorvastatin, carvedilol, dapagliflozine, losartan, metformin, spironolactone) Cost is less expensive.  Please call when moved.    Also, noticed that after taking her first dose of spironolactone on 9/11 that her rt lower leg (calf) started to swell up & feel achy.   Please call & advise.  Thank you.

## 2024-09-13 NOTE — TELEPHONE ENCOUNTER
Prescription sent to [facility] per request.  May be experiencing muscle cramping from spironolactone as this can create some loss of potassium.  Would encourage patient to increase high potassium foods such as bananas/citrus's.  If symptoms persist, would recommend being in contact with Dr. Rice to see if he would like to alter regimen

## 2024-09-16 ENCOUNTER — TELEPHONE (OUTPATIENT)
Dept: CARDIOLOGY | Facility: CLINIC | Age: 44
End: 2024-09-16
Payer: COMMERCIAL

## 2024-09-16 NOTE — TELEPHONE ENCOUNTER
Patient called the office stating that since starting spironolactone her right leg has been swelling, her skin on her hands is very dry and patchy.  She is asking for advice, thanks  Call back [phone]

## 2024-09-17 ENCOUNTER — HOSPITAL ENCOUNTER (EMERGENCY)
Facility: HOSPITAL | Age: 44
Discharge: HOME | End: 2024-09-17
Attending: STUDENT IN AN ORGANIZED HEALTH CARE EDUCATION/TRAINING PROGRAM
Payer: COMMERCIAL

## 2024-09-17 ENCOUNTER — APPOINTMENT (OUTPATIENT)
Dept: RADIOLOGY | Facility: HOSPITAL | Age: 44
End: 2024-09-17
Payer: COMMERCIAL

## 2024-09-17 VITALS
OXYGEN SATURATION: 98 % | SYSTOLIC BLOOD PRESSURE: 252 MMHG | HEIGHT: 68 IN | RESPIRATION RATE: 16 BRPM | DIASTOLIC BLOOD PRESSURE: 133 MMHG | WEIGHT: 293 LBS | TEMPERATURE: 98.6 F | BODY MASS INDEX: 44.41 KG/M2 | HEART RATE: 92 BPM

## 2024-09-17 DIAGNOSIS — R03.0 ELEVATED BLOOD PRESSURE READING: Primary | ICD-10-CM

## 2024-09-17 DIAGNOSIS — I82.411 ACUTE DEEP VEIN THROMBOSIS (DVT) OF FEMORAL VEIN OF RIGHT LOWER EXTREMITY (MULTI): ICD-10-CM

## 2024-09-17 PROCEDURE — 2500000001 HC RX 250 WO HCPCS SELF ADMINISTERED DRUGS (ALT 637 FOR MEDICARE OP): Performed by: STUDENT IN AN ORGANIZED HEALTH CARE EDUCATION/TRAINING PROGRAM

## 2024-09-17 PROCEDURE — 93971 EXTREMITY STUDY: CPT

## 2024-09-17 PROCEDURE — 93971 EXTREMITY STUDY: CPT | Performed by: RADIOLOGY

## 2024-09-17 PROCEDURE — 99284 EMERGENCY DEPT VISIT MOD MDM: CPT | Mod: 25

## 2024-09-17 PROCEDURE — 2500000005 HC RX 250 GENERAL PHARMACY W/O HCPCS: Performed by: STUDENT IN AN ORGANIZED HEALTH CARE EDUCATION/TRAINING PROGRAM

## 2024-09-17 RX ORDER — CARVEDILOL 6.25 MG/1
6.25 TABLET ORAL ONCE
Status: COMPLETED | OUTPATIENT
Start: 2024-09-17 | End: 2024-09-17

## 2024-09-17 RX ORDER — ONDANSETRON 4 MG/1
4 TABLET, ORALLY DISINTEGRATING ORAL ONCE
Status: COMPLETED | OUTPATIENT
Start: 2024-09-17 | End: 2024-09-17

## 2024-09-17 RX ORDER — FAMOTIDINE 20 MG/1
20 TABLET, FILM COATED ORAL ONCE
Status: COMPLETED | OUTPATIENT
Start: 2024-09-17 | End: 2024-09-17

## 2024-09-17 ASSESSMENT — PAIN DESCRIPTION - FREQUENCY: FREQUENCY: CONSTANT/CONTINUOUS

## 2024-09-17 ASSESSMENT — LIFESTYLE VARIABLES
EVER FELT BAD OR GUILTY ABOUT YOUR DRINKING: NO
HAVE PEOPLE ANNOYED YOU BY CRITICIZING YOUR DRINKING: NO
TOTAL SCORE: 0
EVER HAD A DRINK FIRST THING IN THE MORNING TO STEADY YOUR NERVES TO GET RID OF A HANGOVER: NO
HAVE YOU EVER FELT YOU SHOULD CUT DOWN ON YOUR DRINKING: NO

## 2024-09-17 ASSESSMENT — PAIN - FUNCTIONAL ASSESSMENT
PAIN_FUNCTIONAL_ASSESSMENT: 0-10
PAIN_FUNCTIONAL_ASSESSMENT: 0-10

## 2024-09-17 ASSESSMENT — PAIN SCALES - GENERAL
PAINLEVEL_OUTOF10: 6
PAINLEVEL_OUTOF10: 0 - NO PAIN

## 2024-09-17 ASSESSMENT — PAIN DESCRIPTION - LOCATION: LOCATION: LEG

## 2024-09-17 ASSESSMENT — COLUMBIA-SUICIDE SEVERITY RATING SCALE - C-SSRS
6. HAVE YOU EVER DONE ANYTHING, STARTED TO DO ANYTHING, OR PREPARED TO DO ANYTHING TO END YOUR LIFE?: NO
1. IN THE PAST MONTH, HAVE YOU WISHED YOU WERE DEAD OR WISHED YOU COULD GO TO SLEEP AND NOT WAKE UP?: NO
2. HAVE YOU ACTUALLY HAD ANY THOUGHTS OF KILLING YOURSELF?: NO

## 2024-09-17 ASSESSMENT — PAIN DESCRIPTION - ORIENTATION: ORIENTATION: RIGHT

## 2024-09-17 ASSESSMENT — PAIN DESCRIPTION - ONSET: ONSET: AWAKENED FROM SLEEP

## 2024-09-17 ASSESSMENT — PAIN DESCRIPTION - DESCRIPTORS: DESCRIPTORS: PRESSURE

## 2024-09-17 ASSESSMENT — PAIN DESCRIPTION - PROGRESSION: CLINICAL_PROGRESSION: NOT CHANGED

## 2024-09-17 ASSESSMENT — PAIN DESCRIPTION - PAIN TYPE: TYPE: ACUTE PAIN

## 2024-09-17 NOTE — PROGRESS NOTES
Medication Education     Medication education for Josefina Huerta was provided to the patient  for the following medication(s):    Eliquis (apixaban)    Medication education provided by a Pharmacist:  ADR Counseling Dose, frequency, storage How to take and what to do if a dose is missed How the medication works and benefits of taking it Importance of compliance Any drug interactions (including OTCs and herbvals) and importance of notifying a healthcare provider of any medication changes    Identified potential barriers to education:  None    Method(s) of Education:  Verbal Written materials provided and reviewed    An opportunity to ask questions and receive answers was provided.     Assessment of understanding the patient :  2= meets goals/outcomes    Additional Notes (if applicable): provided discount card and pill box    Angela Ventura, PharmD

## 2024-09-17 NOTE — ED PROVIDER NOTES
HPI   Chief Complaint   Patient presents with    Leg Swelling     Pt to ed for right leg swelling. Pt sts swelling started on Wednesday night. No history of blood clots and no blood thinner.        43-year-old female with a past medical history of type 2 diabetes with NSTEMI, heart failure with reduced ejection fraction, hypertension, DKA presented the Emergency Department for evaluation of 1 week of right lower extremity swelling and firmness.  Denies any recent long travel, immobilization other than when she was laid up in bed during the recent hospitalization.  She denies any prior history of thrombosis of any kind.  She denies any chest pain, shortness of breath, use of estrogen products.  Denies any falls or trauma.  She denies any injury to the area.  She denies any pain with range of motion of the knee or ankle.              Patient History   Past Medical History:   Diagnosis Date    Diabetes mellitus (Multi)     TYPE @    Hypertension     Non-STEMI (non-ST elevated myocardial infarction) (Multi)      Past Surgical History:   Procedure Laterality Date    CARDIAC CATHETERIZATION N/A 8/26/2024    Procedure: Left Heart Cath;  Surgeon: Art Rice DO;  Location: [facility] Cardiac Cath Lab;  Service: Cardiovascular;  Laterality: N/A;     Family History   Problem Relation Name Age of Onset    Heart attack Father       Social History     Tobacco Use    Smoking status: Never    Smokeless tobacco: Never   Vaping Use    Vaping status: Never Used   Substance Use Topics    Alcohol use: Yes     Comment: rare    Drug use: Never       Physical Exam   ED Triage Vitals [09/17/24 1408]   Temperature Heart Rate Respirations BP   37 °C (98.6 °F) 99 20 (!) 202/106      Pulse Ox Temp Source Heart Rate Source Patient Position   100 % Temporal Monitor Sitting      BP Location FiO2 (%)     Left arm --       Physical Exam  Vitals and nursing note reviewed.   Constitutional:       General: She is not in acute distress.     Appearance:  She is well-developed. She is not ill-appearing.   HENT:      Head: Normocephalic and atraumatic.      Nose: No congestion or rhinorrhea.      Mouth/Throat:      Mouth: Mucous membranes are moist.      Pharynx: No oropharyngeal exudate or posterior oropharyngeal erythema.   Eyes:      Conjunctiva/sclera: Conjunctivae normal.   Cardiovascular:      Rate and Rhythm: Normal rate and regular rhythm.      Pulses: Normal pulses.      Heart sounds: No murmur heard.     No gallop.   Pulmonary:      Effort: Pulmonary effort is normal. No respiratory distress.      Breath sounds: Normal breath sounds. No stridor. No wheezing, rhonchi or rales.   Abdominal:      General: Bowel sounds are normal. There is no distension.      Palpations: Abdomen is soft.      Tenderness: There is no abdominal tenderness. There is no guarding or rebound.   Musculoskeletal:         General: Swelling and tenderness (Right calf) present.      Cervical back: Neck supple.      Comments: No bony tenderness with full range of motion of the bilateral lower extremities.  2+ her dorsalis pedis posterior tibial pulses.  Cap refill less than 2 seconds in the bilateral lower extremities.   Skin:     General: Skin is warm and dry.      Capillary Refill: Capillary refill takes less than 2 seconds.      Findings: No rash.      Comments: No erythema or warmth   Neurological:      General: No focal deficit present.      Mental Status: She is alert and oriented to person, place, and time.      Cranial Nerves: No cranial nerve deficit.      Sensory: No sensory deficit.      Gait: Gait normal.   Psychiatric:         Mood and Affect: Mood normal.         Behavior: Behavior normal.         Thought Content: Thought content normal.           ED Course & [facility]   ED Course as of 09/17/24 1703   Tue Sep 17, 2024   1606 Patient reporting heartburn and nausea. Pepcid and zofran ODT ordered. [TL]   1614 Patient noted to have elevated blood pressure. Denies symptoms at this time  to suggest hypertensive emergency. Recommend continued continued discussion with outpatient team regarding management of this. [TL]   1625 Home coreg ordered. Duplex positive for DVT. Will start on eliquis. Recent kidney function reviewed and WNL.  She was made aware of the need to follow-up with the outpatient team is also the bleeding risk and should she have any trauma or bleeding she is to come back to the emergency department for reevaluation. [TL]      ED Course User Index  [TL] Juan Alberto Edge DO         Diagnoses as of 09/17/24 1703   Elevated blood pressure reading   Acute deep vein thrombosis (DVT) of femoral vein of right lower extremity (Multi)                 No data recorded     [place] Coma Scale Score: 15 (09/17/24 1409 : Jolene Jose RN)                           Medical Decision Making  43-year-old female presenting with right calf tenderness and swelling.  No sign of erythema or warmth to suggest cellulitis.  I am concerned for possible DVT will obtain duplex at this time.  Could be that this was provoked by recent hospitalization with immobilization at that time.  Patient with no known clotting disorder or previous history of venous thrombosis.  She denies any chest pain or shortness of breath with no tachycardia or hypoxia.  Patient is hypertensive here in the Emergency Department but lungs are clear with no sign of pulmonary edema otherwise.  I do not suspect this is a CHF exacerbation.  She otherwise feels and looks well.  Prior kidney function in the past and do not believe this need to be repeated at this time as patient is reporting normal intake of fluids and urine output.        Procedure  Procedures     Juan Alberto Edge DO  09/17/24 1703

## 2024-09-17 NOTE — ED NOTES
Pt to ed for right leg swelling. Pt sts swelling started on Wednesday night. No history of blood clots and no blood thinner.     PMHX:  Past Medical History:   Diagnosis Date    Diabetes mellitus (Multi)     TYPE @    Hypertension     Non-STEMI (non-ST elevated myocardial infarction) (Multi)         No Known Allergies      LABS:   09/17/24 16:03   VASC US LOWER EXTREMITY VENOUS DUPLEX RIGHT Rpt   Rpt: View report in Results Review for more information    PLAN: discharge.                  Jamee Pal RN  09/17/24 5101

## 2024-09-17 NOTE — DISCHARGE INSTRUCTIONS
Please take your blood thinner as prescribed for newly diagnosed right leg deep vein thrombosis.  She develop any shortness of breath or chest pain please come back to emergency department.  Please follow-up with your primary care physician regarding your elevated blood pressure.  Being on a blood thinner does make you high risk for bleeding and should you have any sign of bleeding or significant trauma please come back to the Emergency Department as this is high risk for you at this time.  Otherwise please follow-up with your PCP in the next 3 days.

## 2024-09-18 ENCOUNTER — APPOINTMENT (OUTPATIENT)
Dept: CARDIOLOGY | Facility: HOSPITAL | Age: 44
End: 2024-09-18
Payer: COMMERCIAL

## 2024-09-18 ENCOUNTER — APPOINTMENT (OUTPATIENT)
Dept: RADIOLOGY | Facility: HOSPITAL | Age: 44
End: 2024-09-18
Payer: COMMERCIAL

## 2024-09-18 ENCOUNTER — PHARMACY VISIT (OUTPATIENT)
Dept: PHARMACY | Facility: CLINIC | Age: 44
End: 2024-09-18
Payer: COMMERCIAL

## 2024-09-18 ENCOUNTER — HOSPITAL ENCOUNTER (EMERGENCY)
Facility: HOSPITAL | Age: 44
Discharge: HOME | End: 2024-09-18
Attending: STUDENT IN AN ORGANIZED HEALTH CARE EDUCATION/TRAINING PROGRAM
Payer: COMMERCIAL

## 2024-09-18 VITALS
HEIGHT: 68 IN | TEMPERATURE: 97.7 F | OXYGEN SATURATION: 98 % | RESPIRATION RATE: 18 BRPM | WEIGHT: 293 LBS | BODY MASS INDEX: 44.41 KG/M2 | HEART RATE: 95 BPM | DIASTOLIC BLOOD PRESSURE: 84 MMHG | SYSTOLIC BLOOD PRESSURE: 152 MMHG

## 2024-09-18 DIAGNOSIS — I26.99 ACUTE PULMONARY EMBOLISM, UNSPECIFIED PULMONARY EMBOLISM TYPE, UNSPECIFIED WHETHER ACUTE COR PULMONALE PRESENT (MULTI): ICD-10-CM

## 2024-09-18 DIAGNOSIS — R10.9 ABDOMINAL PAIN, UNSPECIFIED ABDOMINAL LOCATION: ICD-10-CM

## 2024-09-18 DIAGNOSIS — R11.0 NAUSEA: Primary | ICD-10-CM

## 2024-09-18 DIAGNOSIS — R00.0 TACHYCARDIA: ICD-10-CM

## 2024-09-18 LAB
ALBUMIN SERPL BCP-MCNC: 4.2 G/DL (ref 3.4–5)
ALP SERPL-CCNC: 75 U/L (ref 33–110)
ALT SERPL W P-5'-P-CCNC: 19 U/L (ref 7–45)
ANION GAP SERPL CALCULATED.3IONS-SCNC: 24 MMOL/L (ref 10–20)
AST SERPL W P-5'-P-CCNC: 19 U/L (ref 9–39)
ATRIAL RATE: 112 BPM
BASOPHILS # BLD AUTO: 0.11 X10*3/UL (ref 0–0.1)
BASOPHILS NFR BLD AUTO: 0.9 %
BILIRUB SERPL-MCNC: 0.6 MG/DL (ref 0–1.2)
BNP SERPL-MCNC: 1117 PG/ML (ref 0–99)
BUN SERPL-MCNC: 18 MG/DL (ref 6–23)
CALCIUM SERPL-MCNC: 10.1 MG/DL (ref 8.6–10.3)
CARDIAC TROPONIN I PNL SERPL HS: 53 NG/L (ref 0–13)
CARDIAC TROPONIN I PNL SERPL HS: 58 NG/L (ref 0–13)
CHLORIDE SERPL-SCNC: 100 MMOL/L (ref 98–107)
CO2 SERPL-SCNC: 15 MMOL/L (ref 21–32)
CREAT SERPL-MCNC: 1.24 MG/DL (ref 0.5–1.05)
EGFRCR SERPLBLD CKD-EPI 2021: 55 ML/MIN/1.73M*2
EOSINOPHIL # BLD AUTO: 0.06 X10*3/UL (ref 0–0.7)
EOSINOPHIL NFR BLD AUTO: 0.5 %
ERYTHROCYTE [DISTWIDTH] IN BLOOD BY AUTOMATED COUNT: 14.7 % (ref 11.5–14.5)
GIANT PLATELETS BLD QL SMEAR: NORMAL
GLUCOSE BLD MANUAL STRIP-MCNC: 166 MG/DL (ref 74–99)
GLUCOSE SERPL-MCNC: 158 MG/DL (ref 74–99)
HCT VFR BLD AUTO: 43.8 % (ref 36–46)
HGB BLD-MCNC: 14.1 G/DL (ref 12–16)
IMM GRANULOCYTES # BLD AUTO: 0.06 X10*3/UL (ref 0–0.7)
IMM GRANULOCYTES NFR BLD AUTO: 0.5 % (ref 0–0.9)
LIPASE SERPL-CCNC: 23 U/L (ref 9–82)
LYMPHOCYTES # BLD AUTO: 1.3 X10*3/UL (ref 1.2–4.8)
LYMPHOCYTES NFR BLD AUTO: 10.5 %
MCH RBC QN AUTO: 26.9 PG (ref 26–34)
MCHC RBC AUTO-ENTMCNC: 32.2 G/DL (ref 32–36)
MCV RBC AUTO: 84 FL (ref 80–100)
MONOCYTES # BLD AUTO: 0.79 X10*3/UL (ref 0.1–1)
MONOCYTES NFR BLD AUTO: 6.4 %
NEUTROPHILS # BLD AUTO: 10.09 X10*3/UL (ref 1.2–7.7)
NEUTROPHILS NFR BLD AUTO: 81.2 %
NRBC BLD-RTO: 0 /100 WBCS (ref 0–0)
OVALOCYTES BLD QL SMEAR: NORMAL
P AXIS: 46 DEGREES
P OFFSET: 191 MS
P ONSET: 137 MS
PLATELET # BLD AUTO: 568 X10*3/UL (ref 150–450)
POTASSIUM SERPL-SCNC: 4.2 MMOL/L (ref 3.5–5.3)
PR INTERVAL: 152 MS
PROT SERPL-MCNC: 8.7 G/DL (ref 6.4–8.2)
Q ONSET: 213 MS
QRS COUNT: 18 BEATS
QRS DURATION: 88 MS
QT INTERVAL: 370 MS
QTC CALCULATION(BAZETT): 505 MS
QTC FREDERICIA: 455 MS
R AXIS: -56 DEGREES
RBC # BLD AUTO: 5.24 X10*6/UL (ref 4–5.2)
RBC MORPH BLD: NORMAL
SODIUM SERPL-SCNC: 135 MMOL/L (ref 136–145)
T AXIS: 134 DEGREES
T OFFSET: 398 MS
VENTRICULAR RATE: 112 BPM
WBC # BLD AUTO: 12.4 X10*3/UL (ref 4.4–11.3)

## 2024-09-18 PROCEDURE — 80053 COMPREHEN METABOLIC PANEL: CPT | Performed by: STUDENT IN AN ORGANIZED HEALTH CARE EDUCATION/TRAINING PROGRAM

## 2024-09-18 PROCEDURE — 36415 COLL VENOUS BLD VENIPUNCTURE: CPT | Performed by: STUDENT IN AN ORGANIZED HEALTH CARE EDUCATION/TRAINING PROGRAM

## 2024-09-18 PROCEDURE — 99285 EMERGENCY DEPT VISIT HI MDM: CPT | Mod: 25

## 2024-09-18 PROCEDURE — 96374 THER/PROPH/DIAG INJ IV PUSH: CPT | Mod: 59

## 2024-09-18 PROCEDURE — 84484 ASSAY OF TROPONIN QUANT: CPT | Performed by: STUDENT IN AN ORGANIZED HEALTH CARE EDUCATION/TRAINING PROGRAM

## 2024-09-18 PROCEDURE — 85025 COMPLETE CBC W/AUTO DIFF WBC: CPT | Performed by: STUDENT IN AN ORGANIZED HEALTH CARE EDUCATION/TRAINING PROGRAM

## 2024-09-18 PROCEDURE — 71275 CT ANGIOGRAPHY CHEST: CPT

## 2024-09-18 PROCEDURE — 2500000001 HC RX 250 WO HCPCS SELF ADMINISTERED DRUGS (ALT 637 FOR MEDICARE OP): Performed by: STUDENT IN AN ORGANIZED HEALTH CARE EDUCATION/TRAINING PROGRAM

## 2024-09-18 PROCEDURE — 2550000001 HC RX 255 CONTRASTS: Performed by: STUDENT IN AN ORGANIZED HEALTH CARE EDUCATION/TRAINING PROGRAM

## 2024-09-18 PROCEDURE — 82947 ASSAY GLUCOSE BLOOD QUANT: CPT

## 2024-09-18 PROCEDURE — 83690 ASSAY OF LIPASE: CPT | Performed by: STUDENT IN AN ORGANIZED HEALTH CARE EDUCATION/TRAINING PROGRAM

## 2024-09-18 PROCEDURE — 82947 ASSAY GLUCOSE BLOOD QUANT: CPT | Mod: 59

## 2024-09-18 PROCEDURE — 71275 CT ANGIOGRAPHY CHEST: CPT | Performed by: RADIOLOGY

## 2024-09-18 PROCEDURE — 2500000002 HC RX 250 W HCPCS SELF ADMINISTERED DRUGS (ALT 637 FOR MEDICARE OP, ALT 636 FOR OP/ED): Performed by: STUDENT IN AN ORGANIZED HEALTH CARE EDUCATION/TRAINING PROGRAM

## 2024-09-18 PROCEDURE — 96375 TX/PRO/DX INJ NEW DRUG ADDON: CPT | Mod: 59

## 2024-09-18 PROCEDURE — 96361 HYDRATE IV INFUSION ADD-ON: CPT

## 2024-09-18 PROCEDURE — 93005 ELECTROCARDIOGRAM TRACING: CPT

## 2024-09-18 PROCEDURE — 83880 ASSAY OF NATRIURETIC PEPTIDE: CPT | Performed by: STUDENT IN AN ORGANIZED HEALTH CARE EDUCATION/TRAINING PROGRAM

## 2024-09-18 PROCEDURE — 74177 CT ABD & PELVIS W/CONTRAST: CPT

## 2024-09-18 PROCEDURE — 2500000004 HC RX 250 GENERAL PHARMACY W/ HCPCS (ALT 636 FOR OP/ED): Performed by: STUDENT IN AN ORGANIZED HEALTH CARE EDUCATION/TRAINING PROGRAM

## 2024-09-18 PROCEDURE — 74177 CT ABD & PELVIS W/CONTRAST: CPT | Performed by: RADIOLOGY

## 2024-09-18 PROCEDURE — RXMED WILLOW AMBULATORY MEDICATION CHARGE

## 2024-09-18 RX ORDER — ONDANSETRON 4 MG/1
4 TABLET, ORALLY DISINTEGRATING ORAL EVERY 8 HOURS PRN
Qty: 15 TABLET | Refills: 0 | Status: SHIPPED | OUTPATIENT
Start: 2024-09-18

## 2024-09-18 RX ORDER — DIPHENHYDRAMINE HYDROCHLORIDE 50 MG/ML
25 INJECTION INTRAMUSCULAR; INTRAVENOUS ONCE
Status: COMPLETED | OUTPATIENT
Start: 2024-09-18 | End: 2024-09-18

## 2024-09-18 RX ORDER — SPIRONOLACTONE 25 MG/1
25 TABLET ORAL ONCE
Status: COMPLETED | OUTPATIENT
Start: 2024-09-18 | End: 2024-09-18

## 2024-09-18 RX ORDER — PROCHLORPERAZINE EDISYLATE 5 MG/ML
10 INJECTION INTRAMUSCULAR; INTRAVENOUS ONCE
Status: COMPLETED | OUTPATIENT
Start: 2024-09-18 | End: 2024-09-18

## 2024-09-18 RX ORDER — CARVEDILOL 6.25 MG/1
6.25 TABLET ORAL ONCE
Status: COMPLETED | OUTPATIENT
Start: 2024-09-18 | End: 2024-09-18

## 2024-09-18 RX ORDER — PROCHLORPERAZINE MALEATE 10 MG
10 TABLET ORAL EVERY 6 HOURS PRN
Qty: 30 TABLET | Refills: 0 | Status: SHIPPED | OUTPATIENT
Start: 2024-09-18

## 2024-09-18 RX ORDER — ONDANSETRON HYDROCHLORIDE 2 MG/ML
4 INJECTION, SOLUTION INTRAVENOUS ONCE
Status: COMPLETED | OUTPATIENT
Start: 2024-09-18 | End: 2024-09-18

## 2024-09-18 RX ORDER — FAMOTIDINE 10 MG/ML
20 INJECTION INTRAVENOUS ONCE
Status: COMPLETED | OUTPATIENT
Start: 2024-09-18 | End: 2024-09-18

## 2024-09-18 RX ORDER — LOSARTAN POTASSIUM 50 MG/1
50 TABLET ORAL ONCE
Status: COMPLETED | OUTPATIENT
Start: 2024-09-18 | End: 2024-09-18

## 2024-09-18 ASSESSMENT — PAIN SCALES - GENERAL
PAINLEVEL_OUTOF10: 0 - NO PAIN
PAINLEVEL_OUTOF10: 0 - NO PAIN

## 2024-09-18 ASSESSMENT — LIFESTYLE VARIABLES
TOTAL SCORE: 0
HAVE YOU EVER FELT YOU SHOULD CUT DOWN ON YOUR DRINKING: NO
EVER HAD A DRINK FIRST THING IN THE MORNING TO STEADY YOUR NERVES TO GET RID OF A HANGOVER: NO
HAVE PEOPLE ANNOYED YOU BY CRITICIZING YOUR DRINKING: NO
EVER FELT BAD OR GUILTY ABOUT YOUR DRINKING: NO

## 2024-09-18 ASSESSMENT — PAIN - FUNCTIONAL ASSESSMENT: PAIN_FUNCTIONAL_ASSESSMENT: 0-10

## 2024-09-18 NOTE — DISCHARGE INSTRUCTIONS
Prescriptions have been sent to the [facility] retail pharmacy for your blood thinning medication as well as nausea medications.  You should try to drink extra fluids over the coming days.  Return to the emergency department if you are unable to take your medications or have any other worsening symptoms.    It is important to remember that your care does not end here and you must continue to monitor your condition closely. Please return to the emergency department for any worsening or concerning signs or symptoms as directed by our conversations and the discharge instructions. If you do not have a doctor please contact the referral number on your discharge instructions. Please contact any physician specialists provided in your discharge notes as it is very important to follow up with them regarding your condition. If you are unable to reach the physicians provided, please come back to the Emergency Department at any time.    Return to emergency room without delay for ANY new or worsening pains or for any other symptoms or concerns.  Return with worsening pains, nausea, vomiting, trouble breathing, palpitations, shortness of breath, inability to pass stool or urine, loss of control of stool or urine, any numbness or tingling (that is not normal for you), uncontrolled fevers, the passing of blood or other material in stool or urine, rashes, pains or for any other symptoms or concerns you may have.  You are always welcome to return to the ER at any time for any reason or for any other concerns you may have.

## 2024-09-18 NOTE — TELEPHONE ENCOUNTER
Called no answer. I question whether or not medication has had this influence on her. I will await lab results, draw 2 hrs ago. Will call her once I review her labs

## 2024-09-18 NOTE — NURSING NOTE
"Inpatient Diabetes Education     Reason for Visit:  Josefina Huerta is a 43 y.o. female who presents to ED with c/o \"abdominal pain & vomiting\"   Came to ED last nigh with c/o \"leg swelling & pain and was found to have a DVT.       H/o T2DM, HTN, NSTEMI, Recent admission for DKA on 8/23/24.     Consulting Service/Provider: CIRILO Odom    Visit Type: Initial visit    Visit Modality: In-person    Discharge Equipment/Supply Needs:  Denies       Patient has supplies at home: Testing strips:   and Lancets    Patient History and Assessment:    Previous diagnosis: Type 2 - 2 years   Patient known to Diabetes Education department: No  Treatment prior to hospital admission: Oral medications Metformin & Farxiga  Complications: cardiovascular disease and obesity  PTA Medications:    Current Outpatient Medications   Medication Instructions    apixaban (Eliquis) 5 mg (74 tabs) tablet Take 2 tablets (10 mg) by mouth 2 times a day for 7 days, then take 1 tablet (5 mg) by mouth 2 times a day.    atorvastatin (LIPITOR) 40 mg, oral, Daily    carvedilol (COREG) 6.25 mg, oral, 2 times daily    dapagliflozin propanediol (FARXIGA) 10 mg, oral, Daily    losartan (COZAAR) 50 mg, oral, Daily    metFORMIN (GLUCOPHAGE) 1,000 mg, oral, 2 times daily    spironolactone (ALDACTONE) 25 mg, oral, Daily       Glucose   Date/Time Value Ref Range Status   09/18/2024 11:00  (H) 74 - 99 mg/dL Final   08/26/2024 04:13  (H) 65 - 99 mg/dL Final   08/25/2024 04:08  (H) 65 - 99 mg/dL Final   08/24/2024 07:49  (H) 65 - 99 mg/dL Final   08/24/2024 04:11  (H) 65 - 99 mg/dL Final   08/23/2024 11:57  (H) 65 - 99 mg/dL Final   08/23/2024 06:34  (H) 65 - 99 mg/dL Final   08/23/2024 02:12  (H) 65 - 99 mg/dL Final   08/12/2022 08:53  (H) 74 - 99 mg/dL Final   05/13/2022 08:30  (H) 74 - 99 mg/dL Final   02/11/2022 08:07  (H) 74 - 99 mg/dL Final   11/26/2021 11:53  (H) 74 - 99 mg/dL Final     No " "results found for: \"CPEPTIDE\"  Hemoglobin A1C   Date Value Ref Range Status   08/25/2024 9.7 (H) See below % Final   08/12/2022 6.7 (A) % Final     Comment:          Diagnosis of Diabetes-Adults   Non-Diabetic: < or = 5.6%   Increased risk for developing diabetes: 5.7-6.4%   Diagnostic of diabetes: > or = 6.5%  .       Monitoring of Diabetes                Age (y)     Therapeutic Goal (%)   Adults:          >18           <7.0   Pediatrics:    13-18           <7.5                   7-12           <8.0                   0- 6            7.5-8.5   American Diabetes Association. Diabetes Care 33(S1), Jan 2010.     05/13/2022 7.7 (A) % Final     Comment:          Diagnosis of Diabetes-Adults   Non-Diabetic: < or = 5.6%   Increased risk for developing diabetes: 5.7-6.4%   Diagnostic of diabetes: > or = 6.5%  .       Monitoring of Diabetes                Age (y)     Therapeutic Goal (%)   Adults:          >18           <7.0   Pediatrics:    13-18           <7.5                   7-12           <8.0                   0- 6            7.5-8.5   American Diabetes Association. Diabetes Care 33(S1), Jan 2010.     02/11/2022 9.1 (A) % Final     Comment:          Diagnosis of Diabetes-Adults   Non-Diabetic: < or = 5.6%   Increased risk for developing diabetes: 5.7-6.4%   Diagnostic of diabetes: > or = 6.5%  .       Monitoring of Diabetes                Age (y)     Therapeutic Goal (%)   Adults:          >18           <7.0   Pediatrics:    13-18           <7.5                   7-12           <8.0                   0- 6            7.5-8.5   American Diabetes Association. Diabetes Care 33(S1), Jan 2010.     11/26/2021 10.4 (A) % Final     Comment:          Diagnosis of Diabetes-Adults   Non-Diabetic: < or = 5.6%   Increased risk for developing diabetes: 5.7-6.4%   Diagnostic of diabetes: > or = 6.5%  .       Monitoring of Diabetes                Age (y)     Therapeutic Goal (%)   Adults:          >18           <7.0   Pediatrics:    " 13-18           <7.5                   7-12           <8.0                   0- 6            7.5-8.5   American Diabetes Association. Diabetes Care 33(S1), Jan 2010.         Patient Learning/Readiness Assessment:  Instructed on hypo-hyperglycemia and actions to take.  Patient admits just being here with DKA d/t stop taking her medications.  Discussed the importance of taking medications as prescribed.  Verbalizes understanding.  Discussed HbA1c of 9.3% dated 8/20/24  Discussed goal of less than 7%.   Discussed long term complications of hyperglycemia on the body.  Verbalizes understanding.     Reviewed  Diabetes book.                   R

## 2024-09-18 NOTE — ED PROVIDER NOTES
HPI   Chief Complaint   Patient presents with    Hypertension       Patient presents to the emergency department feeling unwell.  She reports that she has been very nauseated and has had some abdominal pain over the last 2 days.  She was seen in the emergency department last night for swelling of her right leg and diagnosed with a DVT.  She states that she began to feel unwell shortly before she was discharged.  She states that she has not been able to keep very much down over the last 2 days and feels that she may be dehydrated.  Her abdominal pain is located in the lower abdomen.  Her right leg has been swollen.              Patient History   Past Medical History:   Diagnosis Date    Diabetes mellitus (Multi)     TYPE @    Hypertension     Non-STEMI (non-ST elevated myocardial infarction) (Multi)      Past Surgical History:   Procedure Laterality Date    CARDIAC CATHETERIZATION N/A 8/26/2024    Procedure: Left Heart Cath;  Surgeon: Art Rice DO;  Location: [facility] Cardiac Cath Lab;  Service: Cardiovascular;  Laterality: N/A;     Family History   Problem Relation Name Age of Onset    Heart attack Father       Social History     Tobacco Use    Smoking status: Never    Smokeless tobacco: Never   Vaping Use    Vaping status: Never Used   Substance Use Topics    Alcohol use: Yes     Comment: rare    Drug use: Never       Physical Exam   ED Triage Vitals [09/18/24 0940]   Temperature Heart Rate Respirations BP   36.5 °C (97.7 °F) (!) 124 16 116/90      Pulse Ox Temp Source Heart Rate Source Patient Position   100 % Oral Monitor --      BP Location FiO2 (%)     -- --       Physical Exam  HENT:      Head: Normocephalic.   Eyes:      General: No scleral icterus.  Cardiovascular:      Rate and Rhythm: Tachycardia present.   Pulmonary:      Effort: Pulmonary effort is normal.   Abdominal:      Comments: Soft, no significant tenderness to palpation.  Examination limited by elevated BMI.   Neurological:      General: No  focal deficit present.      Mental Status: She is alert.           ED Course & MDM   ED Course as of 09/18/24 1510   Wed Sep 18, 2024   1018 EKG interpreted by me: Sinus tachycardia, rate 112.  Leftward axis.  T wave inversion noted in lead I and aVL.  Compared with previous EKG dated 23 August 2024, no changes are seen. [ML]      ED Course User Index  [ML] Weston Herr MD         Diagnoses as of 09/18/24 1510   Nausea   Abdominal pain, unspecified abdominal location   Tachycardia   Acute pulmonary embolism, unspecified pulmonary embolism type, unspecified whether acute cor pulmonale present (Multi)                 No data recorded     [place] Coma Scale Score: 15 (09/18/24 0946 : Taya Gruber, RN)                           Medical Decision Making  Following doses of Zofran, Compazine, and Benadryl, patient reports that her symptoms have significantly improved.  CT of the chest abdomen and pelvis reveals small PE which is not felt to be cause of patient's symptoms.  Remainder of CT is without acute findings.  Patient was able to tolerate oral intake and was given her 3 blood pressure medications as well as Eliquis that she was not able to take already today.  Patient given prescription for nausea medications as well as advised to obtain her prescription for Eliquis which was given yesterday.  Patient advised to follow-up with primary care physician.  Return precautions given for any worsening symptoms.  My suspicion for bowel obstruction, bowel ischemia, perforation, AAA, aortic dissection, appendicitis, cholecystitis as etiology of symptoms is very low.  Acute coronary syndrome felt to be very unlikely.  Given small size of PE seen on CT, this is not felt to be hemodynamically significant.  Patient is already being treated with Eliquis for her DVT.  Patient is felt to be at low risk for adverse event from pulmonary embolism by SPesi score.  Parts of this chart were completed with dictation software, please  excuse any errors in transcription.        Procedure  Procedures     Weston Herr MD  09/18/24 1527

## 2024-09-19 ENCOUNTER — OFFICE VISIT (OUTPATIENT)
Dept: PRIMARY CARE | Facility: CLINIC | Age: 44
End: 2024-09-19
Payer: COMMERCIAL

## 2024-09-19 ENCOUNTER — E-VISIT (OUTPATIENT)
Dept: PRIMARY CARE | Facility: CLINIC | Age: 44
End: 2024-09-19
Payer: COMMERCIAL

## 2024-09-19 VITALS
WEIGHT: 286 LBS | OXYGEN SATURATION: 100 % | TEMPERATURE: 96 F | SYSTOLIC BLOOD PRESSURE: 150 MMHG | BODY MASS INDEX: 43.35 KG/M2 | DIASTOLIC BLOOD PRESSURE: 86 MMHG | HEART RATE: 93 BPM | HEIGHT: 68 IN

## 2024-09-19 DIAGNOSIS — R10.819 LOWER ABDOMINAL TENDERNESS: Primary | ICD-10-CM

## 2024-09-19 DIAGNOSIS — I50.21 ACUTE SYSTOLIC HEART FAILURE: ICD-10-CM

## 2024-09-19 DIAGNOSIS — I51.81 TAKOTSUBO CARDIOMYOPATHY: ICD-10-CM

## 2024-09-19 DIAGNOSIS — E11.10 TYPE 2 DIABETES MELLITUS WITH KETOACIDOSIS WITHOUT COMA, WITHOUT LONG-TERM CURRENT USE OF INSULIN: ICD-10-CM

## 2024-09-19 DIAGNOSIS — I26.99 OTHER ACUTE PULMONARY EMBOLISM, UNSPECIFIED WHETHER ACUTE COR PULMONALE PRESENT (MULTI): ICD-10-CM

## 2024-09-19 DIAGNOSIS — I82.4Y1 ACUTE DEEP VEIN THROMBOSIS (DVT) OF PROXIMAL VEIN OF RIGHT LOWER EXTREMITY (MULTI): ICD-10-CM

## 2024-09-19 DIAGNOSIS — I10 PRIMARY HYPERTENSION: Primary | ICD-10-CM

## 2024-09-19 PROBLEM — N17.9 AKI (ACUTE KIDNEY INJURY) (CMS-HCC): Status: ACTIVE | Noted: 2024-09-19

## 2024-09-19 PROBLEM — I82.409 DVT (DEEP VENOUS THROMBOSIS) (MULTI): Status: ACTIVE | Noted: 2024-09-19

## 2024-09-19 PROCEDURE — 3008F BODY MASS INDEX DOCD: CPT | Performed by: FAMILY MEDICINE

## 2024-09-19 PROCEDURE — 99214 OFFICE O/P EST MOD 30 MIN: CPT | Performed by: FAMILY MEDICINE

## 2024-09-19 PROCEDURE — 3077F SYST BP >= 140 MM HG: CPT | Performed by: FAMILY MEDICINE

## 2024-09-19 PROCEDURE — 3046F HEMOGLOBIN A1C LEVEL >9.0%: CPT | Performed by: FAMILY MEDICINE

## 2024-09-19 PROCEDURE — 4010F ACE/ARB THERAPY RXD/TAKEN: CPT | Performed by: FAMILY MEDICINE

## 2024-09-19 PROCEDURE — 3079F DIAST BP 80-89 MM HG: CPT | Performed by: FAMILY MEDICINE

## 2024-09-19 RX ORDER — NEBULIZER AND COMPRESSOR
1 EACH MISCELLANEOUS DAILY
Qty: 1 EACH | Refills: 0 | Status: SHIPPED | OUTPATIENT
Start: 2024-09-19

## 2024-09-19 RX ORDER — FLUCONAZOLE 150 MG/1
150 TABLET ORAL ONCE
Qty: 1 TABLET | Refills: 0 | Status: SHIPPED | OUTPATIENT
Start: 2024-09-19 | End: 2024-09-21

## 2024-09-19 ASSESSMENT — PATIENT HEALTH QUESTIONNAIRE - PHQ9
1. LITTLE INTEREST OR PLEASURE IN DOING THINGS: NOT AT ALL
SUM OF ALL RESPONSES TO PHQ9 QUESTIONS 1 AND 2: 0
2. FEELING DOWN, DEPRESSED OR HOPELESS: NOT AT ALL

## 2024-09-19 ASSESSMENT — PAIN SCALES - GENERAL: PAINLEVEL: 0-NO PAIN

## 2024-09-19 NOTE — ASSESSMENT & PLAN NOTE
- Urgency department documentation and workup reviewed with appreciated and Eliquis regimen started  -Will continue on blood thinners at this time along with cardiology follow-up as scheduled

## 2024-09-19 NOTE — PROGRESS NOTES
Outpatient Visit Note    Chief Complaint   Patient presents with    Follow-up         HPI:  Josefina Huerta is a 43 y.o. female who presents to the office for blood-pressure follow-up.  Patient was last last seen office on 9/11/2024 for hospital follow-up.    In review, patient presented to the [facility] emergency department on 8/23/2024 secondary to complaints of nausea with vomiting and diarrhea.  Patient stated symptoms started 2 days prior with diarrhea having resolved though she was unable to keep anything down secondary to intractable nausea.  She denies any fever, chills, night sweats, cough or congestion.  Patient was hypertensive and tachycardic on evaluation and was notably anxious.  Workup noted elevated lactic acid with leukocytosis of 22 2 weeks IV antibiotics were initiated and blood cultures will ordered.  CMP noted prominent hyperglycemia at 426 with GFR of 57 and prominently elevated troponins.  EKG showed no acute STEMI CT abdomen noted fatty liver with chest x-ray normal, though CT angio noted bilateral groundglass opacities consistent with probable reactive airway disease and enlarged thyroid gland with coarse calcifications.  She was ultimately started on insulin drip and admitted to ICU for further evaluation.  Confirmed that she had been off of her metformin regimen for several months after insurance changed.  Cardiology was consulted.    Labs were ultimately consistent with mild DKA and elevated troponins with echocardiogram completed showing EF of 30 to 55% and concerns for possible stress-induced cardiomyopathy.  Patient underwent cardiac cath on 8/26/2024 which showed mild nonobstructive coronary artery disease.  Stenting was not required.  She was cleared by cardiology for outpatient follow-up for management of CHF, hypertension and type 2 diabetes.  Was initiated on regimen of atorvastatin 40 mg daily carvedilol 6.5 mg twice daily losartan 50 mg, metformin 1000 twice a day and  Farxiga 10 mg daily.  Of note, TSH was completed in the hospital which was within normal range.  A1c showed poor control at 9.7%.    She had hospital/post cath follow-up on 9/9/2024 with Dr. Rice.  At that time she stated to be feeling well.  Blood pressure continued elevated at 150/100.  Plan was sent for repeat echocardiogram in 2 months with Aldactone added for further blood pressure control.  Stated to be feeling better each day since discharge from hospital.    At last encounter she reported intermittent lower abdominal pelvic pressure with concerns for possible UTI.  Denied overt urinary frequency/urgency.  Denied any polyuria, polydipsia, polyphagia or acute vision/sensation changes.  Urinalysis was performed showing prominent hyperglycemia consistent with Farxiga use.  Patient was ultimately given fluconazole prescription to cover against probable mycotic infection.    Patient contacted office on 9/13/2024 she noted right lower leg calf swelling and achiness since starting MiraLAX at home.  At present patient may be experiencing cramping from loss of potassium to which she was encouraged to increase dietary potassium intake and communicate concerns to cardiology.  Swelling did progress to which she presented to the ER on 9/17/2024.  Ultimately had duplex ultrasound which was positive for DVT to which she was started on Eliquis.  Kidney function was within normal range.  Blood pressure was noted to be elevated.  She subsequently returned to emergency department on 9/18/2024 stating to be feeling unwell.  She reported significant nausea and abdominal pain the last 2 days.  Stated to feel unwell shortly before she was discharged, with struggles keeping food down.  Stated that she may have been dehydrated.  Did note lower abdominal discomfort.  She was given Zofran, Compazine and Benadryl, stating that symptoms improved.  CT abdomen/pelvis revealed small PE which was not felt to be cause of patient's  symptoms..  She was able to tolerate oral intake with blood pressure medications given as well as Eliquis.  Prescription for nausea medication was given with plans for PCP follow-up.  Blood work did note elevated BNP along with elevated troponins.  Lipase was normal.  Glucose levels noted hyperglycemia with mildly decreased sodium and increased creatinine with mild decrease in GFR.    Today she reports gradually improving nausea with use of Zofran.  States that leg remains mildly sore but has also improved.  No reports of shortness of breath.  Eliquis has been well-tolerated without adverse side effects.    Current Medications  Current Outpatient Medications   Medication Instructions    apixaban (Eliquis) 5 mg (74 tabs) tablet Take 2 tablets (10 mg) by mouth 2 times a day for 7 days, then take 1 tablet (5 mg) by mouth 2 times a day.    atorvastatin (LIPITOR) 40 mg, oral, Daily    carvedilol (COREG) 6.25 mg, oral, 2 times daily    Farxiga 10 mg, oral, Daily    losartan (COZAAR) 50 mg, oral, Daily    metFORMIN (GLUCOPHAGE) 1,000 mg, oral, 2 times daily    miscellaneous medical supply (Blood Pressure Cuff) misc 1 Device, miscellaneous, Daily    ondansetron ODT (Zofran-ODT) 4 mg disintegrating tablet Dissolve 1 tablet (4 mg) on top of the tongue every 8 hours if needed for nausea or vomiting.    prochlorperazine (COMPAZINE) 10 mg, oral, Every 6 hours PRN    spironolactone (ALDACTONE) 25 mg, oral, Daily        Allergies  No Known Allergies     Past Medical History:   Diagnosis Date    Diabetes mellitus (Multi)     TYPE @    Hypertension     Non-STEMI (non-ST elevated myocardial infarction) (Multi)       Past Surgical History:   Procedure Laterality Date    CARDIAC CATHETERIZATION N/A 8/26/2024    Procedure: Left Heart Cath;  Surgeon: Art Rice DO;  Location: [facility] Cardiac Cath Lab;  Service: Cardiovascular;  Laterality: N/A;     Family History   Problem Relation Name Age of Onset    Heart attack Father       Social  History     Tobacco Use    Smoking status: Never    Smokeless tobacco: Never   Vaping Use    Vaping status: Never Used   Substance Use Topics    Alcohol use: Yes     Comment: rare    Drug use: Never       ROS  All pertinent positive symptoms are included in the history of present illness.  All other systems have been reviewed and are negative and noncontributory to this patient's current ailments.    VITAL SIGNS  Vitals:    09/19/24 1053   BP: 150/86   Pulse: 93   Temp: 35.6 °C (96 °F)   SpO2: 100%         PHYSICAL EXAM  GENERAL APPEARANCE: alert and oriented, Pleasant and cooperative, No Acute Distress  HEENT: EOMI, PERRLA, MMM  HEART: RRR, normal S1S2, no murmurs, click or rubs  LUNGS: clear to auscultation bilaterally, no wheezes/rhonchi/rales  EXTREMITIES: no edema, normal ROM  SKIN: normal, no rash, unremarkable  NEUROLOGIC EXAM: non-focal exam  MUSCULOSKELETAL: no gross abnormalities  PSYCH: affect is normal, eye contact is good      Assessment/Plan   Problem List Items Addressed This Visit             ICD-10-CM    Acute systolic heart failure (Multi) I50.21     -Continue to focus medication compliance and cardiac follow-up as scheduled         Relevant Orders    Referral to Clinical Pharmacy    Type 2 diabetes mellitus with ketoacidosis without coma, without long-term current use of insulin (Multi) E11.10     -Will focus on compliance with metformin twice daily and Farxiga along with healthy, low-fat diet with moderation carbohydrates/processed sugars         Relevant Orders    Referral to Clinical Pharmacy    Primary hypertension - Primary I10     - Blood pressure mildly elevated in office today though better than has been running recently  -Will plan to continue with current regiment monitoring symptoms and pressures with prescription given for home blood pressure cuff  -Advocated focus on healthy, balanced diet with moderation of salt/caffeine         Relevant Medications    miscellaneous medical supply  (Blood Pressure Cuff) misc    Other Relevant Orders    Referral to Clinical Pharmacy    Takotsubo cardiomyopathy I51.81    Relevant Orders    Referral to Clinical Pharmacy    DVT (deep venous thrombosis) (Multi) I82.409     - Urgency department documentation and workup reviewed with appreciated and Eliquis regimen started  -Will continue on blood thinners at this time along with cardiology follow-up as scheduled         Relevant Orders    Referral to Clinical Pharmacy    Pulmonary embolism (Multi) I26.99     - Most recent emergency department imaging did show small pulmonary embolism which we will continue on Eliquis regimen  -With the development of multiple blood clots, need for long-term Eliquis may be appropriate         Relevant Orders    Referral to Clinical Pharmacy         Counseling:       Medication education:         Education:  The patient is counseled regarding potential side-effects of all new medications        Understanding:  Patient expressed understanding        Adherence:  No barriers to adherence identified    ** Please excuse any errors in grammar or translation related to this dictation. Voice recognition software was utilized to prepare this document. **

## 2024-09-19 NOTE — PATIENT INSTRUCTIONS
Problem List Items Addressed This Visit             ICD-10-CM    Acute systolic heart failure (Multi) I50.21     -Continue to focus medication compliance and cardiac follow-up as scheduled         Relevant Orders    Referral to Clinical Pharmacy    Type 2 diabetes mellitus with ketoacidosis without coma, without long-term current use of insulin (Multi) E11.10     -Will focus on compliance with metformin twice daily and Farxiga along with healthy, low-fat diet with moderation carbohydrates/processed sugars         Relevant Orders    Referral to Clinical Pharmacy    Primary hypertension - Primary I10     - Blood pressure mildly elevated in office today though better than has been running recently  -Will plan to continue with current regiment monitoring symptoms and pressures with prescription given for home blood pressure cuff  -Advocated focus on healthy, balanced diet with moderation of salt/caffeine         Relevant Medications    miscellaneous medical supply (Blood Pressure Cuff) misc    Other Relevant Orders    Referral to Clinical Pharmacy    Takotsubo cardiomyopathy I51.81    Relevant Orders    Referral to Clinical Pharmacy    DVT (deep venous thrombosis) (Multi) I82.409     - Urgency department documentation and workup reviewed with appreciated and Eliquis regimen started  -Will continue on blood thinners at this time along with cardiology follow-up as scheduled         Relevant Orders    Referral to Clinical Pharmacy    Pulmonary embolism (Multi) I26.99     - Most recent emergency department imaging did show small pulmonary embolism which we will continue on Eliquis regimen  -With the development of multiple blood clots, need for long-term Eliquis may be appropriate         Relevant Orders    Referral to Clinical Pharmacy         Counseling:       Medication education:         Education:  The patient is counseled regarding potential side-effects of all new medications        Understanding:  Patient expressed  understanding        Adherence:  No barriers to adherence identified    ** Please excuse any errors in grammar or translation related to this dictation. Voice recognition software was utilized to prepare this document. **

## 2024-09-19 NOTE — ASSESSMENT & PLAN NOTE
-Will focus on compliance with metformin twice daily and Farxiga along with healthy, low-fat diet with moderation carbohydrates/processed sugars

## 2024-09-19 NOTE — ASSESSMENT & PLAN NOTE
- Most recent emergency department imaging did show small pulmonary embolism which we will continue on Eliquis regimen  -With the development of multiple blood clots, need for long-term Eliquis may be appropriate

## 2024-09-19 NOTE — ASSESSMENT & PLAN NOTE
>>ASSESSMENT AND PLAN FOR HISTORY OF BLOOD CLOTS WRITTEN ON 12/30/2024  6:31 AM BY JIMENEZ DAS DO    >>ASSESSMENT AND PLAN FOR DVT (DEEP VENOUS THROMBOSIS) (MULTI) WRITTEN ON 9/19/2024  7:54 AM BY JIMENEZ DAS DO    - Urgency department documentation and workup reviewed with appreciated and Eliquis regimen started  -Will continue on blood thinners at this time along with cardiology follow-up as scheduled    >>ASSESSMENT AND PLAN FOR PULMONARY EMBOLISM WRITTEN ON 9/19/2024  7:55 AM BY JIMENEZ DAS DO    - Most recent emergency department imaging did show small pulmonary embolism which we will continue on Eliquis regimen  -With the development of multiple blood clots, need for long-term Eliquis may be appropriate

## 2024-09-19 NOTE — ASSESSMENT & PLAN NOTE
- Blood pressure mildly elevated in office today though better than has been running recently  -Will plan to continue with current regiment monitoring symptoms and pressures with prescription given for home blood pressure cuff  -Advocated focus on healthy, balanced diet with moderation of salt/caffeine

## 2024-09-19 NOTE — ASSESSMENT & PLAN NOTE
- Mild kidney strain appreciated in emergency department which may have been exacerbated by nausea contributing towards decreased intake/dehydration

## 2024-09-20 ENCOUNTER — PHARMACY VISIT (OUTPATIENT)
Dept: PHARMACY | Facility: CLINIC | Age: 44
End: 2024-09-20
Payer: COMMERCIAL

## 2024-09-20 ENCOUNTER — LAB (OUTPATIENT)
Dept: LAB | Facility: LAB | Age: 44
End: 2024-09-20
Payer: COMMERCIAL

## 2024-09-20 DIAGNOSIS — R10.819 LOWER ABDOMINAL TENDERNESS: ICD-10-CM

## 2024-09-20 LAB
APPEARANCE UR: CLEAR
BILIRUB UR STRIP.AUTO-MCNC: ABNORMAL MG/DL
COLOR UR: ABNORMAL
GLUCOSE UR STRIP.AUTO-MCNC: ABNORMAL MG/DL
KETONES UR STRIP.AUTO-MCNC: ABNORMAL MG/DL
LEUKOCYTE ESTERASE UR QL STRIP.AUTO: NEGATIVE
MUCOUS THREADS #/AREA URNS AUTO: ABNORMAL /LPF
NITRITE UR QL STRIP.AUTO: NEGATIVE
PH UR STRIP.AUTO: 5.5 [PH]
PROT UR STRIP.AUTO-MCNC: ABNORMAL MG/DL
RBC # UR STRIP.AUTO: NEGATIVE /UL
RBC #/AREA URNS AUTO: ABNORMAL /HPF
SP GR UR STRIP.AUTO: 1.04
SQUAMOUS #/AREA URNS AUTO: ABNORMAL /HPF
URATE CRY #/AREA UR COMP ASSIST: ABNORMAL /HPF
UROBILINOGEN UR STRIP.AUTO-MCNC: ABNORMAL MG/DL
WBC #/AREA URNS AUTO: ABNORMAL /HPF

## 2024-09-20 PROCEDURE — 81001 URINALYSIS AUTO W/SCOPE: CPT

## 2024-09-20 PROCEDURE — RXMED WILLOW AMBULATORY MEDICATION CHARGE

## 2024-09-21 LAB — HOLD SPECIMEN: NORMAL

## 2024-09-24 ENCOUNTER — PATIENT OUTREACH (OUTPATIENT)
Dept: PRIMARY CARE | Facility: CLINIC | Age: 44
End: 2024-09-24
Payer: COMMERCIAL

## 2024-10-07 ENCOUNTER — HOSPITAL ENCOUNTER (OUTPATIENT)
Dept: CARDIOLOGY | Facility: HOSPITAL | Age: 44
Discharge: HOME | End: 2024-10-07
Payer: COMMERCIAL

## 2024-10-07 DIAGNOSIS — I51.81 TAKOTSUBO CARDIOMYOPATHY: ICD-10-CM

## 2024-10-07 DIAGNOSIS — I50.21 ACUTE SYSTOLIC (CONGESTIVE) HEART FAILURE: ICD-10-CM

## 2024-10-07 LAB
AORTIC VALVE MEAN GRADIENT: 3.4 MMHG
AORTIC VALVE PEAK VELOCITY: 1.43 M/S
AV PEAK GRADIENT: 8.2 MMHG
AVA (PEAK VEL): 2.47 CM2
AVA (VTI): 2.24 CM2
EJECTION FRACTION APICAL 4 CHAMBER: 57.7
EJECTION FRACTION: 58 %
LEFT VENTRICLE INTERNAL DIMENSION DIASTOLE: 5.33 CM (ref 3.5–6)
LEFT VENTRICULAR OUTFLOW TRACT DIAMETER: 1.99 CM
LV EJECTION FRACTION BIPLANE: 54 %
MITRAL VALVE E/A RATIO: 0.98

## 2024-10-07 PROCEDURE — 93306 TTE W/DOPPLER COMPLETE: CPT

## 2024-10-07 PROCEDURE — 93306 TTE W/DOPPLER COMPLETE: CPT | Performed by: INTERNAL MEDICINE

## 2024-10-19 DIAGNOSIS — I26.99 ACUTE PULMONARY EMBOLISM, UNSPECIFIED PULMONARY EMBOLISM TYPE, UNSPECIFIED WHETHER ACUTE COR PULMONALE PRESENT (MULTI): ICD-10-CM

## 2024-10-19 PROCEDURE — RXMED WILLOW AMBULATORY MEDICATION CHARGE

## 2024-10-19 RX ORDER — APIXABAN 5 MG (74)
KIT ORAL
Qty: 74 TABLET | Refills: 0 | Status: CANCELLED | OUTPATIENT
Start: 2024-10-19

## 2024-10-20 PROCEDURE — RXMED WILLOW AMBULATORY MEDICATION CHARGE

## 2024-10-21 ENCOUNTER — PHARMACY VISIT (OUTPATIENT)
Dept: PHARMACY | Facility: CLINIC | Age: 44
End: 2024-10-21
Payer: COMMERCIAL

## 2024-10-21 DIAGNOSIS — I26.99 ACUTE PULMONARY EMBOLISM, UNSPECIFIED PULMONARY EMBOLISM TYPE, UNSPECIFIED WHETHER ACUTE COR PULMONALE PRESENT (MULTI): ICD-10-CM

## 2024-10-21 RX ORDER — APIXABAN 5 MG (74)
KIT ORAL
Qty: 74 TABLET | Refills: 0 | Status: CANCELLED | OUTPATIENT
Start: 2024-10-19

## 2024-10-22 DIAGNOSIS — I26.99 ACUTE PULMONARY EMBOLISM, UNSPECIFIED PULMONARY EMBOLISM TYPE, UNSPECIFIED WHETHER ACUTE COR PULMONALE PRESENT (MULTI): ICD-10-CM

## 2024-10-22 RX ORDER — APIXABAN 5 MG (74)
KIT ORAL
Qty: 74 TABLET | Refills: 0 | Status: CANCELLED | OUTPATIENT
Start: 2024-10-19

## 2024-10-23 DIAGNOSIS — I26.99 ACUTE PULMONARY EMBOLISM, UNSPECIFIED PULMONARY EMBOLISM TYPE, UNSPECIFIED WHETHER ACUTE COR PULMONALE PRESENT (MULTI): ICD-10-CM

## 2024-10-23 RX ORDER — APIXABAN 5 MG (74)
KIT ORAL
Qty: 74 TABLET | Refills: 0 | Status: CANCELLED | OUTPATIENT
Start: 2024-10-19

## 2024-10-25 DIAGNOSIS — I26.99 ACUTE PULMONARY EMBOLISM, UNSPECIFIED PULMONARY EMBOLISM TYPE, UNSPECIFIED WHETHER ACUTE COR PULMONALE PRESENT (MULTI): ICD-10-CM

## 2024-10-25 PROCEDURE — RXMED WILLOW AMBULATORY MEDICATION CHARGE

## 2024-10-25 RX ORDER — APIXABAN 5 MG (74)
KIT ORAL
Qty: 74 TABLET | Refills: 0 | Status: CANCELLED | OUTPATIENT
Start: 2024-10-19

## 2024-10-29 ENCOUNTER — PHARMACY VISIT (OUTPATIENT)
Dept: PHARMACY | Facility: CLINIC | Age: 44
End: 2024-10-29
Payer: COMMERCIAL

## 2024-11-04 ENCOUNTER — OFFICE VISIT (OUTPATIENT)
Dept: CARDIOLOGY | Facility: CLINIC | Age: 44
End: 2024-11-04
Payer: COMMERCIAL

## 2024-11-04 VITALS — SYSTOLIC BLOOD PRESSURE: 158 MMHG | DIASTOLIC BLOOD PRESSURE: 98 MMHG | HEART RATE: 88 BPM

## 2024-11-04 DIAGNOSIS — I26.99 OTHER ACUTE PULMONARY EMBOLISM, UNSPECIFIED WHETHER ACUTE COR PULMONALE PRESENT (MULTI): ICD-10-CM

## 2024-11-04 DIAGNOSIS — I51.81 TAKOTSUBO CARDIOMYOPATHY: Primary | ICD-10-CM

## 2024-11-04 DIAGNOSIS — I50.21 ACUTE SYSTOLIC HEART FAILURE: ICD-10-CM

## 2024-11-04 DIAGNOSIS — I10 PRIMARY HYPERTENSION: ICD-10-CM

## 2024-11-04 PROCEDURE — 3080F DIAST BP >= 90 MM HG: CPT | Performed by: INTERNAL MEDICINE

## 2024-11-04 PROCEDURE — 3077F SYST BP >= 140 MM HG: CPT | Performed by: INTERNAL MEDICINE

## 2024-11-04 PROCEDURE — 3046F HEMOGLOBIN A1C LEVEL >9.0%: CPT | Performed by: INTERNAL MEDICINE

## 2024-11-04 PROCEDURE — RXMED WILLOW AMBULATORY MEDICATION CHARGE

## 2024-11-04 PROCEDURE — 4010F ACE/ARB THERAPY RXD/TAKEN: CPT | Performed by: INTERNAL MEDICINE

## 2024-11-04 PROCEDURE — 99214 OFFICE O/P EST MOD 30 MIN: CPT | Performed by: INTERNAL MEDICINE

## 2024-11-04 PROCEDURE — 1036F TOBACCO NON-USER: CPT | Performed by: INTERNAL MEDICINE

## 2024-11-04 RX ORDER — LOSARTAN POTASSIUM 50 MG/1
50 TABLET ORAL 2 TIMES DAILY
Qty: 180 TABLET | Refills: 1 | Status: SHIPPED | OUTPATIENT
Start: 2024-11-04 | End: 2025-05-03

## 2024-11-04 RX ORDER — LOSARTAN POTASSIUM 50 MG/1
50 TABLET ORAL 2 TIMES DAILY
Qty: 180 TABLET | Refills: 1 | Status: SHIPPED | OUTPATIENT
Start: 2024-11-04 | End: 2024-11-04 | Stop reason: SDUPTHER

## 2024-11-04 ASSESSMENT — ENCOUNTER SYMPTOMS
MYALGIAS: 0
PALPITATIONS: 0
LOSS OF SENSATION IN FEET: 0
DYSPNEA ON EXERTION: 0
IRREGULAR HEARTBEAT: 0
FEVER: 0
WHEEZING: 0
DIZZINESS: 0
NEAR-SYNCOPE: 0
SHORTNESS OF BREATH: 0
COUGH: 0
CLAUDICATION: 0
SYNCOPE: 0
PND: 0
WEAKNESS: 0
OCCASIONAL FEELINGS OF UNSTEADINESS: 0
ORTHOPNEA: 0
WEIGHT GAIN: 0
WEIGHT LOSS: 0
DEPRESSION: 0
DIAPHORESIS: 0

## 2024-11-04 ASSESSMENT — LIFESTYLE VARIABLES
HAVE YOU OR SOMEONE ELSE BEEN INJURED AS A RESULT OF YOUR DRINKING: NO
AUDIT-C TOTAL SCORE: 0
HOW OFTEN DO YOU HAVE SIX OR MORE DRINKS ON ONE OCCASION: NEVER
HOW MANY STANDARD DRINKS CONTAINING ALCOHOL DO YOU HAVE ON A TYPICAL DAY: PATIENT DOES NOT DRINK
HAS A RELATIVE, FRIEND, DOCTOR, OR ANOTHER HEALTH PROFESSIONAL EXPRESSED CONCERN ABOUT YOUR DRINKING OR SUGGESTED YOU CUT DOWN: NO
AUDIT TOTAL SCORE: 0
HOW OFTEN DO YOU HAVE A DRINK CONTAINING ALCOHOL: NEVER
SKIP TO QUESTIONS 9-10: 1

## 2024-11-04 ASSESSMENT — PATIENT HEALTH QUESTIONNAIRE - PHQ9
2. FEELING DOWN, DEPRESSED OR HOPELESS: NOT AT ALL
1. LITTLE INTEREST OR PLEASURE IN DOING THINGS: NOT AT ALL
SUM OF ALL RESPONSES TO PHQ9 QUESTIONS 1 AND 2: 0

## 2024-11-04 NOTE — ASSESSMENT & PLAN NOTE
>>ASSESSMENT AND PLAN FOR PULMONARY EMBOLISM WRITTEN ON 11/4/2024  9:40 AM BY VICTORINO CHRISTY, DO    Unprovoked. Would like to set up a visit with Vascular Medicine. Continue Eliquis

## 2024-11-04 NOTE — PROGRESS NOTES
"Subjective      Chief Complaint   Patient presents with    Follow-up    Results     Pt here today for a 2 month follow up. Pt post echocardiogram, wishes to review results with provider. Pt states feeling \"better\" since last visit. No complaints in office.        44-year-old female with history of diabetes mellitus hospitalized August 2024 with nausea malaise fatigue.  She was found to DKA.  Her kidney function was normal throughout the stay.  High-sensitivity troponins drawn at admission were elevated in the 200s with a relatively insignificant trend.  She had an echocardiogram showing systolic dysfunction, she was catheterized to find mild nonobstructive disease.  Ventriculogram shows an EF of 30 to 35% and wall motion abnormalities very characteristic of Takotsubo's stress-induced nonischemic cardiomyopathy.  She was placed on guideline directed medical therapy which included an ARB, beta-blocker, and SGLT2 inhibitor.  Subsequently she was diagnosed with a lower extremity DVT and a small PE.  She was placed on oral anticoagulation.  We did repeat an echocardiogram last month which shows complete recovery of LV systolic function.         Review of Systems   Constitutional: Negative for diaphoresis, fever, weight gain and weight loss.   Eyes:  Negative for visual disturbance.   Cardiovascular:  Negative for chest pain, claudication, dyspnea on exertion, irregular heartbeat, leg swelling, near-syncope, orthopnea, palpitations, paroxysmal nocturnal dyspnea and syncope.   Respiratory:  Negative for cough, shortness of breath and wheezing.    Musculoskeletal:  Negative for muscle weakness and myalgias.   Neurological:  Negative for dizziness and weakness.   All other systems reviewed and are negative.       Past Medical History:   Diagnosis Date    Diabetes mellitus (Multi)     TYPE @    Hypertension     Non-STEMI (non-ST elevated myocardial infarction) (Multi)         Past Surgical History:   Procedure Laterality Date "    CARDIAC CATHETERIZATION N/A 8/26/2024    Procedure: Left Heart Cath;  Surgeon: Art Rice DO;  Location: [facility] Cardiac Cath Lab;  Service: Cardiovascular;  Laterality: N/A;        Social History     Socioeconomic History    Marital status: Single     Spouse name: Not on file    Number of children: Not on file    Years of education: Not on file    Highest education level: Not on file   Occupational History    Not on file   Tobacco Use    Smoking status: Never    Smokeless tobacco: Never   Vaping Use    Vaping status: Never Used   Substance and Sexual Activity    Alcohol use: Yes     Comment: rare    Drug use: Yes     Types: Marijuana, Other     Comment: Gummies/ rarely/ for sleep    Sexual activity: Defer   Other Topics Concern    Not on file   Social History Narrative    Not on file     Social Drivers of Health     Financial Resource Strain: Patient Declined (8/23/2024)    Overall Financial Resource Strain (CARDIA)     Difficulty of Paying Living Expenses: Patient declined   Food Insecurity: Not on file   Transportation Needs: Patient Declined (8/23/2024)    PRAPARE - Transportation     Lack of Transportation (Medical): Patient declined     Lack of Transportation (Non-Medical): Patient declined   Physical Activity: Not on file   Stress: Not on file   Social Connections: Not on file   Intimate Partner Violence: Not on file   Housing Stability: Patient Declined (8/23/2024)    Housing Stability Vital Sign     Unable to Pay for Housing in the Last Year: Patient declined     Number of Times Moved in the Last Year: 1     Homeless in the Last Year: Patient declined        Family History   Problem Relation Name Age of Onset    Heart attack Father          OBJECTIVE:    Vitals:    11/04/24 0915   BP: (!) 158/98   Pulse: 88        Vitals reviewed.   Constitutional:       Appearance: Normal and healthy appearance. Not in distress.   Pulmonary:      Effort: Pulmonary effort is normal.      Breath sounds: Normal breath  "sounds.   Cardiovascular:      Normal rate. Regular rhythm. Normal S1. Normal S2.       Murmurs: There is no murmur.      No gallop.  No click.   Pulses:     Intact distal pulses.   Edema:     Peripheral edema absent.   Skin:     General: Skin is warm and dry.   Neurological:      General: No focal deficit present.          Lab Review:   Lab Results   Component Value Date    CHOL 254 (H) 02/11/2022    TRIG 282 (H) 02/11/2022    HDL 38.9 (A) 02/11/2022       No results found for: \"LDLCALC\"     Takotsubo cardiomyopathy  Most recent echo shows complete recovery of LV systolic function. Would like to continue GDMT for at least 6 more months before tapering. Euvolemic on exam today    Primary hypertension  Uncontrolled. Increasing losartan to 50mg BID. BMP. Also checking lipids    Pulmonary embolism (Multi)  Unprovoked. Would like to set up a visit with Vascular Medicine. Continue Eliquis       "

## 2024-11-04 NOTE — ASSESSMENT & PLAN NOTE
Most recent echo shows complete recovery of LV systolic function. Would like to continue GDMT for at least 6 more months before tapering. Euvolemic on exam today

## 2024-11-05 ENCOUNTER — PHARMACY VISIT (OUTPATIENT)
Dept: PHARMACY | Facility: CLINIC | Age: 44
End: 2024-11-05
Payer: COMMERCIAL

## 2024-11-11 PROCEDURE — RXMED WILLOW AMBULATORY MEDICATION CHARGE

## 2024-11-13 ENCOUNTER — PHARMACY VISIT (OUTPATIENT)
Dept: PHARMACY | Facility: CLINIC | Age: 44
End: 2024-11-13
Payer: COMMERCIAL

## 2024-11-16 ENCOUNTER — LAB (OUTPATIENT)
Dept: LAB | Facility: LAB | Age: 44
End: 2024-11-16
Payer: COMMERCIAL

## 2024-11-16 DIAGNOSIS — I10 PRIMARY HYPERTENSION: ICD-10-CM

## 2024-11-16 LAB
ANION GAP SERPL CALCULATED.3IONS-SCNC: 12 MMOL/L (ref 10–20)
BUN SERPL-MCNC: 16 MG/DL (ref 6–23)
CALCIUM SERPL-MCNC: 9.2 MG/DL (ref 8.6–10.3)
CHLORIDE SERPL-SCNC: 106 MMOL/L (ref 98–107)
CHOLEST SERPL-MCNC: 139 MG/DL (ref 0–199)
CHOLEST/HDLC SERPL: 3.3 {RATIO}
CO2 SERPL-SCNC: 24 MMOL/L (ref 21–32)
CREAT SERPL-MCNC: 0.95 MG/DL (ref 0.5–1.05)
EGFRCR SERPLBLD CKD-EPI 2021: 75 ML/MIN/1.73M*2
GLUCOSE SERPL-MCNC: 129 MG/DL (ref 74–99)
HDLC SERPL-MCNC: 42.5 MG/DL
LDLC SERPL CALC-MCNC: 73 MG/DL
NON HDL CHOLESTEROL: 97 MG/DL (ref 0–149)
POTASSIUM SERPL-SCNC: 4.5 MMOL/L (ref 3.5–5.3)
SODIUM SERPL-SCNC: 137 MMOL/L (ref 136–145)
TRIGL SERPL-MCNC: 117 MG/DL (ref 0–149)
VLDL: 23 MG/DL (ref 0–40)

## 2024-11-16 PROCEDURE — 80048 BASIC METABOLIC PNL TOTAL CA: CPT

## 2024-11-16 PROCEDURE — 80061 LIPID PANEL: CPT

## 2024-11-16 PROCEDURE — 36415 COLL VENOUS BLD VENIPUNCTURE: CPT

## 2024-11-25 ENCOUNTER — PHARMACY VISIT (OUTPATIENT)
Dept: PHARMACY | Facility: CLINIC | Age: 44
End: 2024-11-25
Payer: COMMERCIAL

## 2024-11-25 PROCEDURE — RXMED WILLOW AMBULATORY MEDICATION CHARGE

## 2024-12-05 PROCEDURE — RXMED WILLOW AMBULATORY MEDICATION CHARGE

## 2024-12-11 ENCOUNTER — PATIENT MESSAGE (OUTPATIENT)
Dept: PRIMARY CARE | Facility: CLINIC | Age: 44
End: 2024-12-11
Payer: COMMERCIAL

## 2024-12-11 DIAGNOSIS — B37.9 YEAST INFECTION: Primary | ICD-10-CM

## 2024-12-12 ENCOUNTER — PHARMACY VISIT (OUTPATIENT)
Dept: PHARMACY | Facility: CLINIC | Age: 44
End: 2024-12-12
Payer: COMMERCIAL

## 2024-12-12 PROCEDURE — RXMED WILLOW AMBULATORY MEDICATION CHARGE

## 2024-12-12 RX ORDER — FLUCONAZOLE 150 MG/1
150 TABLET ORAL SEE ADMIN INSTRUCTIONS
Qty: 2 TABLET | Refills: 0 | Status: SHIPPED | OUTPATIENT
Start: 2024-12-12

## 2024-12-14 ENCOUNTER — APPOINTMENT (OUTPATIENT)
Dept: RADIOLOGY | Facility: HOSPITAL | Age: 44
End: 2024-12-14
Payer: COMMERCIAL

## 2024-12-14 ENCOUNTER — APPOINTMENT (OUTPATIENT)
Dept: CARDIOLOGY | Facility: HOSPITAL | Age: 44
End: 2024-12-14
Payer: COMMERCIAL

## 2024-12-14 ENCOUNTER — HOSPITAL ENCOUNTER (EMERGENCY)
Facility: HOSPITAL | Age: 44
Discharge: HOME | End: 2024-12-14
Payer: COMMERCIAL

## 2024-12-14 VITALS
OXYGEN SATURATION: 100 % | DIASTOLIC BLOOD PRESSURE: 113 MMHG | WEIGHT: 280 LBS | RESPIRATION RATE: 18 BRPM | BODY MASS INDEX: 42.44 KG/M2 | SYSTOLIC BLOOD PRESSURE: 219 MMHG | TEMPERATURE: 97 F | HEART RATE: 90 BPM | HEIGHT: 68 IN

## 2024-12-14 DIAGNOSIS — R11.14 BILIOUS VOMITING WITH NAUSEA: Primary | ICD-10-CM

## 2024-12-14 LAB
ALBUMIN SERPL BCP-MCNC: 4.6 G/DL (ref 3.4–5)
ALP SERPL-CCNC: 81 U/L (ref 33–110)
ALT SERPL W P-5'-P-CCNC: 14 U/L (ref 7–45)
ANION GAP SERPL CALCULATED.3IONS-SCNC: 22 MMOL/L (ref 10–20)
APPEARANCE UR: CLEAR
AST SERPL W P-5'-P-CCNC: 13 U/L (ref 9–39)
BACTERIA #/AREA URNS AUTO: ABNORMAL /HPF
BASOPHILS # BLD AUTO: 0.05 X10*3/UL (ref 0–0.1)
BASOPHILS NFR BLD AUTO: 0.3 %
BILIRUB SERPL-MCNC: 0.7 MG/DL (ref 0–1.2)
BILIRUB UR STRIP.AUTO-MCNC: NEGATIVE MG/DL
BUN SERPL-MCNC: 21 MG/DL (ref 6–23)
CALCIUM SERPL-MCNC: 9.7 MG/DL (ref 8.6–10.3)
CARDIAC TROPONIN I PNL SERPL HS: 16 NG/L (ref 0–13)
CARDIAC TROPONIN I PNL SERPL HS: 17 NG/L (ref 0–13)
CHLORIDE SERPL-SCNC: 102 MMOL/L (ref 98–107)
CO2 SERPL-SCNC: 14 MMOL/L (ref 21–32)
COLOR UR: ABNORMAL
CREAT SERPL-MCNC: 1.04 MG/DL (ref 0.5–1.05)
EGFRCR SERPLBLD CKD-EPI 2021: 68 ML/MIN/1.73M*2
EOSINOPHIL # BLD AUTO: 0.03 X10*3/UL (ref 0–0.7)
EOSINOPHIL NFR BLD AUTO: 0.2 %
ERYTHROCYTE [DISTWIDTH] IN BLOOD BY AUTOMATED COUNT: 16.3 % (ref 11.5–14.5)
GLUCOSE SERPL-MCNC: 212 MG/DL (ref 74–99)
GLUCOSE UR STRIP.AUTO-MCNC: ABNORMAL MG/DL
HCG UR QL IA.RAPID: NEGATIVE
HCT VFR BLD AUTO: 33 % (ref 36–46)
HGB BLD-MCNC: 9.7 G/DL (ref 12–16)
HOLD SPECIMEN: NORMAL
IMM GRANULOCYTES # BLD AUTO: 0.12 X10*3/UL (ref 0–0.7)
IMM GRANULOCYTES NFR BLD AUTO: 0.6 % (ref 0–0.9)
KETONES UR STRIP.AUTO-MCNC: ABNORMAL MG/DL
LACTATE SERPL-SCNC: 1.8 MMOL/L (ref 0.4–2)
LEUKOCYTE ESTERASE UR QL STRIP.AUTO: NEGATIVE
LIPASE SERPL-CCNC: 23 U/L (ref 9–82)
LYMPHOCYTES # BLD AUTO: 1.14 X10*3/UL (ref 1.2–4.8)
LYMPHOCYTES NFR BLD AUTO: 5.8 %
MAGNESIUM SERPL-MCNC: 1.84 MG/DL (ref 1.6–2.4)
MCH RBC QN AUTO: 23.2 PG (ref 26–34)
MCHC RBC AUTO-ENTMCNC: 29.4 G/DL (ref 32–36)
MCV RBC AUTO: 79 FL (ref 80–100)
MONOCYTES # BLD AUTO: 1.28 X10*3/UL (ref 0.1–1)
MONOCYTES NFR BLD AUTO: 6.5 %
MUCOUS THREADS #/AREA URNS AUTO: ABNORMAL /LPF
NEUTROPHILS # BLD AUTO: 16.98 X10*3/UL (ref 1.2–7.7)
NEUTROPHILS NFR BLD AUTO: 86.6 %
NITRITE UR QL STRIP.AUTO: NEGATIVE
NRBC BLD-RTO: 0 /100 WBCS (ref 0–0)
PH UR STRIP.AUTO: 5.5 [PH]
PLATELET # BLD AUTO: 765 X10*3/UL (ref 150–450)
POTASSIUM SERPL-SCNC: 4 MMOL/L (ref 3.5–5.3)
PROT SERPL-MCNC: 8.2 G/DL (ref 6.4–8.2)
PROT UR STRIP.AUTO-MCNC: ABNORMAL MG/DL
RBC # BLD AUTO: 4.18 X10*6/UL (ref 4–5.2)
RBC # UR STRIP.AUTO: ABNORMAL /UL
RBC #/AREA URNS AUTO: ABNORMAL /HPF
SODIUM SERPL-SCNC: 134 MMOL/L (ref 136–145)
SP GR UR STRIP.AUTO: 1.03
SQUAMOUS #/AREA URNS AUTO: ABNORMAL /HPF
TRANS CELLS #/AREA UR COMP ASSIST: ABNORMAL /HPF
UROBILINOGEN UR STRIP.AUTO-MCNC: NORMAL MG/DL
WBC # BLD AUTO: 19.6 X10*3/UL (ref 4.4–11.3)
WBC #/AREA URNS AUTO: ABNORMAL /HPF

## 2024-12-14 PROCEDURE — 83690 ASSAY OF LIPASE: CPT

## 2024-12-14 PROCEDURE — 81003 URINALYSIS AUTO W/O SCOPE: CPT

## 2024-12-14 PROCEDURE — 36415 COLL VENOUS BLD VENIPUNCTURE: CPT

## 2024-12-14 PROCEDURE — 83605 ASSAY OF LACTIC ACID: CPT

## 2024-12-14 PROCEDURE — 81025 URINE PREGNANCY TEST: CPT

## 2024-12-14 PROCEDURE — 93005 ELECTROCARDIOGRAM TRACING: CPT

## 2024-12-14 PROCEDURE — 84484 ASSAY OF TROPONIN QUANT: CPT

## 2024-12-14 PROCEDURE — 96374 THER/PROPH/DIAG INJ IV PUSH: CPT | Mod: 59

## 2024-12-14 PROCEDURE — 74177 CT ABD & PELVIS W/CONTRAST: CPT | Performed by: RADIOLOGY

## 2024-12-14 PROCEDURE — 2500000004 HC RX 250 GENERAL PHARMACY W/ HCPCS (ALT 636 FOR OP/ED)

## 2024-12-14 PROCEDURE — 2550000001 HC RX 255 CONTRASTS

## 2024-12-14 PROCEDURE — 96376 TX/PRO/DX INJ SAME DRUG ADON: CPT

## 2024-12-14 PROCEDURE — 99285 EMERGENCY DEPT VISIT HI MDM: CPT | Mod: 25

## 2024-12-14 PROCEDURE — 85025 COMPLETE CBC W/AUTO DIFF WBC: CPT

## 2024-12-14 PROCEDURE — 83735 ASSAY OF MAGNESIUM: CPT

## 2024-12-14 PROCEDURE — 80053 COMPREHEN METABOLIC PANEL: CPT

## 2024-12-14 PROCEDURE — 74177 CT ABD & PELVIS W/CONTRAST: CPT

## 2024-12-14 PROCEDURE — 87075 CULTR BACTERIA EXCEPT BLOOD: CPT | Mod: WESLAB

## 2024-12-14 PROCEDURE — 96361 HYDRATE IV INFUSION ADD-ON: CPT

## 2024-12-14 RX ORDER — ONDANSETRON HYDROCHLORIDE 2 MG/ML
4 INJECTION, SOLUTION INTRAVENOUS ONCE
Status: COMPLETED | OUTPATIENT
Start: 2024-12-14 | End: 2024-12-14

## 2024-12-14 RX ORDER — ONDANSETRON 4 MG/1
4 TABLET, ORALLY DISINTEGRATING ORAL EVERY 8 HOURS PRN
Qty: 20 TABLET | Refills: 0 | Status: SHIPPED | OUTPATIENT
Start: 2024-12-14 | End: 2024-12-21

## 2024-12-14 RX ORDER — ONDANSETRON 4 MG/1
4 TABLET, FILM COATED ORAL EVERY 6 HOURS
Qty: 12 TABLET | Refills: 0 | Status: CANCELLED | OUTPATIENT
Start: 2024-12-14 | End: 2024-12-17

## 2024-12-14 RX ADMIN — SODIUM CHLORIDE 1000 ML: 900 INJECTION, SOLUTION INTRAVENOUS at 14:46

## 2024-12-14 RX ADMIN — ONDANSETRON 4 MG: 2 INJECTION INTRAMUSCULAR; INTRAVENOUS at 14:47

## 2024-12-14 RX ADMIN — ONDANSETRON 4 MG: 2 INJECTION INTRAMUSCULAR; INTRAVENOUS at 18:16

## 2024-12-14 RX ADMIN — IOHEXOL 75 ML: 350 INJECTION, SOLUTION INTRAVENOUS at 17:40

## 2024-12-14 ASSESSMENT — PAIN SCALES - GENERAL: PAINLEVEL_OUTOF10: 3

## 2024-12-14 ASSESSMENT — COLUMBIA-SUICIDE SEVERITY RATING SCALE - C-SSRS
1. IN THE PAST MONTH, HAVE YOU WISHED YOU WERE DEAD OR WISHED YOU COULD GO TO SLEEP AND NOT WAKE UP?: NO
2. HAVE YOU ACTUALLY HAD ANY THOUGHTS OF KILLING YOURSELF?: NO
6. HAVE YOU EVER DONE ANYTHING, STARTED TO DO ANYTHING, OR PREPARED TO DO ANYTHING TO END YOUR LIFE?: NO

## 2024-12-14 ASSESSMENT — PAIN - FUNCTIONAL ASSESSMENT: PAIN_FUNCTIONAL_ASSESSMENT: 0-10

## 2024-12-14 ASSESSMENT — PAIN DESCRIPTION - LOCATION: LOCATION: ABDOMEN

## 2024-12-14 NOTE — Clinical Note
Josefina Huerta was seen and treated in our emergency department on 12/14/2024.  She may return to work on 12/18/2024.       If you have any questions or concerns, please don't hesitate to call.      Kan Dunaway PA-C

## 2024-12-15 LAB
BACTERIA BLD CULT: NORMAL
BACTERIA BLD CULT: NORMAL
HOLD SPECIMEN: NORMAL

## 2024-12-15 NOTE — ED PROVIDER NOTES
HPI   Chief Complaint   Patient presents with    Vomiting     Pt took diflucan for yeast infection Thursday and has had vomiting since. Pt states this has happened before after taking diflucan.        HPI  Patient is a 44-year-old female who presents to ED for chief complaint of nausea and vomiting x 3 days.  Patient had similar symptoms after taking Diflucan for a yeast infection in the past.  Patient states she recently took Diflucan and is now having this vomiting.  She denies any abdominal pain when she is not vomiting.  She denies any chest pain or shortness of breath.  No history of abdominal surgeries.  States last bowel movement was 2 days ago but also that she has not been eating for the last few days.  She reports bilious emesis.  Denies any recent hospitalizations or surgeries.  No recent travel or sick contacts.  No blood thinner use.  Denies any fever or chills.      Patient History   Past Medical History:   Diagnosis Date    Diabetes mellitus (Multi)     TYPE @    Hypertension     Non-STEMI (non-ST elevated myocardial infarction) (Multi)      Past Surgical History:   Procedure Laterality Date    CARDIAC CATHETERIZATION N/A 8/26/2024    Procedure: Left Heart Cath;  Surgeon: Art Rice DO;  Location: [facility] Cardiac Cath Lab;  Service: Cardiovascular;  Laterality: N/A;     Family History   Problem Relation Name Age of Onset    Heart attack Father       Social History     Tobacco Use    Smoking status: Never    Smokeless tobacco: Never   Vaping Use    Vaping status: Never Used   Substance Use Topics    Alcohol use: Yes     Comment: rare    Drug use: Yes     Types: Marijuana, Other     Comment: Gummies/ rarely/ for sleep       Physical Exam   ED Triage Vitals [12/14/24 1415]   Temperature Heart Rate Respirations BP   36.1 °C (97 °F) (!) 109 18 (!) 175/103      Pulse Ox Temp src Heart Rate Source Patient Position   98 % -- -- Sitting      BP Location FiO2 (%)     Left arm --       Physical Exam  Vitals  reviewed.   Constitutional:       General: She is not in acute distress.     Appearance: Normal appearance. She is ill-appearing.   HENT:      Head: Normocephalic and atraumatic.   Eyes:      Extraocular Movements: Extraocular movements intact.   Cardiovascular:      Rate and Rhythm: Normal rate and regular rhythm.      Heart sounds: Normal heart sounds.   Pulmonary:      Effort: Pulmonary effort is normal.      Breath sounds: Normal breath sounds.   Abdominal:      General: Abdomen is flat. There is no distension.      Palpations: Abdomen is soft.      Tenderness: There is no abdominal tenderness.   Musculoskeletal:         General: Normal range of motion.      Cervical back: Normal range of motion and neck supple.   Skin:     Coloration: Skin is pale.   Neurological:      Mental Status: She is alert and oriented to person, place, and time.   Psychiatric:         Mood and Affect: Mood normal.         Behavior: Behavior normal.           ED Course & MDM   ED Course as of 12/15/24 1315   Sat Dec 14, 2024   1450 EKG interpreted by myself independently, EKG shows sinus tachycardia, rate of 118 bpm, OR interval 154, QRS 88, , QTc 473, patient has no ST elevation or depression, negative for acute MI. [VIANEY]      ED Course User Index  [VIANEY] Leo Deal, DO         Diagnoses as of 12/15/24 1315   Bilious vomiting with nausea                 No data recorded     Anam Coma Scale Score: 15 (12/14/24 1413 : Lana Rendon RN)                           Medical Decision Making  Parts of this chart have been completed using voice recognition software. Please excuse any errors of transcription.  My thought process and reason for plan has been formulated from the time that I saw the patient until the time of disposition and is not specific to one specific moment during their visit and furthermore my MDM encompasses this entire chart and not only this text box.    HPI:   A medically appropriate HPI was obtained, outlined  above.    Josefina Huerta is a  44 y.o. female    Chief Complaint   Patient presents with    Vomiting     Pt took diflucan for yeast infection Thursday and has had vomiting since. Pt states this has happened before after taking diflucan.        Past Medical History:   Diagnosis Date    Diabetes mellitus (Multi)     TYPE @    Hypertension     Non-STEMI (non-ST elevated myocardial infarction) (Multi)        Past Surgical History:   Procedure Laterality Date    CARDIAC CATHETERIZATION N/A 8/26/2024    Procedure: Left Heart Cath;  Surgeon: Art Rice DO;  Location: [facility] Cardiac Cath Lab;  Service: Cardiovascular;  Laterality: N/A;       Social History     Tobacco Use    Smoking status: Never    Smokeless tobacco: Never   Vaping Use    Vaping status: Never Used   Substance Use Topics    Alcohol use: Yes     Comment: rare    Drug use: Yes     Types: Marijuana, Other     Comment: Gummies/ rarely/ for sleep       Family History   Problem Relation Name Age of Onset    Heart attack Father         No Known Allergies    Current Outpatient Medications   Medication Instructions    apixaban (Eliquis) 5 mg (74 tabs) tablet Take 2 tablets (10 mg) by mouth 2 times a day for 7 days, then take 1 tablet (5 mg) by mouth 2 times a day.    atorvastatin (LIPITOR) 40 mg, oral, Daily    carvedilol (COREG) 6.25 mg, oral, 2 times daily    Eliquis 5 mg, oral, 2 times daily    Farxiga 10 mg, oral, Daily    fluconazole (Diflucan) 150 mg tablet Take 1 tablet (150 mg) by mouth once; may take second 150 mg tablet after 72 hours if symptoms persist    losartan (COZAAR) 50 mg, oral, 2 times daily    metFORMIN (GLUCOPHAGE) 1,000 mg, oral, 2 times daily    miscellaneous medical supply (Blood Pressure Cuff) misc 1 Device, miscellaneous, Daily    ondansetron ODT (Zofran-ODT) 4 mg disintegrating tablet Dissolve 1 tablet (4 mg) on top of the tongue every 8 hours if needed for nausea or vomiting.    ondansetron ODT (ZOFRAN-ODT) 4 mg, oral, Every 8 hours  "PRN    prochlorperazine (COMPAZINE) 10 mg, oral, Every 6 hours PRN    spironolactone (ALDACTONE) 25 mg, oral, Daily       History obtained from:   Patient    Exam:   Patient Vitals for the past 24 hrs:   BP Temp Pulse Resp SpO2 Height Weight   12/14/24 1815 (!) 219/113 -- 90 18 100 % -- --   12/14/24 1415 (!) 175/103 36.1 °C (97 °F) (!) 109 18 98 % 1.727 m (5' 8\") 127 kg (280 lb)       A medically appropriate exam performed, outlined above, given the known history and presentation.    EKG/Cardiac monitor:   Interpreted by attending physician, see their note for ED course for more detail.    Social Determinants of Health considered during this visit:   Housing, Family or social support    Medications given during visit:  Medications   sodium chloride 0.9 % bolus 1,000 mL (0 mL intravenous Stopped 12/14/24 1815)   ondansetron (Zofran) injection 4 mg (4 mg intravenous Given 12/14/24 1447)   iohexol (OMNIPaque) 350 mg iodine/mL solution 75 mL (75 mL intravenous Given 12/14/24 1740)   ondansetron (Zofran) injection 4 mg (4 mg intravenous Given 12/14/24 1816)        Diagnostic/tests:  Labs Reviewed   CBC WITH AUTO DIFFERENTIAL - Abnormal       Result Value    WBC 19.6 (*)     nRBC 0.0      RBC 4.18      Hemoglobin 9.7 (*)     Hematocrit 33.0 (*)     MCV 79 (*)     MCH 23.2 (*)     MCHC 29.4 (*)     RDW 16.3 (*)     Platelets 765 (*)     Neutrophils % 86.6      Immature Granulocytes %, Automated 0.6      Lymphocytes % 5.8      Monocytes % 6.5      Eosinophils % 0.2      Basophils % 0.3      Neutrophils Absolute 16.98 (*)     Immature Granulocytes Absolute, Automated 0.12      Lymphocytes Absolute 1.14 (*)     Monocytes Absolute 1.28 (*)     Eosinophils Absolute 0.03      Basophils Absolute 0.05     COMPREHENSIVE METABOLIC PANEL - Abnormal    Glucose 212 (*)     Sodium 134 (*)     Potassium 4.0      Chloride 102      Bicarbonate 14 (*)     Anion Gap 22 (*)     Urea Nitrogen 21      Creatinine 1.04      eGFR 68      Calcium " 9.7      Albumin 4.6      Alkaline Phosphatase 81      Total Protein 8.2      AST 13      Bilirubin, Total 0.7      ALT 14     TROPONIN I, HIGH SENSITIVITY - Abnormal    Troponin I, High Sensitivity 16 (*)     Narrative:     Less than 99th percentile of normal range cutoff-  Female and children under 18 years old <14 ng/L; Male <21 ng/L: Negative  Repeat testing should be performed if clinically indicated.     Female and children under 18 years old 14-50 ng/L; Male 21-50 ng/L:  Consistent with possible cardiac damage and possible increased clinical   risk. Serial measurements may help to assess extent of myocardial damage.     >50 ng/L: Consistent with cardiac damage, increased clinical risk and  myocardial infarction. Serial measurements may help assess extent of   myocardial damage.      NOTE: Children less than 1 year old may have higher baseline troponin   levels and results should be interpreted in conjunction with the overall   clinical context.     NOTE: Troponin I testing is performed using a different   testing methodology at [facility] than at other   [facility]. Direct result comparisons should only   be made within the same method.   URINALYSIS WITH REFLEX CULTURE AND MICROSCOPIC - Abnormal    Color, Urine Light-Yellow      Appearance, Urine Clear      Specific Gravity, Urine 1.026      pH, Urine 5.5      Protein, Urine 100 (2+) (*)     Glucose, Urine OVER (4+) (*)     Blood, Urine 0.06 (1+) (*)     Ketones, Urine 150 (4+) (*)     Bilirubin, Urine NEGATIVE      Urobilinogen, Urine Normal      Nitrite, Urine NEGATIVE      Leukocyte Esterase, Urine NEGATIVE      Narrative:     OVER is reported when the result is greater than the clinically reportable range.   SERIAL TROPONIN-INITIAL - Abnormal    Troponin I, High Sensitivity 17 (*)     Narrative:     Less than 99th percentile of normal range cutoff-  Female and children under 18 years old <14 ng/L; Male <21 ng/L: Negative  Repeat  testing should be performed if clinically indicated.     Female and children under 18 years old 14-50 ng/L; Male 21-50 ng/L:  Consistent with possible cardiac damage and possible increased clinical   risk. Serial measurements may help to assess extent of myocardial damage.     >50 ng/L: Consistent with cardiac damage, increased clinical risk and  myocardial infarction. Serial measurements may help assess extent of   myocardial damage.      NOTE: Children less than 1 year old may have higher baseline troponin   levels and results should be interpreted in conjunction with the overall   clinical context.     NOTE: Troponin I testing is performed using a different   testing methodology at [facility] than at other   [facility]. Direct result comparisons should only   be made within the same method.   URINALYSIS MICROSCOPIC WITH REFLEX CULTURE - Abnormal    WBC, Urine NONE      RBC, Urine 3-5      Squamous Epithelial Cells, Urine 1-9 (SPARSE)      Transitional Epithelial Cells, Urine 1-2 (FEW)      Bacteria, Urine 1+ (*)     Mucus, Urine 1+     MAGNESIUM - Normal    Magnesium 1.84     HCG, URINE, QUALITATIVE - Normal    HCG, Urine NEGATIVE     LIPASE - Normal    Lipase 23      Narrative:     Venipuncture immediately after or during the administration of Metamizole may lead to falsely low results. Testing should be performed immediately prior to Metamizole dosing.   LACTATE - Normal    Lactate 1.8      Narrative:     Venipuncture immediately after or during the administration of Metamizole may lead to falsely low results. Testing should be performed immediately prior to Metamizole dosing.   BLOOD CULTURE   BLOOD CULTURE   URINALYSIS WITH REFLEX CULTURE AND MICROSCOPIC    Narrative:     The following orders were created for panel order Urinalysis with Reflex Culture and Microscopic.  Procedure                               Abnormality         Status                     ---------                                -----------         ------                     Urinalysis with Reflex C...[[id number]]  Abnormal            Final result               Extra Urine Gray Tube[[id number]]                            Final result                 Please view results for these tests on the individual orders.   EXTRA URINE GRAY TUBE    Extra Tube Hold for add-ons.     TROPONIN SERIES- (INITIAL, 1 HR)    Narrative:     The following orders were created for panel order Troponin I Series, High Sensitivity (0, 1 HR).  Procedure                               Abnormality         Status                     ---------                               -----------         ------                     Troponin I, High Sensiti...[[id number]]  Abnormal            Final result               Troponin, High Sensitivi...[[id number]]                                                   Please view results for these tests on the individual orders.        CT abdomen pelvis w IV contrast   Final Result   1.  No significant acute abnormalities.   2. Similar appearance of the kidneys including hypodensity left renal   sinus measuring greater density than simple fluid potentially a   peripelvic cysts containing debris although solid process not   reliably excluded. Recommend further characterization with MRI with   and without contrast kidney protocol.   3. Similar marginated left adrenal nodule potentially adenoma   although incompletely characterized. Attention recommended on   follow-up assessment.             MACRO:   None        Signed by: Soham Villatoro 12/14/2024 6:27 PM   Dictation workstation:   [id number]           [facility] Summary:  Patient has a leukocytosis of 19, suspect dehydration or reactivity to viral illness.  Elevated glucose and anion gap, decreased bicarb of 14, I suspect this is secondary to frequent vomiting.  CT abdomen pelvis shows no acute findings.  Discussed findings with patient, she is agreeable to discharge home today with strict return  precautions.  She will return to the ED in in 1 to 2 days if symptoms or not improving or if symptoms are getting worse.    We have discussed the diagnosis and risks, and we agree with discharging home to follow-up with appropriate physician as directed. We also discussed returning to the Emergency Department immediately if new or worsening symptoms occur. We have discussed the symptoms which are most concerning that necessitate immediate return. Pt symptoms have been well controlled here and the patient is safe for discharge with appropriate outpatient follow up. The patient has verbalized understanding to return to ER without delay for new or worsening pains or for any other symptoms or concerns. I utilized the discharge clinical management tool provided [employer] to help estimate risk of negative outcome for this patient.      Disposition:  ED Prescriptions       Medication Sig Dispense Start Date End Date Auth. Provider    ondansetron ODT (Zofran-ODT) 4 mg disintegrating tablet Dissolve 1 tablet (4 mg) in the mouth every 8 hours if needed for nausea or vomiting for up to 7 days. 20 tablet 12/14/2024 12/21/2024 Kan Dunaway PA-C            Procedure  Procedures     Kan Dunaway PA-C  12/15/24 1323

## 2024-12-16 LAB
ATRIAL RATE: 118 BPM
P AXIS: 57 DEGREES
P OFFSET: 192 MS
P ONSET: 137 MS
PR INTERVAL: 154 MS
Q ONSET: 214 MS
QRS COUNT: 19 BEATS
QRS DURATION: 88 MS
QT INTERVAL: 338 MS
QTC CALCULATION(BAZETT): 473 MS
QTC FREDERICIA: 423 MS
R AXIS: -75 DEGREES
T AXIS: 60 DEGREES
T OFFSET: 383 MS
VENTRICULAR RATE: 118 BPM

## 2024-12-16 NOTE — PROGRESS NOTES
Outpatient Visit Note    No chief complaint on file.        HPI:  Josefina Huerta is a 44 y.o. female who presents to the office for ER follow-up regarding nausea/vomiting.  She was last seen in the office in September for blood-pressure follow-up.    Of note, patient contacted office on 12/11/2024 reporting possible yeast infection.  Stated to have slight abdominal pain with nausea the day prior.  Did have 1 episode of vomiting.  Noted similar is to be the same with her last yeast infection to which Diflucan was sent ultimately, patient presented to the emergency department on 12/14/2024 stating to have nausea and vomiting for 3 days.  Stated to have had similar symptoms after taking Diflucan in the past.  She denies any abdominal pain, chest pain, shortness of breath or history of abdominal surgeries.  Stated that her last bowel movement had been 2 days prior though her intake has been low.  No reported fever or chills.  EKG was performed which showed sinus tachycardia.  Blood pressure was significantly elevated at 219/113.  Blood work was completed showing elevated white blood cell, with hyperglycemia mild hyponatremia and a mildly elevated troponin.  Urinalysis showed prominent glucosuria with blood and ketones.  CT abdomen/pelvis was completed showing no acute abnormalities beyond chronic left renal hypodensity and left adrenal nodule.  Patient was given IV fluids, IV Zofran.  Ultimately patient was discharged home with oral Zofran and recommendation for follow-up.    She reports ____    Current Medications  Current Outpatient Medications   Medication Instructions    apixaban (Eliquis) 5 mg (74 tabs) tablet Take 2 tablets (10 mg) by mouth 2 times a day for 7 days, then take 1 tablet (5 mg) by mouth 2 times a day.    atorvastatin (LIPITOR) 40 mg, oral, Daily    carvedilol (COREG) 6.25 mg, oral, 2 times daily    Eliquis 5 mg, oral, 2 times daily    Farxiga 10 mg, oral, Daily    fluconazole (Diflucan) 150  mg tablet Take 1 tablet (150 mg) by mouth once; may take second 150 mg tablet after 72 hours if symptoms persist    losartan (COZAAR) 50 mg, oral, 2 times daily    metFORMIN (GLUCOPHAGE) 1,000 mg, oral, 2 times daily    miscellaneous medical supply (Blood Pressure Cuff) misc 1 Device, miscellaneous, Daily    ondansetron ODT (Zofran-ODT) 4 mg disintegrating tablet Dissolve 1 tablet (4 mg) on top of the tongue every 8 hours if needed for nausea or vomiting.    ondansetron ODT (ZOFRAN-ODT) 4 mg, oral, Every 8 hours PRN    prochlorperazine (COMPAZINE) 10 mg, oral, Every 6 hours PRN    spironolactone (ALDACTONE) 25 mg, oral, Daily        Allergies  No Known Allergies     Past Medical History:   Diagnosis Date    Diabetes mellitus (Multi)     TYPE @    Hypertension     Non-STEMI (non-ST elevated myocardial infarction) (Multi)       Past Surgical History:   Procedure Laterality Date    CARDIAC CATHETERIZATION N/A 8/26/2024    Procedure: Left Heart Cath;  Surgeon: Art Rice DO;  Location: [facility] Cardiac Cath Lab;  Service: Cardiovascular;  Laterality: N/A;     Family History   Problem Relation Name Age of Onset    Heart attack Father       Social History     Tobacco Use    Smoking status: Never    Smokeless tobacco: Never   Vaping Use    Vaping status: Never Used   Substance Use Topics    Alcohol use: Yes     Comment: rare    Drug use: Yes     Types: Marijuana, Other     Comment: Gummies/ rarely/ for sleep       ROS  All pertinent positive symptoms are included in the history of present illness.  All other systems have been reviewed and are negative and noncontributory to this patient's current ailments.    VITAL SIGNS  There were no vitals filed for this visit.        PHYSICAL EXAM  GENERAL APPEARANCE: alert and oriented, Pleasant and cooperative, No Acute Distress  HEENT: EOMI, PERRLA, MMM  HEART: RRR, normal S1S2, no murmurs, click or rubs  LUNGS: clear to auscultation bilaterally, no  wheezes/rhonchi/rales  EXTREMITIES: no edema, normal ROM  SKIN: normal, no rash, unremarkable  NEUROLOGIC EXAM: non-focal exam  MUSCULOSKELETAL: no gross abnormalities  PSYCH: affect is normal, eye contact is good      Assessment/Plan   Problem List Items Addressed This Visit    None          Counseling:       Medication education:         Education:  The patient is counseled regarding potential side-effects of all new medications        Understanding:  Patient expressed understanding        Adherence:  No barriers to adherence identified    ** Please excuse any errors in grammar or translation related to this dictation. Voice recognition software was utilized to prepare this document. **

## 2024-12-17 ENCOUNTER — OFFICE VISIT (OUTPATIENT)
Dept: PRIMARY CARE | Facility: CLINIC | Age: 44
End: 2024-12-17
Payer: COMMERCIAL

## 2024-12-17 ENCOUNTER — APPOINTMENT (OUTPATIENT)
Dept: PRIMARY CARE | Facility: CLINIC | Age: 44
End: 2024-12-17
Payer: COMMERCIAL

## 2024-12-17 VITALS
WEIGHT: 267 LBS | HEART RATE: 98 BPM | TEMPERATURE: 98 F | OXYGEN SATURATION: 99 % | RESPIRATION RATE: 16 BRPM | SYSTOLIC BLOOD PRESSURE: 118 MMHG | DIASTOLIC BLOOD PRESSURE: 72 MMHG | BODY MASS INDEX: 40.6 KG/M2

## 2024-12-17 DIAGNOSIS — E11.10 TYPE 2 DIABETES MELLITUS WITH KETOACIDOSIS WITHOUT COMA, WITHOUT LONG-TERM CURRENT USE OF INSULIN: ICD-10-CM

## 2024-12-17 DIAGNOSIS — I51.81 TAKOTSUBO CARDIOMYOPATHY: ICD-10-CM

## 2024-12-17 DIAGNOSIS — I10 PRIMARY HYPERTENSION: ICD-10-CM

## 2024-12-17 DIAGNOSIS — R11.2 NAUSEA AND VOMITING, UNSPECIFIED VOMITING TYPE: Primary | ICD-10-CM

## 2024-12-17 PROBLEM — N17.9 AKI (ACUTE KIDNEY INJURY) (CMS-HCC): Status: RESOLVED | Noted: 2024-09-19 | Resolved: 2024-12-17

## 2024-12-17 PROCEDURE — 3046F HEMOGLOBIN A1C LEVEL >9.0%: CPT | Performed by: FAMILY MEDICINE

## 2024-12-17 PROCEDURE — 1036F TOBACCO NON-USER: CPT | Performed by: FAMILY MEDICINE

## 2024-12-17 PROCEDURE — 99214 OFFICE O/P EST MOD 30 MIN: CPT | Performed by: FAMILY MEDICINE

## 2024-12-17 PROCEDURE — 3074F SYST BP LT 130 MM HG: CPT | Performed by: FAMILY MEDICINE

## 2024-12-17 PROCEDURE — RXMED WILLOW AMBULATORY MEDICATION CHARGE

## 2024-12-17 PROCEDURE — 3048F LDL-C <100 MG/DL: CPT | Performed by: FAMILY MEDICINE

## 2024-12-17 PROCEDURE — 4010F ACE/ARB THERAPY RXD/TAKEN: CPT | Performed by: FAMILY MEDICINE

## 2024-12-17 PROCEDURE — 3078F DIAST BP <80 MM HG: CPT | Performed by: FAMILY MEDICINE

## 2024-12-17 RX ORDER — PROMETHAZINE HYDROCHLORIDE 12.5 MG/1
12.5 TABLET ORAL EVERY 6 HOURS PRN
Qty: 30 TABLET | Refills: 0 | Status: SHIPPED | OUTPATIENT
Start: 2024-12-17

## 2024-12-17 ASSESSMENT — PATIENT HEALTH QUESTIONNAIRE - PHQ9
1. LITTLE INTEREST OR PLEASURE IN DOING THINGS: NOT AT ALL
2. FEELING DOWN, DEPRESSED OR HOPELESS: NOT AT ALL
SUM OF ALL RESPONSES TO PHQ9 QUESTIONS 1 & 2: 0

## 2024-12-17 ASSESSMENT — PAIN SCALES - GENERAL: PAINLEVEL_OUTOF10: 0-NO PAIN

## 2024-12-17 NOTE — PATIENT INSTRUCTIONS
Problem List Items Addressed This Visit             ICD-10-CM    Nausea and vomiting, unspecified vomiting type - Primary R11.2     - Emergency department documentation reviewed and discussed  -It is likely that your symptoms are related to an underlying viral illness as this has been going around recently is likely that your symptoms  -Continue to focus on good daily hydration with small frequent meals that we gradually progressed as tolerated  -Will send alternative antinausea medication to trial         Relevant Medications    promethazine (Phenergan) 12.5 mg tablet    Other Relevant Orders    Comprehensive metabolic panel    Type 2 diabetes mellitus with ketoacidosis without coma, without long-term current use of insulin E11.10     - Will plan to monitor A1c sugar average; please complete anytime after 12/25/2024  -Recurrent yeast infections likely secondary to Farxiga to which we will monitor improvement on sugar average with plans to potentially initiate alternative sugar management medications         Relevant Orders    Hemoglobin A1c    Primary hypertension I10     - Blood pressure stable in office today though better than has been running recently  -Will continue with compliance of current regimen along with routine cardiology follow-up as scheduled  -Advocated focus on healthy, balanced diet with moderation of salt/caffeine         Relevant Orders    Comprehensive metabolic panel    Takotsubo cardiomyopathy I51.81     - Continue with medication regimen and routine cardiology follow-up/management as guided by specialist team                Counseling:       Medication education:         Education:  The patient is counseled regarding potential side-effects of all new medications        Understanding:  Patient expressed understanding        Adherence:  No barriers to adherence identified    ** Please excuse any errors in grammar or translation related to this dictation. Voice recognition software was utilized  to prepare this document. **

## 2024-12-17 NOTE — LETTER
December 17, 2024     Patient: Josefina Huerta   YOB: 1980   Date of Visit: 12/17/2024       To Whom It May Concern:    Josefina Huerta was seen in my clinic on 12/17/2024 for ER follow-up regarding recent illness. Please excuse Josefina for her absence from work with tentative return set for 12/19/2024 barring symptom improvement.    If you have any questions or concerns, please don't hesitate to call.         Sincerely,         Blake Donovan, DO

## 2024-12-17 NOTE — ASSESSMENT & PLAN NOTE
- Emergency department documentation reviewed and discussed  -It is likely that your symptoms are related to an underlying viral illness as this has been going around recently is likely that your symptoms  -Continue to focus on good daily hydration with small frequent meals that we gradually progressed as tolerated  -Will send alternative antinausea medication to trial

## 2024-12-17 NOTE — PROGRESS NOTES
Outpatient Visit Note    Chief Complaint   Patient presents with    ER Follow-up         HPI:  Josefina Huerta is a 44 y.o. female who presents to the office for ER follow-up regarding nausea/vomiting.  She was last seen in the office in September for blood-pressure follow-up.    Of note, patient contacted office on 12/11/2024 reporting possible yeast infection.  Stated to have slight abdominal pain with nausea the day prior.  Did have 1 episode of vomiting.  Noted similar is to be the same with her last yeast infection to which Diflucan was sent ultimately, patient presented to the emergency department on 12/14/2024 stating to have nausea and vomiting for 3 days.  Stated to have had similar symptoms after taking Diflucan in the past.  She denies any abdominal pain, chest pain, shortness of breath or history of abdominal surgeries.  Stated that her last bowel movement had been 2 days prior though her intake has been low.  No reported fever or chills.  EKG was performed which showed sinus tachycardia.  Blood pressure was significantly elevated at 219/113.  Blood work was completed showing elevated white blood cell, with hyperglycemia, mild hyponatremia and a mildly elevated troponin.  Urinalysis showed prominent glucosuria with blood and ketones.  CT abdomen/pelvis was completed showing no acute abnormalities beyond chronic left renal hypodensity and left adrenal nodule.  Patient was given IV fluids, IV Zofran.  Ultimately patient was discharged home with oral Zofran and recommendation for follow-up.    She reports no significant symptom improvement when given Zofran in the emergency department.  States that there was issues with her IV fluids to which she was only given half a bag.  Ultimately with her evaluation, she was discharged home.  Has continued to struggle with nausea though she states her last vomiting episode was 1 day ago.  Has been able to introduce small amounts of foods and restarted her  medication regimen over the last 2 days.  Does have generalized fatigue and occasional sensations of disequilibrium, particularly if she changes positions too quickly.  No reports of headache, vision changes, sinus pressure, sore throat, cough/congestion, chest pain, shortness of breath, abdominal pain, diarrhea or constipation beyond decreased frequency of bowel movements secondary to limited intake.    Reiterates that symptoms started abruptly at work but she denies any overt sick contacts.  Had similar symptoms with prior yeast infection which led her to believe this was related to her symptoms though she denies any acute complaints of discharge or urinary complaints.    Current Medications  Current Outpatient Medications   Medication Instructions    atorvastatin (LIPITOR) 40 mg, oral, Daily    carvedilol (COREG) 6.25 mg, oral, 2 times daily    Eliquis 5 mg, oral, 2 times daily    Farxiga 10 mg, oral, Daily    fluconazole (Diflucan) 150 mg tablet Take 1 tablet (150 mg) by mouth once; may take second 150 mg tablet after 72 hours if symptoms persist    losartan (COZAAR) 50 mg, oral, 2 times daily    metFORMIN (GLUCOPHAGE) 1,000 mg, oral, 2 times daily    miscellaneous medical supply (Blood Pressure Cuff) misc 1 Device, miscellaneous, Daily    ondansetron ODT (ZOFRAN-ODT) 4 mg, oral, Every 8 hours PRN    prochlorperazine (COMPAZINE) 10 mg, oral, Every 6 hours PRN    promethazine (PHENERGAN) 12.5 mg, oral, Every 6 hours PRN    spironolactone (ALDACTONE) 25 mg, oral, Daily        Allergies  No Known Allergies     Past Medical History:   Diagnosis Date    Diabetes mellitus (Multi)     TYPE @    Hypertension     Non-STEMI (non-ST elevated myocardial infarction) (Multi)       Past Surgical History:   Procedure Laterality Date    CARDIAC CATHETERIZATION N/A 8/26/2024    Procedure: Left Heart Cath;  Surgeon: Art Rice DO;  Location: [facility] Cardiac [facility];  Service: Cardiovascular;  Laterality: N/A;     Family History    Problem Relation Name Age of Onset    Heart attack Father       Social History     Tobacco Use    Smoking status: Never    Smokeless tobacco: Never   Vaping Use    Vaping status: Never Used   Substance Use Topics    Alcohol use: Yes     Comment: rare    Drug use: Yes     Types: Marijuana, Other     Comment: Gummies/ rarely/ for sleep       ROS  All pertinent positive symptoms are included in the history of present illness.  All other systems have been reviewed and are negative and noncontributory to this patient's current ailments.    VITAL SIGNS  Vitals:    12/17/24 0941   BP: 118/72   Pulse: 98   Resp: 16   Temp: 36.7 °C (98 °F)   SpO2: 99%       PHYSICAL EXAM  GENERAL APPEARANCE: alert and oriented, Pleasant and cooperative, No Acute Distress  HEENT: EOMI, PERRLA, MMM  HEART: RRR, normal S1S2, no murmurs, click or rubs  LUNGS: clear to auscultation bilaterally, no wheezes/rhonchi/rales  ABDOMEN: soft, non-tender, no organomegaly, no masses palpated  EXTREMITIES: no edema, normal ROM  SKIN: normal, no rash, unremarkable  NEUROLOGIC EXAM: non-focal exam  MUSCULOSKELETAL: no gross abnormalities  PSYCH: affect is normal, eye contact is good      Assessment/Plan   Problem List Items Addressed This Visit             ICD-10-CM    Nausea and vomiting, unspecified vomiting type - Primary R11.2     - Emergency department documentation reviewed and discussed  -It is likely that your symptoms are related to an underlying viral illness as this has been going around recently is likely that your symptoms  -Continue to focus on good daily hydration with small frequent meals that we gradually progressed as tolerated  -Will send alternative antinausea medication to trial         Relevant Medications    promethazine (Phenergan) 12.5 mg tablet    Other Relevant Orders    Comprehensive metabolic panel    Type 2 diabetes mellitus with ketoacidosis without coma, without long-term current use of insulin E11.10     - Will plan to  monitor A1c sugar average; please complete anytime after 12/25/2024  -Recurrent yeast infections likely secondary to Farxiga to which we will monitor improvement on sugar average with plans to potentially initiate alternative sugar management medications         Relevant Orders    Hemoglobin A1c    Primary hypertension I10     - Blood pressure stable in office today though better than has been running recently  -Will continue with compliance of current regimen along with routine cardiology follow-up as scheduled  -Advocated focus on healthy, balanced diet with moderation of salt/caffeine         Relevant Orders    Comprehensive metabolic panel    Takotsubo cardiomyopathy I51.81     - Continue with medication regimen and routine cardiology follow-up/management as guided by specialist team            Counseling:       Medication education:         Education:  The patient is counseled regarding potential side-effects of all new medications        Understanding:  Patient expressed understanding        Adherence:  No barriers to adherence identified    ** Please excuse any errors in grammar or translation related to this dictation. Voice recognition software was utilized to prepare this document. **

## 2024-12-17 NOTE — ASSESSMENT & PLAN NOTE
- Continue with medication regimen and routine cardiology follow-up/management as guided by specialist team

## 2024-12-17 NOTE — ASSESSMENT & PLAN NOTE
- Blood pressure stable in office today though better than has been running recently  -Will continue with compliance of current regimen along with routine cardiology follow-up as scheduled  -Advocated focus on healthy, balanced diet with moderation of salt/caffeine

## 2024-12-17 NOTE — ASSESSMENT & PLAN NOTE
- Will plan to monitor A1c sugar average; please complete anytime after 12/25/2024  -Recurrent yeast infections likely secondary to Farxiga to which we will monitor improvement on sugar average with plans to potentially initiate alternative sugar management medications

## 2024-12-18 ENCOUNTER — PHARMACY VISIT (OUTPATIENT)
Dept: PHARMACY | Facility: CLINIC | Age: 44
End: 2024-12-18
Payer: COMMERCIAL

## 2024-12-19 LAB
BACTERIA BLD CULT: NORMAL
BACTERIA BLD CULT: NORMAL

## 2024-12-23 PROCEDURE — RXMED WILLOW AMBULATORY MEDICATION CHARGE

## 2024-12-26 ENCOUNTER — PHARMACY VISIT (OUTPATIENT)
Dept: PHARMACY | Facility: CLINIC | Age: 44
End: 2024-12-26
Payer: COMMERCIAL

## 2024-12-30 ENCOUNTER — OFFICE VISIT (OUTPATIENT)
Dept: PRIMARY CARE | Facility: CLINIC | Age: 44
End: 2024-12-30
Payer: COMMERCIAL

## 2024-12-30 VITALS
SYSTOLIC BLOOD PRESSURE: 124 MMHG | HEIGHT: 68 IN | TEMPERATURE: 96.2 F | DIASTOLIC BLOOD PRESSURE: 76 MMHG | BODY MASS INDEX: 41.68 KG/M2 | WEIGHT: 275 LBS | OXYGEN SATURATION: 99 % | HEART RATE: 90 BPM

## 2024-12-30 DIAGNOSIS — E11.10 TYPE 2 DIABETES MELLITUS WITH KETOACIDOSIS WITHOUT COMA, WITHOUT LONG-TERM CURRENT USE OF INSULIN: Primary | ICD-10-CM

## 2024-12-30 DIAGNOSIS — I51.81 TAKOTSUBO CARDIOMYOPATHY: ICD-10-CM

## 2024-12-30 DIAGNOSIS — Z86.718 HISTORY OF BLOOD CLOTS: ICD-10-CM

## 2024-12-30 DIAGNOSIS — I10 PRIMARY HYPERTENSION: ICD-10-CM

## 2024-12-30 DIAGNOSIS — I50.21 ACUTE SYSTOLIC HEART FAILURE: ICD-10-CM

## 2024-12-30 LAB — POC HEMOGLOBIN A1C: 6.9 % (ref 4.2–6.5)

## 2024-12-30 PROCEDURE — 3008F BODY MASS INDEX DOCD: CPT | Performed by: FAMILY MEDICINE

## 2024-12-30 PROCEDURE — 1036F TOBACCO NON-USER: CPT | Performed by: FAMILY MEDICINE

## 2024-12-30 PROCEDURE — 83036 HEMOGLOBIN GLYCOSYLATED A1C: CPT | Performed by: FAMILY MEDICINE

## 2024-12-30 PROCEDURE — 3078F DIAST BP <80 MM HG: CPT | Performed by: FAMILY MEDICINE

## 2024-12-30 PROCEDURE — 3074F SYST BP LT 130 MM HG: CPT | Performed by: FAMILY MEDICINE

## 2024-12-30 PROCEDURE — 3046F HEMOGLOBIN A1C LEVEL >9.0%: CPT | Performed by: FAMILY MEDICINE

## 2024-12-30 PROCEDURE — 3048F LDL-C <100 MG/DL: CPT | Performed by: FAMILY MEDICINE

## 2024-12-30 PROCEDURE — 4010F ACE/ARB THERAPY RXD/TAKEN: CPT | Performed by: FAMILY MEDICINE

## 2024-12-30 PROCEDURE — 99214 OFFICE O/P EST MOD 30 MIN: CPT | Performed by: FAMILY MEDICINE

## 2024-12-30 ASSESSMENT — PATIENT HEALTH QUESTIONNAIRE - PHQ9
2. FEELING DOWN, DEPRESSED OR HOPELESS: NOT AT ALL
SUM OF ALL RESPONSES TO PHQ9 QUESTIONS 1 AND 2: 0
1. LITTLE INTEREST OR PLEASURE IN DOING THINGS: NOT AT ALL

## 2024-12-30 ASSESSMENT — PAIN SCALES - GENERAL: PAINLEVEL_OUTOF10: 0-NO PAIN

## 2024-12-30 NOTE — ASSESSMENT & PLAN NOTE
- Blood pressure stable in office today  -Will continue with compliance of current regimen along with routine cardiology follow-up as scheduled  -Advocated focus on healthy, balanced diet with moderation of salt/caffeine

## 2024-12-30 NOTE — ASSESSMENT & PLAN NOTE
- Continue on current anticoagulant therapy with cardiology/vascular specialist follow-up per protocol

## 2024-12-30 NOTE — PATIENT INSTRUCTIONS
Problem List Items Addressed This Visit             ICD-10-CM    Acute systolic heart failure I50.21     -Continue to focus medication compliance and cardiac follow-up         Type 2 diabetes mellitus with ketoacidosis without coma, without long-term current use of insulin - Primary E11.10     - A1c performed in office today which was 6.9%; this is improved compared to 9.7% in August  -Continue to focus on healthy, low-fat diet with moderation of carbohydrates and regular physical activity         Relevant Orders    POCT glycosylated hemoglobin (Hb A1C) manually resulted (Completed)    Primary hypertension I10     - Blood pressure stable in office today  -Will continue with compliance of current regimen along with routine cardiology follow-up as scheduled  -Advocated focus on healthy, balanced diet with moderation of salt/caffeine         Takotsubo cardiomyopathy I51.81     - Continue with medication regimen and routine cardiology follow-up/management as guided by specialist team         History of blood clots Z86.718     - Continue on current anticoagulant therapy with cardiology/vascular specialist follow-up per protocol            Counseling:       Medication education:         Education:  The patient is counseled regarding potential side-effects of all new medications        Understanding:  Patient expressed understanding        Adherence:  No barriers to adherence identified    ** Please excuse any errors in grammar or translation related to this dictation. Voice recognition software was utilized to prepare this document. **

## 2024-12-30 NOTE — LETTER
December 30, 2024     Patient: Josefina Huerta   YOB: 1980   Date of Visit: 12/30/2024       To Whom It May Concern:    Josefina Huerta was seen in my clinic on 12/30/2024 at 8:30 am. Please excuse Josefina for her absence from work on this day to make the appointment.    If you have any questions or concerns, please don't hesitate to call.         Sincerely,         Blake Donovan, DO        CC: No Recipients

## 2024-12-30 NOTE — PROGRESS NOTES
Outpatient Visit Note    Chief Complaint   Patient presents with    Follow-up     Med f/u         HPI:  Josefina Huerta is a 44 y.o. female who presents to the office for follow-up.  She was recently seen on 12/17/2024 for ER follow-up regarding nausea/vomiting.    In review, patient contacted office on 12/11/2024 reporting possible yeast infection.  Stated to have slight abdominal pain with nausea the day prior.  Did have 1 episode of vomiting.  Noted similar is to be the same with her last yeast infection to which Diflucan was sent ultimately, patient presented to the emergency department on 12/14/2024 stating to have nausea and vomiting for 3 days.  Stated to have had similar symptoms after taking Diflucan in the past.  She denies any abdominal pain, chest pain, shortness of breath or history of abdominal surgeries.  Stated that her last bowel movement had been 2 days prior though her intake has been low.  No reported fever or chills.  EKG was performed which showed sinus tachycardia.  Blood pressure was significantly elevated at 219/113.  Blood work was completed showing elevated white blood cell, with hyperglycemia, mild hyponatremia and a mildly elevated troponin.  Urinalysis showed prominent glucosuria with blood and ketones.  CT abdomen/pelvis was completed showing no acute abnormalities beyond chronic left renal hypodensity and left adrenal nodule.  Patient was given IV fluids, IV Zofran.  Ultimately patient was discharged home with oral Zofran and recommendation for follow-up.    At appointment she reported no significant symptom improvement when given Zofran in the emergency department.  Stated that there was issues with her IV fluids to which she was only given half a bag.  Ultimately with her evaluation, she was discharged home.  Had continued to struggle with nausea though she stated her last vomiting episode was 1 day ago.  Had been able to introduce small amounts of foods and restarted her  medication regimen over the last 2 days.  Has generalized fatigue and occasional sensations of disequilibrium, particularly if she changes positions too quickly.  No reports of headache, vision changes, sinus pressure, sore throat, cough/congestion, chest pain, shortness of breath, abdominal pain, diarrhea or constipation beyond decreased frequency of bowel movements secondary to limited intake.    Reiterated that symptoms started abruptly at work but she denies any overt sick contacts.  Had similar symptoms with prior yeast infection which led her to believe this was related to her symptoms though she denies any acute complaints of discharge or urinary complaints.  Ultimately supportive care was encouraged along with Phenergan as needed.  Orders were given for repeat A1c and metabolic panel which have yet to be completed.    Today she reports to be feeling better with no ongoing nausea, vomiting, lightheadedness or dizziness.  Was able to successfully return to baseline without any major residual issues outside of fatigue.    Current Medications  Current Outpatient Medications   Medication Instructions    atorvastatin (LIPITOR) 40 mg, oral, Daily    carvedilol (COREG) 6.25 mg, oral, 2 times daily    Eliquis 5 mg, oral, 2 times daily    Farxiga 10 mg, oral, Daily    losartan (COZAAR) 50 mg, oral, 2 times daily    metFORMIN (GLUCOPHAGE) 1,000 mg, oral, 2 times daily    miscellaneous medical supply (Blood Pressure Cuff) misc 1 Device, miscellaneous, Daily    prochlorperazine (COMPAZINE) 10 mg, oral, Every 6 hours PRN    promethazine (PHENERGAN) 12.5 mg, oral, Every 6 hours PRN    spironolactone (ALDACTONE) 25 mg, oral, Daily        Allergies  No Known Allergies     Past Medical History:   Diagnosis Date    Diabetes mellitus (Multi)     TYPE @    Hypertension     Non-STEMI (non-ST elevated myocardial infarction) (Multi)       Past Surgical History:   Procedure Laterality Date    CARDIAC CATHETERIZATION N/A 8/26/2024     Procedure: Left Heart Cath;  Surgeon: Art Rice DO;  Location: [facility] Cardiac [facility] Lab;  Service: Cardiovascular;  Laterality: N/A;     Family History   Problem Relation Name Age of Onset    Heart attack Father       Social History     Tobacco Use    Smoking status: Never    Smokeless tobacco: Never   Vaping Use    Vaping status: Never Used   Substance Use Topics    Alcohol use: Yes     Comment: rare    Drug use: Yes     Types: Marijuana, Other     Comment: Gummies/ rarely/ for sleep       ROS  All pertinent positive symptoms are included in the history of present illness.  All other systems have been reviewed and are negative and noncontributory to this patient's current ailments.    VITAL SIGNS  Vitals:    12/30/24 0832   BP: 124/76   Pulse: 90   Temp: 35.7 °C (96.2 °F)   SpO2: 99%         PHYSICAL EXAM  GENERAL APPEARANCE: alert and oriented, Pleasant and cooperative, No Acute Distress  HEENT: EOMI, PERRLA, MMM  HEART: RRR, normal S1S2, no murmurs, click or rubs  LUNGS: clear to auscultation bilaterally, no wheezes/rhonchi/rales  ABDOMEN: soft, non-tender, no organomegaly, no masses palpated  EXTREMITIES: no edema, normal ROM  SKIN: normal, no rash, unremarkable  NEUROLOGIC EXAM: non-focal exam  MUSCULOSKELETAL: no gross abnormalities  PSYCH: affect is normal, eye contact is good      Assessment/Plan   Problem List Items Addressed This Visit             ICD-10-CM    Acute systolic heart failure I50.21     -Continue to focus medication compliance and cardiac follow-up         Type 2 diabetes mellitus with ketoacidosis without coma, without long-term current use of insulin - Primary E11.10     - A1c performed in office today which was 6.9%; this is improved compared to 9.7% in August  -Continue to focus on healthy, low-fat diet with moderation of carbohydrates and regular physical activity         Relevant Orders    POCT glycosylated hemoglobin (Hb A1C) manually resulted (Completed)    Primary hypertension I10      - Blood pressure stable in office today  -Will continue with compliance of current regimen along with routine cardiology follow-up as scheduled  -Advocated focus on healthy, balanced diet with moderation of salt/caffeine         Takotsubo cardiomyopathy I51.81     - Continue with medication regimen and routine cardiology follow-up/management as guided by specialist team         History of blood clots Z86.718     - Continue on current anticoagulant therapy with cardiology/vascular specialist follow-up per protocol            Counseling:       Medication education:         Education:  The patient is counseled regarding potential side-effects of all new medications        Understanding:  Patient expressed understanding        Adherence:  No barriers to adherence identified    ** Please excuse any errors in grammar or translation related to this dictation. Voice recognition software was utilized to prepare this document. **

## 2024-12-30 NOTE — ASSESSMENT & PLAN NOTE
- A1c performed in office today which was 6.9%; this is improved compared to 9.7% in August  -Continue to focus on healthy, low-fat diet with moderation of carbohydrates and regular physical activity

## 2025-01-14 PROCEDURE — RXMED WILLOW AMBULATORY MEDICATION CHARGE

## 2025-01-15 ENCOUNTER — PHARMACY VISIT (OUTPATIENT)
Dept: PHARMACY | Facility: CLINIC | Age: 45
End: 2025-01-15
Payer: COMMERCIAL

## 2025-01-25 PROCEDURE — RXMED WILLOW AMBULATORY MEDICATION CHARGE

## 2025-01-27 ENCOUNTER — PHARMACY VISIT (OUTPATIENT)
Dept: PHARMACY | Facility: CLINIC | Age: 45
End: 2025-01-27
Payer: COMMERCIAL

## 2025-02-05 ENCOUNTER — APPOINTMENT (OUTPATIENT)
Dept: CARDIOLOGY | Facility: CLINIC | Age: 45
End: 2025-02-05
Payer: COMMERCIAL

## 2025-02-10 PROCEDURE — RXMED WILLOW AMBULATORY MEDICATION CHARGE

## 2025-02-11 ENCOUNTER — PHARMACY VISIT (OUTPATIENT)
Dept: PHARMACY | Facility: CLINIC | Age: 45
End: 2025-02-11
Payer: COMMERCIAL

## 2025-02-25 PROCEDURE — RXMED WILLOW AMBULATORY MEDICATION CHARGE

## 2025-02-27 ENCOUNTER — PHARMACY VISIT (OUTPATIENT)
Dept: PHARMACY | Facility: CLINIC | Age: 45
End: 2025-02-27
Payer: COMMERCIAL

## 2025-03-10 ENCOUNTER — APPOINTMENT (OUTPATIENT)
Dept: CARDIOLOGY | Facility: CLINIC | Age: 45
End: 2025-03-10
Payer: COMMERCIAL

## 2025-03-13 PROCEDURE — RXMED WILLOW AMBULATORY MEDICATION CHARGE

## 2025-03-18 ENCOUNTER — PHARMACY VISIT (OUTPATIENT)
Dept: PHARMACY | Facility: CLINIC | Age: 45
End: 2025-03-18
Payer: COMMERCIAL

## 2025-03-22 DIAGNOSIS — E11.10 TYPE 2 DIABETES MELLITUS WITH KETOACIDOSIS WITHOUT COMA, WITHOUT LONG-TERM CURRENT USE OF INSULIN: ICD-10-CM

## 2025-03-22 DIAGNOSIS — I50.21 ACUTE SYSTOLIC HEART FAILURE: ICD-10-CM

## 2025-03-22 DIAGNOSIS — I10 PRIMARY HYPERTENSION: ICD-10-CM

## 2025-03-22 DIAGNOSIS — E78.5 HYPERLIPIDEMIA, UNSPECIFIED: ICD-10-CM

## 2025-03-24 PROCEDURE — RXMED WILLOW AMBULATORY MEDICATION CHARGE

## 2025-03-24 RX ORDER — DAPAGLIFLOZIN 10 MG/1
10 TABLET, FILM COATED ORAL DAILY
Qty: 90 TABLET | Refills: 1 | Status: SHIPPED | OUTPATIENT
Start: 2025-03-24 | End: 2025-09-20

## 2025-03-24 RX ORDER — ATORVASTATIN CALCIUM 40 MG/1
40 TABLET, FILM COATED ORAL DAILY
Qty: 90 TABLET | Refills: 1 | Status: SHIPPED | OUTPATIENT
Start: 2025-03-24 | End: 2025-09-20

## 2025-03-24 RX ORDER — METFORMIN HYDROCHLORIDE 1000 MG/1
1000 TABLET ORAL 2 TIMES DAILY
Qty: 60 TABLET | Refills: 0 | Status: SHIPPED | OUTPATIENT
Start: 2025-03-24 | End: 2025-04-26

## 2025-03-24 RX ORDER — CARVEDILOL 6.25 MG/1
6.25 TABLET ORAL 2 TIMES DAILY
Qty: 180 TABLET | Refills: 1 | Status: SHIPPED | OUTPATIENT
Start: 2025-03-24 | End: 2025-09-20

## 2025-03-25 PROCEDURE — RXMED WILLOW AMBULATORY MEDICATION CHARGE

## 2025-03-27 ENCOUNTER — PHARMACY VISIT (OUTPATIENT)
Dept: PHARMACY | Facility: CLINIC | Age: 45
End: 2025-03-27
Payer: COMMERCIAL

## 2025-03-30 DIAGNOSIS — E11.10 TYPE 2 DIABETES MELLITUS WITH KETOACIDOSIS WITHOUT COMA, WITHOUT LONG-TERM CURRENT USE OF INSULIN: ICD-10-CM

## 2025-04-17 ENCOUNTER — APPOINTMENT (OUTPATIENT)
Dept: CARDIOLOGY | Facility: HOSPITAL | Age: 45
DRG: 641 | End: 2025-04-17
Payer: COMMERCIAL

## 2025-04-17 ENCOUNTER — APPOINTMENT (OUTPATIENT)
Dept: RADIOLOGY | Facility: HOSPITAL | Age: 45
DRG: 641 | End: 2025-04-17
Payer: COMMERCIAL

## 2025-04-17 ENCOUNTER — HOSPITAL ENCOUNTER (INPATIENT)
Facility: HOSPITAL | Age: 45
LOS: 1 days | Discharge: HOME | DRG: 641 | End: 2025-04-18
Attending: EMERGENCY MEDICINE | Admitting: INTERNAL MEDICINE
Payer: COMMERCIAL

## 2025-04-17 DIAGNOSIS — I50.21 ACUTE SYSTOLIC (CONGESTIVE) HEART FAILURE: ICD-10-CM

## 2025-04-17 DIAGNOSIS — R00.0 TACHYCARDIA: ICD-10-CM

## 2025-04-17 DIAGNOSIS — E87.6 HYPOKALEMIA: ICD-10-CM

## 2025-04-17 DIAGNOSIS — Z86.718 HISTORY OF BLOOD CLOTS: ICD-10-CM

## 2025-04-17 DIAGNOSIS — R79.89 ELEVATED SERUM CREATININE: ICD-10-CM

## 2025-04-17 DIAGNOSIS — R11.2 NAUSEA AND VOMITING, UNSPECIFIED VOMITING TYPE: Primary | ICD-10-CM

## 2025-04-17 LAB
ALBUMIN SERPL BCP-MCNC: 4.4 G/DL (ref 3.4–5)
ALP SERPL-CCNC: 76 U/L (ref 33–110)
ALT SERPL W P-5'-P-CCNC: 13 U/L (ref 7–45)
ANION GAP BLDV CALCULATED.4IONS-SCNC: 14 MMOL/L (ref 10–25)
ANION GAP SERPL CALC-SCNC: 20 MMOL/L (ref 10–20)
APPEARANCE UR: CLEAR
AST SERPL W P-5'-P-CCNC: 18 U/L (ref 9–39)
ATRIAL RATE: 140 BPM
BACTERIA #/AREA URNS AUTO: ABNORMAL /HPF
BASE EXCESS BLDV CALC-SCNC: -2 MMOL/L (ref -2–3)
BASOPHILS # BLD AUTO: 0.05 X10*3/UL (ref 0–0.1)
BASOPHILS NFR BLD AUTO: 0.2 %
BILIRUB SERPL-MCNC: 0.7 MG/DL (ref 0–1.2)
BILIRUB UR STRIP.AUTO-MCNC: NEGATIVE MG/DL
BODY TEMPERATURE: 37 DEGREES CELSIUS
BUN SERPL-MCNC: 25 MG/DL (ref 6–23)
CA-I BLDV-SCNC: 1.38 MMOL/L (ref 1.1–1.33)
CALCIUM SERPL-MCNC: 10.3 MG/DL (ref 8.6–10.3)
CHLORIDE BLDV-SCNC: 104 MMOL/L (ref 98–107)
CHLORIDE SERPL-SCNC: 104 MMOL/L (ref 98–107)
CO2 SERPL-SCNC: 15 MMOL/L (ref 21–32)
COLOR UR: ABNORMAL
CREAT SERPL-MCNC: 1.09 MG/DL (ref 0.5–1.05)
EGFRCR SERPLBLD CKD-EPI 2021: 64 ML/MIN/1.73M*2
EOSINOPHIL # BLD AUTO: 0.01 X10*3/UL (ref 0–0.7)
EOSINOPHIL NFR BLD AUTO: 0 %
ERYTHROCYTE [DISTWIDTH] IN BLOOD BY AUTOMATED COUNT: 18.1 % (ref 11.5–14.5)
GLUCOSE BLD MANUAL STRIP-MCNC: 157 MG/DL (ref 74–99)
GLUCOSE BLD MANUAL STRIP-MCNC: 245 MG/DL (ref 74–99)
GLUCOSE BLDV-MCNC: 276 MG/DL (ref 74–99)
GLUCOSE SERPL-MCNC: 246 MG/DL (ref 74–99)
GLUCOSE UR STRIP.AUTO-MCNC: ABNORMAL MG/DL
HCO3 BLDV-SCNC: 20.9 MMOL/L (ref 22–26)
HCT VFR BLD AUTO: 33.2 % (ref 36–46)
HCT VFR BLD EST: 27 % (ref 36–46)
HGB BLD-MCNC: 9 G/DL (ref 12–16)
HGB BLDV-MCNC: 9.1 G/DL (ref 12–16)
HOLD SPECIMEN: NORMAL
IMM GRANULOCYTES # BLD AUTO: 0.14 X10*3/UL (ref 0–0.7)
IMM GRANULOCYTES NFR BLD AUTO: 0.5 % (ref 0–0.9)
INHALED O2 CONCENTRATION: 21 %
KETONES UR STRIP.AUTO-MCNC: ABNORMAL MG/DL
LACTATE BLDV-SCNC: 3.3 MMOL/L (ref 0.4–2)
LACTATE SERPL-SCNC: 1.4 MMOL/L (ref 0.4–2)
LACTATE SERPL-SCNC: 2.2 MMOL/L (ref 0.4–2)
LEUKOCYTE ESTERASE UR QL STRIP.AUTO: ABNORMAL
LIPASE SERPL-CCNC: 42 U/L (ref 9–82)
LYMPHOCYTES # BLD AUTO: 1.39 X10*3/UL (ref 1.2–4.8)
LYMPHOCYTES NFR BLD AUTO: 5.1 %
MCH RBC QN AUTO: 18.4 PG (ref 26–34)
MCHC RBC AUTO-ENTMCNC: 27.1 G/DL (ref 32–36)
MCV RBC AUTO: 68 FL (ref 80–100)
MONOCYTES # BLD AUTO: 1.83 X10*3/UL (ref 0.1–1)
MONOCYTES NFR BLD AUTO: 6.7 %
MUCOUS THREADS #/AREA URNS AUTO: ABNORMAL /LPF
NEUTROPHILS # BLD AUTO: 24.03 X10*3/UL (ref 1.2–7.7)
NEUTROPHILS NFR BLD AUTO: 87.5 %
NITRITE UR QL STRIP.AUTO: NEGATIVE
NRBC BLD-RTO: 0.1 /100 WBCS (ref 0–0)
OXYHGB MFR BLDV: 93.8 % (ref 45–75)
P AXIS: 73 DEGREES
P OFFSET: 196 MS
P ONSET: 158 MS
PCO2 BLDV: 28 MM HG (ref 41–51)
PH BLDV: 7.48 PH (ref 7.33–7.43)
PH UR STRIP.AUTO: 5 [PH]
PLATELET # BLD AUTO: 739 X10*3/UL (ref 150–450)
PO2 BLDV: 70 MM HG (ref 35–45)
POTASSIUM BLDV-SCNC: 3.7 MMOL/L (ref 3.5–5.3)
POTASSIUM SERPL-SCNC: 3.7 MMOL/L (ref 3.5–5.3)
PR INTERVAL: 116 MS
PROT SERPL-MCNC: 8.2 G/DL (ref 6.4–8.2)
PROT UR STRIP.AUTO-MCNC: ABNORMAL MG/DL
Q ONSET: 216 MS
QRS COUNT: 23 BEATS
QRS DURATION: 78 MS
QT INTERVAL: 292 MS
QTC CALCULATION(BAZETT): 445 MS
QTC FREDERICIA: 387 MS
R AXIS: 173 DEGREES
RBC # BLD AUTO: 4.88 X10*6/UL (ref 4–5.2)
RBC # UR STRIP.AUTO: ABNORMAL MG/DL
RBC #/AREA URNS AUTO: ABNORMAL /HPF
SAO2 % BLDV: 96 % (ref 45–75)
SODIUM BLDV-SCNC: 135 MMOL/L (ref 136–145)
SODIUM SERPL-SCNC: 135 MMOL/L (ref 136–145)
SP GR UR STRIP.AUTO: 1.01
SQUAMOUS #/AREA URNS AUTO: ABNORMAL /HPF
T AXIS: 55 DEGREES
T OFFSET: 362 MS
UROBILINOGEN UR STRIP.AUTO-MCNC: NORMAL MG/DL
VENTRICULAR RATE: 140 BPM
WBC # BLD AUTO: 27.5 X10*3/UL (ref 4.4–11.3)
WBC #/AREA URNS AUTO: ABNORMAL /HPF

## 2025-04-17 PROCEDURE — G0378 HOSPITAL OBSERVATION PER HR: HCPCS

## 2025-04-17 PROCEDURE — 83605 ASSAY OF LACTIC ACID: CPT

## 2025-04-17 PROCEDURE — 83690 ASSAY OF LIPASE: CPT

## 2025-04-17 PROCEDURE — 96374 THER/PROPH/DIAG INJ IV PUSH: CPT

## 2025-04-17 PROCEDURE — 82947 ASSAY GLUCOSE BLOOD QUANT: CPT

## 2025-04-17 PROCEDURE — 71046 X-RAY EXAM CHEST 2 VIEWS: CPT

## 2025-04-17 PROCEDURE — 84132 ASSAY OF SERUM POTASSIUM: CPT

## 2025-04-17 PROCEDURE — 2500000004 HC RX 250 GENERAL PHARMACY W/ HCPCS (ALT 636 FOR OP/ED): Mod: JZ

## 2025-04-17 PROCEDURE — 71046 X-RAY EXAM CHEST 2 VIEWS: CPT | Performed by: RADIOLOGY

## 2025-04-17 PROCEDURE — 93005 ELECTROCARDIOGRAM TRACING: CPT

## 2025-04-17 PROCEDURE — 1200000002 HC GENERAL ROOM WITH TELEMETRY DAILY

## 2025-04-17 PROCEDURE — 2500000004 HC RX 250 GENERAL PHARMACY W/ HCPCS (ALT 636 FOR OP/ED): Mod: JZ | Performed by: INTERNAL MEDICINE

## 2025-04-17 PROCEDURE — 85025 COMPLETE CBC W/AUTO DIFF WBC: CPT

## 2025-04-17 PROCEDURE — 87086 URINE CULTURE/COLONY COUNT: CPT | Mod: AHULAB

## 2025-04-17 PROCEDURE — 36415 COLL VENOUS BLD VENIPUNCTURE: CPT

## 2025-04-17 PROCEDURE — 81001 URINALYSIS AUTO W/SCOPE: CPT

## 2025-04-17 PROCEDURE — 74018 RADEX ABDOMEN 1 VIEW: CPT

## 2025-04-17 PROCEDURE — 99285 EMERGENCY DEPT VISIT HI MDM: CPT | Mod: 25 | Performed by: EMERGENCY MEDICINE

## 2025-04-17 PROCEDURE — 96361 HYDRATE IV INFUSION ADD-ON: CPT

## 2025-04-17 PROCEDURE — 2500000001 HC RX 250 WO HCPCS SELF ADMINISTERED DRUGS (ALT 637 FOR MEDICARE OP): Performed by: INTERNAL MEDICINE

## 2025-04-17 PROCEDURE — 74018 RADEX ABDOMEN 1 VIEW: CPT | Performed by: RADIOLOGY

## 2025-04-17 PROCEDURE — 96375 TX/PRO/DX INJ NEW DRUG ADDON: CPT

## 2025-04-17 RX ORDER — ACETAMINOPHEN 650 MG/1
650 SUPPOSITORY RECTAL EVERY 4 HOURS PRN
Status: DISCONTINUED | OUTPATIENT
Start: 2025-04-17 | End: 2025-04-18

## 2025-04-17 RX ORDER — DEXTROSE 50 % IN WATER (D50W) INTRAVENOUS SYRINGE
25
Status: DISCONTINUED | OUTPATIENT
Start: 2025-04-17 | End: 2025-04-18 | Stop reason: HOSPADM

## 2025-04-17 RX ORDER — DEXTROSE 50 % IN WATER (D50W) INTRAVENOUS SYRINGE
12.5
Status: DISCONTINUED | OUTPATIENT
Start: 2025-04-17 | End: 2025-04-18 | Stop reason: HOSPADM

## 2025-04-17 RX ORDER — ACETAMINOPHEN 325 MG/1
650 TABLET ORAL EVERY 4 HOURS PRN
Status: DISCONTINUED | OUTPATIENT
Start: 2025-04-17 | End: 2025-04-18

## 2025-04-17 RX ORDER — ACETAMINOPHEN 650 MG/1
650 SUPPOSITORY RECTAL EVERY 4 HOURS PRN
Status: DISCONTINUED | OUTPATIENT
Start: 2025-04-17 | End: 2025-04-18 | Stop reason: HOSPADM

## 2025-04-17 RX ORDER — LOSARTAN POTASSIUM 50 MG/1
50 TABLET ORAL 2 TIMES DAILY
Status: DISCONTINUED | OUTPATIENT
Start: 2025-04-17 | End: 2025-04-18 | Stop reason: HOSPADM

## 2025-04-17 RX ORDER — ATORVASTATIN CALCIUM 40 MG/1
40 TABLET, FILM COATED ORAL NIGHTLY
Status: DISCONTINUED | OUTPATIENT
Start: 2025-04-17 | End: 2025-04-18 | Stop reason: HOSPADM

## 2025-04-17 RX ORDER — ONDANSETRON HYDROCHLORIDE 2 MG/ML
4 INJECTION, SOLUTION INTRAVENOUS ONCE
Status: DISCONTINUED | OUTPATIENT
Start: 2025-04-17 | End: 2025-04-17

## 2025-04-17 RX ORDER — PROMETHAZINE HYDROCHLORIDE 25 MG/1
25 SUPPOSITORY RECTAL EVERY 12 HOURS PRN
Status: DISCONTINUED | OUTPATIENT
Start: 2025-04-17 | End: 2025-04-18 | Stop reason: HOSPADM

## 2025-04-17 RX ORDER — ONDANSETRON HYDROCHLORIDE 2 MG/ML
4 INJECTION, SOLUTION INTRAVENOUS ONCE
Status: COMPLETED | OUTPATIENT
Start: 2025-04-17 | End: 2025-04-17

## 2025-04-17 RX ORDER — ENOXAPARIN SODIUM 100 MG/ML
40 INJECTION SUBCUTANEOUS EVERY 24 HOURS
Status: DISCONTINUED | OUTPATIENT
Start: 2025-04-17 | End: 2025-04-18

## 2025-04-17 RX ORDER — SODIUM CHLORIDE 9 MG/ML
100 INJECTION, SOLUTION INTRAVENOUS CONTINUOUS
Status: DISCONTINUED | OUTPATIENT
Start: 2025-04-17 | End: 2025-04-18 | Stop reason: HOSPADM

## 2025-04-17 RX ORDER — CARVEDILOL 6.25 MG/1
6.25 TABLET ORAL 2 TIMES DAILY
Status: DISCONTINUED | OUTPATIENT
Start: 2025-04-17 | End: 2025-04-18 | Stop reason: HOSPADM

## 2025-04-17 RX ORDER — ACETAMINOPHEN 160 MG/5ML
650 SOLUTION ORAL EVERY 4 HOURS PRN
Status: DISCONTINUED | OUTPATIENT
Start: 2025-04-17 | End: 2025-04-18 | Stop reason: HOSPADM

## 2025-04-17 RX ORDER — PROMETHAZINE HYDROCHLORIDE 25 MG/1
25 TABLET ORAL EVERY 6 HOURS PRN
Status: DISCONTINUED | OUTPATIENT
Start: 2025-04-17 | End: 2025-04-18 | Stop reason: HOSPADM

## 2025-04-17 RX ORDER — ACETAMINOPHEN 160 MG/5ML
650 SOLUTION ORAL EVERY 4 HOURS PRN
Status: DISCONTINUED | OUTPATIENT
Start: 2025-04-17 | End: 2025-04-18

## 2025-04-17 RX ORDER — METOCLOPRAMIDE HYDROCHLORIDE 5 MG/ML
10 INJECTION INTRAMUSCULAR; INTRAVENOUS ONCE
Status: COMPLETED | OUTPATIENT
Start: 2025-04-17 | End: 2025-04-17

## 2025-04-17 RX ORDER — TALC
3 POWDER (GRAM) TOPICAL NIGHTLY PRN
Status: DISCONTINUED | OUTPATIENT
Start: 2025-04-17 | End: 2025-04-18 | Stop reason: HOSPADM

## 2025-04-17 RX ORDER — PANTOPRAZOLE SODIUM 40 MG/10ML
40 INJECTION, POWDER, LYOPHILIZED, FOR SOLUTION INTRAVENOUS ONCE
Status: COMPLETED | OUTPATIENT
Start: 2025-04-17 | End: 2025-04-17

## 2025-04-17 RX ORDER — ACETAMINOPHEN 325 MG/1
650 TABLET ORAL EVERY 4 HOURS PRN
Status: DISCONTINUED | OUTPATIENT
Start: 2025-04-17 | End: 2025-04-18 | Stop reason: HOSPADM

## 2025-04-17 RX ORDER — INSULIN LISPRO 100 [IU]/ML
0-5 INJECTION, SOLUTION INTRAVENOUS; SUBCUTANEOUS
Status: DISCONTINUED | OUTPATIENT
Start: 2025-04-18 | End: 2025-04-18 | Stop reason: HOSPADM

## 2025-04-17 RX ORDER — POLYETHYLENE GLYCOL 3350 17 G/17G
17 POWDER, FOR SOLUTION ORAL DAILY
Status: DISCONTINUED | OUTPATIENT
Start: 2025-04-18 | End: 2025-04-18 | Stop reason: HOSPADM

## 2025-04-17 RX ADMIN — CARVEDILOL 6.25 MG: 6.25 TABLET, FILM COATED ORAL at 22:22

## 2025-04-17 RX ADMIN — SODIUM CHLORIDE, SODIUM LACTATE, POTASSIUM CHLORIDE, AND CALCIUM CHLORIDE 1000 ML: .6; .31; .03; .02 INJECTION, SOLUTION INTRAVENOUS at 10:29

## 2025-04-17 RX ADMIN — ONDANSETRON 4 MG: 2 INJECTION, SOLUTION INTRAMUSCULAR; INTRAVENOUS at 10:29

## 2025-04-17 RX ADMIN — APIXABAN 5 MG: 5 TABLET, FILM COATED ORAL at 22:23

## 2025-04-17 RX ADMIN — SODIUM CHLORIDE 100 ML/HR: 0.9 INJECTION, SOLUTION INTRAVENOUS at 20:42

## 2025-04-17 RX ADMIN — PANTOPRAZOLE SODIUM 40 MG: 40 INJECTION, POWDER, FOR SOLUTION INTRAVENOUS at 10:29

## 2025-04-17 RX ADMIN — LOSARTAN POTASSIUM 50 MG: 50 TABLET, FILM COATED ORAL at 22:22

## 2025-04-17 RX ADMIN — ATORVASTATIN CALCIUM 40 MG: 40 TABLET, FILM COATED ORAL at 22:22

## 2025-04-17 RX ADMIN — PIPERACILLIN SODIUM AND TAZOBACTAM SODIUM 3.38 G: 3; .375 INJECTION, SOLUTION INTRAVENOUS at 20:46

## 2025-04-17 RX ADMIN — METOCLOPRAMIDE 10 MG: 5 INJECTION, SOLUTION INTRAMUSCULAR; INTRAVENOUS at 11:51

## 2025-04-17 RX ADMIN — SODIUM CHLORIDE, SODIUM LACTATE, POTASSIUM CHLORIDE, AND CALCIUM CHLORIDE 1000 ML: .6; .31; .03; .02 INJECTION, SOLUTION INTRAVENOUS at 11:51

## 2025-04-17 SDOH — SOCIAL STABILITY: SOCIAL INSECURITY: DO YOU FEEL UNSAFE GOING BACK TO THE PLACE WHERE YOU ARE LIVING?: NO

## 2025-04-17 SDOH — SOCIAL STABILITY: SOCIAL INSECURITY: DOES ANYONE TRY TO KEEP YOU FROM HAVING/CONTACTING OTHER FRIENDS OR DOING THINGS OUTSIDE YOUR HOME?: NO

## 2025-04-17 SDOH — SOCIAL STABILITY: SOCIAL INSECURITY: DO YOU FEEL ANYONE HAS EXPLOITED OR TAKEN ADVANTAGE OF YOU FINANCIALLY OR OF YOUR PERSONAL PROPERTY?: NO

## 2025-04-17 SDOH — SOCIAL STABILITY: SOCIAL INSECURITY: ARE YOU OR HAVE YOU BEEN THREATENED OR ABUSED PHYSICALLY, EMOTIONALLY, OR SEXUALLY BY ANYONE?: NO

## 2025-04-17 SDOH — SOCIAL STABILITY: SOCIAL INSECURITY: ABUSE: ADULT

## 2025-04-17 SDOH — SOCIAL STABILITY: SOCIAL INSECURITY: HAVE YOU HAD ANY THOUGHTS OF HARMING ANYONE ELSE?: NO

## 2025-04-17 SDOH — SOCIAL STABILITY: SOCIAL INSECURITY: ARE THERE ANY APPARENT SIGNS OF INJURIES/BEHAVIORS THAT COULD BE RELATED TO ABUSE/NEGLECT?: NO

## 2025-04-17 SDOH — SOCIAL STABILITY: SOCIAL INSECURITY: HAVE YOU HAD THOUGHTS OF HARMING ANYONE ELSE?: NO

## 2025-04-17 SDOH — SOCIAL STABILITY: SOCIAL INSECURITY: HAS ANYONE EVER THREATENED TO HURT YOUR FAMILY OR YOUR PETS?: NO

## 2025-04-17 SDOH — SOCIAL STABILITY: SOCIAL INSECURITY: WERE YOU ABLE TO COMPLETE ALL THE BEHAVIORAL HEALTH SCREENINGS?: YES

## 2025-04-17 ASSESSMENT — ACTIVITIES OF DAILY LIVING (ADL)
HEARING - LEFT EAR: FUNCTIONAL
GROOMING: INDEPENDENT
WALKS IN HOME: INDEPENDENT
FEEDING YOURSELF: INDEPENDENT
PATIENT'S MEMORY ADEQUATE TO SAFELY COMPLETE DAILY ACTIVITIES?: YES
ADEQUATE_TO_COMPLETE_ADL: YES
JUDGMENT_ADEQUATE_SAFELY_COMPLETE_DAILY_ACTIVITIES: YES
HEARING - RIGHT EAR: FUNCTIONAL
DRESSING YOURSELF: INDEPENDENT
BATHING: INDEPENDENT
TOILETING: INDEPENDENT

## 2025-04-17 ASSESSMENT — LIFESTYLE VARIABLES
PRESCIPTION_ABUSE_PAST_12_MONTHS: NO
SUBSTANCE_ABUSE_PAST_12_MONTHS: NO
AUDIT-C TOTAL SCORE: 2
HOW MANY STANDARD DRINKS CONTAINING ALCOHOL DO YOU HAVE ON A TYPICAL DAY: 1 OR 2
SKIP TO QUESTIONS 9-10: 0
HOW OFTEN DO YOU HAVE A DRINK CONTAINING ALCOHOL: MONTHLY OR LESS
AUDIT-C TOTAL SCORE: 2
HOW OFTEN DO YOU HAVE 6 OR MORE DRINKS ON ONE OCCASION: LESS THAN MONTHLY

## 2025-04-17 ASSESSMENT — PAIN SCALES - GENERAL
PAINLEVEL_OUTOF10: 5 - MODERATE PAIN
PAINLEVEL_OUTOF10: 0 - NO PAIN

## 2025-04-17 ASSESSMENT — COLUMBIA-SUICIDE SEVERITY RATING SCALE - C-SSRS
6. HAVE YOU EVER DONE ANYTHING, STARTED TO DO ANYTHING, OR PREPARED TO DO ANYTHING TO END YOUR LIFE?: NO
2. HAVE YOU ACTUALLY HAD ANY THOUGHTS OF KILLING YOURSELF?: NO
1. IN THE PAST MONTH, HAVE YOU WISHED YOU WERE DEAD OR WISHED YOU COULD GO TO SLEEP AND NOT WAKE UP?: NO
1. IN THE PAST MONTH, HAVE YOU WISHED YOU WERE DEAD OR WISHED YOU COULD GO TO SLEEP AND NOT WAKE UP?: NO
2. HAVE YOU ACTUALLY HAD ANY THOUGHTS OF KILLING YOURSELF?: NO
6. HAVE YOU EVER DONE ANYTHING, STARTED TO DO ANYTHING, OR PREPARED TO DO ANYTHING TO END YOUR LIFE?: NO

## 2025-04-17 ASSESSMENT — PAIN - FUNCTIONAL ASSESSMENT
PAIN_FUNCTIONAL_ASSESSMENT: 0-10
PAIN_FUNCTIONAL_ASSESSMENT: 0-10

## 2025-04-17 NOTE — ED PROVIDER NOTES
CC: Nausea and Vomiting     History provided by: Patient  Limitations to History: None    HPI:    Patient is a 44-year-old female with a PMH of nausea and vomiting, T2DM, hypertension, Takotsubo cardiomyopathy, provoked DVT on Eliquis, and recent type II NSTEMI secondary to diabetic ketoacidosis who presents to the emergency department for chief complaint of nausea and vomiting.  She reports that she has had 3 days of nausea and nonbloody nonbilious vomiting.  She is unable to tolerate p.o. intake.  Patient denies abdominal pain, chest pain, shortness of breath, fevers, chills, or diarrhea.    External Records Reviewed: Previous ED records, inpatient records, outpatient records  ???????????????????????????????????????????????????????????????  Triage Vitals:  T 37 °C (98.6 °F)  HR (!) 136  BP (!) 186/121  RR 18  O2 98 %      Physical Exam  Constitutional:       General: She is awake. She is not in acute distress.     Appearance: She is not ill-appearing or toxic-appearing.   HENT:      Head: Normocephalic and atraumatic.   Eyes:      Extraocular Movements: Extraocular movements intact.      Conjunctiva/sclera: Conjunctivae normal.   Cardiovascular:      Rate and Rhythm: Regular rhythm. Tachycardia present.      Pulses:           Radial pulses are 2+ on the right side and 2+ on the left side.        Dorsalis pedis pulses are 2+ on the right side and 2+ on the left side.      Heart sounds: Normal heart sounds, S1 normal and S2 normal. Heart sounds not distant.   Pulmonary:      Effort: Pulmonary effort is normal. No respiratory distress.      Breath sounds: Normal breath sounds.      Comments: No evidence of wheezing or crackles.  Abdominal:      General: Abdomen is protuberant. There is no distension.      Palpations: Abdomen is soft.      Tenderness: There is no abdominal tenderness. There is no guarding or rebound.   Musculoskeletal:      Right lower leg: No edema.      Left lower leg: No edema.   Skin:      General: Skin is warm and dry.      Capillary Refill: Capillary refill takes less than 2 seconds.   Neurological:      Mental Status: She is alert and oriented to person, place, and time.   Psychiatric:         Behavior: Behavior is cooperative.        ???????????????????????????????????????????????????????????????  ED Course/Treatment/Medical Decision Making    EKG Interpretation:  Sinus tachycardia. Rate of 140 bpm. Normal axis. Normal intervals. No acute ST elevations, depressions, or T wave inversions.     Independent Interpretation of Studies:  I independently interpreted: EKG      Social Determinants Limiting Care:  None identified         ED Course:  Diagnoses as of 04/18/25 2135   Nausea and vomiting, unspecified vomiting type   Tachycardia       MDM:    Patient is a 44-year-old female with above PMH who presents to the emergency department for chief complaint of nausea and vomiting.  Upon arrival to the emergency department patient's vital signs remarkable for tachycardia and hypertension, she is nontoxic-appearing, and appears in no acute distress.  Upon examination patient has no abdominal tenderness or guarding.  The patient's nausea will be treated with Zofran.  I suspect her tachycardia related to hypovolemia in the setting of multiple days of nausea/vomiting.  She will be given 1 L of lactated Ringer's.  Initial VBG was remarkable for a lactate of 3.3.  Lipase is within normal limits therefore pancreatitis is less likely.  CMP is grossly unremarkable.  CBC is remarkable for leukocytosis 27.5 and platelets 739.  I suspect this is likely related to the patient's hypovolemia. Urinalysis is without evidence of urinary tract infection.  Upon reevaluation the patient is still nauseous, tachycardic, but with a benign abdominal examination.  At this time we will give her Reglan and 1 additional liter of lactated Ringer's.  She will be admitted to the internal medicine service in stable condition for further  management.    Impression:  Nausea vomiting  Tachycardia    Disposition:  Admit to internal medicine    Jewel Stevenson, DO   Emergency Medicine, PGY-2      Procedures ? SmartLinks last updated 4/17/2025 9:59 AM          Jewel Stevenson,   Resident  04/18/25 [id number]

## 2025-04-17 NOTE — PROGRESS NOTES
Pharmacy Medication History     Source of Information: Patient     Additional concerns with the patient's PTA list.   N/A  Notified Provider via Haiku : No    The following updates were made to the Prior to Admission medication list:     Medications ADDED:   N/A  Medications CHANGED:  N/A  Medications REMOVED:   N/A  Medications NOT TAKING:   Prochlorperazine 10 mg tablet  Promethazine 12.5 mg tablet     Allergy reviewed : Yes    Meds 2 Beds : Yes    Outpatient pharmacy confirmed and updated in chart : Yes    Pharmacy name: [facility]     The list below reflectives the updated PTA list. Please review each medication in order reconciliation for additional clarification and justification.    Prior to Admission Medications   Prescriptions Last Dose   apixaban (Eliquis) 5 mg tablet 2025 Morning   Sig: Take 1 tablet (5 mg) by mouth 2 times a day.   atorvastatin (Lipitor) 40 mg tablet 2025 Evening   Sig: Take 1 tablet (40 mg) by mouth once daily.   carvedilol (Coreg) 6.25 mg tablet 2025 Morning   Sig: Take 1 tablet (6.25 mg) by mouth 2 times a day.   dapagliflozin propanediol (Farxiga) 10 mg 2025 Morning   Sig: Take 1 tablet (10 mg) by mouth once daily.   losartan (Cozaar) 50 mg tablet 2025 Morning   Sig: Take 1 tablet (50 mg) by mouth 2 times a day.   metFORMIN (Glucophage) 1,000 mg tablet 2025 Morning   Sig: Take 1 tablet (1,000 mg) by mouth 2 times a day.   miscellaneous medical supply (Blood Pressure Cuff) misc    Si Device once daily.   prochlorperazine (Compazine) 10 mg tablet Not Taking   Sig: Take 1 tablet (10 mg) by mouth every 6 hours if needed for nausea or vomiting.   Patient not taking: Reported on 2025   promethazine (Phenergan) 12.5 mg tablet Not Taking   Sig: Take 1 tablet (12.5 mg) by mouth every 6 hours if needed for nausea or vomiting.   Patient not taking: Reported on 2025   spironolactone (Aldactone) 25 mg tablet 2025 Morning   Sig:  Take 1 tablet (25 mg) by mouth once daily.      Facility-Administered Medications: None       The list below reflectives the updated allergy list. Please review each documented allergy for additional clarification and justification.    No Known Allergies       04/17/25 at 4:29 PM - Gill Kennedy

## 2025-04-18 ENCOUNTER — APPOINTMENT (OUTPATIENT)
Dept: RADIOLOGY | Facility: HOSPITAL | Age: 45
DRG: 641 | End: 2025-04-18
Payer: COMMERCIAL

## 2025-04-18 ENCOUNTER — APPOINTMENT (OUTPATIENT)
Dept: CARDIOLOGY | Facility: HOSPITAL | Age: 45
DRG: 641 | End: 2025-04-18
Payer: COMMERCIAL

## 2025-04-18 VITALS
WEIGHT: 260 LBS | SYSTOLIC BLOOD PRESSURE: 169 MMHG | RESPIRATION RATE: 18 BRPM | HEIGHT: 68 IN | DIASTOLIC BLOOD PRESSURE: 99 MMHG | TEMPERATURE: 97 F | BODY MASS INDEX: 39.4 KG/M2 | OXYGEN SATURATION: 100 % | HEART RATE: 89 BPM

## 2025-04-18 LAB
AMPHETAMINES UR QL SCN: ABNORMAL
ANION GAP SERPL CALC-SCNC: 12 MMOL/L (ref 10–20)
ANION GAP SERPL CALC-SCNC: 14 MMOL/L (ref 10–20)
AORTIC VALVE MEAN GRADIENT: 4 MMHG
AORTIC VALVE PEAK VELOCITY: 1.32 M/S
AV PEAK GRADIENT: 7 MMHG
AVA (PEAK VEL): 2.97 CM2
AVA (VTI): 3.32 CM2
BARBITURATES UR QL SCN: ABNORMAL
BENZODIAZ UR QL SCN: ABNORMAL
BUN SERPL-MCNC: 24 MG/DL (ref 6–23)
BUN SERPL-MCNC: 25 MG/DL (ref 6–23)
BZE UR QL SCN: ABNORMAL
CALCIUM SERPL-MCNC: 8.9 MG/DL (ref 8.6–10.3)
CALCIUM SERPL-MCNC: 9.1 MG/DL (ref 8.6–10.3)
CANNABINOIDS UR QL SCN: ABNORMAL
CHLORIDE SERPL-SCNC: 106 MMOL/L (ref 98–107)
CHLORIDE SERPL-SCNC: 108 MMOL/L (ref 98–107)
CO2 SERPL-SCNC: 21 MMOL/L (ref 21–32)
CO2 SERPL-SCNC: 22 MMOL/L (ref 21–32)
CREAT SERPL-MCNC: 1.15 MG/DL (ref 0.5–1.05)
CREAT SERPL-MCNC: 1.32 MG/DL (ref 0.5–1.05)
EGFRCR SERPLBLD CKD-EPI 2021: 51 ML/MIN/1.73M*2
EGFRCR SERPLBLD CKD-EPI 2021: 60 ML/MIN/1.73M*2
EJECTION FRACTION APICAL 4 CHAMBER: 62.2
EJECTION FRACTION: 68 %
ERYTHROCYTE [DISTWIDTH] IN BLOOD BY AUTOMATED COUNT: 17.9 % (ref 11.5–14.5)
ERYTHROCYTE [DISTWIDTH] IN BLOOD BY AUTOMATED COUNT: 18 % (ref 11.5–14.5)
FENTANYL+NORFENTANYL UR QL SCN: ABNORMAL
GLUCOSE BLD MANUAL STRIP-MCNC: 113 MG/DL (ref 74–99)
GLUCOSE BLD MANUAL STRIP-MCNC: 161 MG/DL (ref 74–99)
GLUCOSE BLD MANUAL STRIP-MCNC: 171 MG/DL (ref 74–99)
GLUCOSE SERPL-MCNC: 118 MG/DL (ref 74–99)
GLUCOSE SERPL-MCNC: 196 MG/DL (ref 74–99)
HCT VFR BLD AUTO: 29.3 % (ref 36–46)
HCT VFR BLD AUTO: 29.9 % (ref 36–46)
HGB BLD-MCNC: 7.9 G/DL (ref 12–16)
HGB BLD-MCNC: 8 G/DL (ref 12–16)
LACTATE SERPL-SCNC: 1.2 MMOL/L (ref 0.4–2)
LEFT ATRIUM VOLUME AREA LENGTH INDEX BSA: 31.2 ML/M2
LEFT VENTRICLE INTERNAL DIMENSION DIASTOLE: 5.47 CM (ref 3.5–6)
LEFT VENTRICULAR OUTFLOW TRACT DIAMETER: 2.02 CM
MCH RBC QN AUTO: 18.5 PG (ref 26–34)
MCH RBC QN AUTO: 18.9 PG (ref 26–34)
MCHC RBC AUTO-ENTMCNC: 26.4 G/DL (ref 32–36)
MCHC RBC AUTO-ENTMCNC: 27.3 G/DL (ref 32–36)
MCV RBC AUTO: 69 FL (ref 80–100)
MCV RBC AUTO: 70 FL (ref 80–100)
METHADONE UR QL SCN: ABNORMAL
MITRAL VALVE E/A RATIO: 0.77
NRBC BLD-RTO: 0 /100 WBCS (ref 0–0)
NRBC BLD-RTO: 0 /100 WBCS (ref 0–0)
OPIATES UR QL SCN: ABNORMAL
OXYCODONE+OXYMORPHONE UR QL SCN: ABNORMAL
PCP UR QL SCN: ABNORMAL
PLATELET # BLD AUTO: 516 X10*3/UL (ref 150–450)
PLATELET # BLD AUTO: 543 X10*3/UL (ref 150–450)
POTASSIUM SERPL-SCNC: 3.3 MMOL/L (ref 3.5–5.3)
POTASSIUM SERPL-SCNC: 3.5 MMOL/L (ref 3.5–5.3)
RBC # BLD AUTO: 4.24 X10*6/UL (ref 4–5.2)
RBC # BLD AUTO: 4.26 X10*6/UL (ref 4–5.2)
RIGHT VENTRICLE FREE WALL PEAK S': 11 CM/S
SODIUM SERPL-SCNC: 136 MMOL/L (ref 136–145)
SODIUM SERPL-SCNC: 140 MMOL/L (ref 136–145)
TRICUSPID ANNULAR PLANE SYSTOLIC EXCURSION: 2.1 CM
WBC # BLD AUTO: 11.7 X10*3/UL (ref 4.4–11.3)
WBC # BLD AUTO: 12.6 X10*3/UL (ref 4.4–11.3)

## 2025-04-18 PROCEDURE — 93306 TTE W/DOPPLER COMPLETE: CPT

## 2025-04-18 PROCEDURE — 80048 BASIC METABOLIC PNL TOTAL CA: CPT

## 2025-04-18 PROCEDURE — 74176 CT ABD & PELVIS W/O CONTRAST: CPT | Performed by: RADIOLOGY

## 2025-04-18 PROCEDURE — 85027 COMPLETE CBC AUTOMATED: CPT | Performed by: INTERNAL MEDICINE

## 2025-04-18 PROCEDURE — 2500000002 HC RX 250 W HCPCS SELF ADMINISTERED DRUGS (ALT 637 FOR MEDICARE OP, ALT 636 FOR OP/ED)

## 2025-04-18 PROCEDURE — 36415 COLL VENOUS BLD VENIPUNCTURE: CPT | Performed by: INTERNAL MEDICINE

## 2025-04-18 PROCEDURE — 2500000002 HC RX 250 W HCPCS SELF ADMINISTERED DRUGS (ALT 637 FOR MEDICARE OP, ALT 636 FOR OP/ED): Performed by: INTERNAL MEDICINE

## 2025-04-18 PROCEDURE — 80048 BASIC METABOLIC PNL TOTAL CA: CPT | Performed by: INTERNAL MEDICINE

## 2025-04-18 PROCEDURE — 80307 DRUG TEST PRSMV CHEM ANLYZR: CPT | Performed by: INTERNAL MEDICINE

## 2025-04-18 PROCEDURE — 83605 ASSAY OF LACTIC ACID: CPT | Performed by: INTERNAL MEDICINE

## 2025-04-18 PROCEDURE — 85027 COMPLETE CBC AUTOMATED: CPT

## 2025-04-18 PROCEDURE — 80349 CANNABINOIDS NATURAL: CPT | Performed by: INTERNAL MEDICINE

## 2025-04-18 PROCEDURE — 2500000004 HC RX 250 GENERAL PHARMACY W/ HCPCS (ALT 636 FOR OP/ED): Mod: JZ | Performed by: INTERNAL MEDICINE

## 2025-04-18 PROCEDURE — 74176 CT ABD & PELVIS W/O CONTRAST: CPT

## 2025-04-18 PROCEDURE — 87040 BLOOD CULTURE FOR BACTERIA: CPT | Mod: AHULAB | Performed by: INTERNAL MEDICINE

## 2025-04-18 PROCEDURE — G0378 HOSPITAL OBSERVATION PER HR: HCPCS

## 2025-04-18 PROCEDURE — 82947 ASSAY GLUCOSE BLOOD QUANT: CPT

## 2025-04-18 PROCEDURE — 2500000001 HC RX 250 WO HCPCS SELF ADMINISTERED DRUGS (ALT 637 FOR MEDICARE OP): Performed by: INTERNAL MEDICINE

## 2025-04-18 RX ORDER — POTASSIUM CHLORIDE 20 MEQ/1
40 TABLET, EXTENDED RELEASE ORAL ONCE
Status: COMPLETED | OUTPATIENT
Start: 2025-04-18 | End: 2025-04-18

## 2025-04-18 RX ADMIN — APIXABAN 5 MG: 5 TABLET, FILM COATED ORAL at 08:05

## 2025-04-18 RX ADMIN — LOSARTAN POTASSIUM 50 MG: 50 TABLET, FILM COATED ORAL at 08:05

## 2025-04-18 RX ADMIN — POTASSIUM CHLORIDE 40 MEQ: 1500 TABLET, EXTENDED RELEASE ORAL at 17:23

## 2025-04-18 RX ADMIN — PIPERACILLIN SODIUM AND TAZOBACTAM SODIUM 3.38 G: 3; .375 INJECTION, SOLUTION INTRAVENOUS at 03:10

## 2025-04-18 RX ADMIN — PIPERACILLIN SODIUM AND TAZOBACTAM SODIUM 3.38 G: 3; .375 INJECTION, SOLUTION INTRAVENOUS at 08:05

## 2025-04-18 RX ADMIN — INSULIN LISPRO 1 UNITS: 100 INJECTION, SOLUTION INTRAVENOUS; SUBCUTANEOUS at 17:23

## 2025-04-18 RX ADMIN — INSULIN LISPRO 1 UNITS: 100 INJECTION, SOLUTION INTRAVENOUS; SUBCUTANEOUS at 13:15

## 2025-04-18 RX ADMIN — CARVEDILOL 6.25 MG: 6.25 TABLET, FILM COATED ORAL at 08:05

## 2025-04-18 SDOH — ECONOMIC STABILITY: FOOD INSECURITY: HOW HARD IS IT FOR YOU TO PAY FOR THE VERY BASICS LIKE FOOD, HOUSING, MEDICAL CARE, AND HEATING?: NOT VERY HARD

## 2025-04-18 SDOH — ECONOMIC STABILITY: TRANSPORTATION INSECURITY: IN THE PAST 12 MONTHS, HAS LACK OF TRANSPORTATION KEPT YOU FROM MEDICAL APPOINTMENTS OR FROM GETTING MEDICATIONS?: NO

## 2025-04-18 SDOH — ECONOMIC STABILITY: HOUSING INSECURITY: IN THE LAST 12 MONTHS, WAS THERE A TIME WHEN YOU WERE NOT ABLE TO PAY THE MORTGAGE OR RENT ON TIME?: NO

## 2025-04-18 SDOH — SOCIAL STABILITY: SOCIAL INSECURITY: ARE YOU MARRIED, WIDOWED, DIVORCED, SEPARATED, NEVER MARRIED, OR LIVING WITH A PARTNER?: MARRIED

## 2025-04-18 SDOH — ECONOMIC STABILITY: HOUSING INSECURITY: AT ANY TIME IN THE PAST 12 MONTHS, WERE YOU HOMELESS OR LIVING IN A SHELTER (INCLUDING NOW)?: NO

## 2025-04-18 ASSESSMENT — ENCOUNTER SYMPTOMS
ACTIVITY CHANGE: 0
NAUSEA: 1
ARTHRALGIAS: 0
DYSURIA: 0
COLOR CHANGE: 0
ABDOMINAL PAIN: 0
APNEA: 0
ANAL BLEEDING: 0
APPETITE CHANGE: 1
CHILLS: 0
BACK PAIN: 0
VOMITING: 1
AGITATION: 0
ABDOMINAL DISTENTION: 0
HEADACHES: 0
DIFFICULTY URINATING: 0
FACIAL ASYMMETRY: 0
CHEST TIGHTNESS: 0
DIAPHORESIS: 0
FATIGUE: 0

## 2025-04-18 ASSESSMENT — ACTIVITIES OF DAILY LIVING (ADL): LACK_OF_TRANSPORTATION: NO

## 2025-04-18 NOTE — PROGRESS NOTES
04/18/25 0719   Discharge Planning   Living Arrangements Spouse/significant other   Support Systems Spouse/significant other;Family members;Friends/neighbors   Type of Residence Private residence   Do you have animals or pets at home? No   Who is requesting discharge planning? Patient   Home or Post Acute Services None   Expected Discharge Disposition Home   Financial Resource Strain   How hard is it for you to pay for the very basics like food, housing, medical care, and heating? Not very   Housing Stability   In the last 12 months, was there a time when you were not able to pay the mortgage or rent on time? N   At any time in the past 12 months, were you homeless or living in a shelter (including now)? N   Transportation Needs   In the past 12 months, has lack of transportation kept you from medical appointments or from getting medications? no   In the past 12 months, has lack of transportation kept you from meetings, work, or from getting things needed for daily living? No   Patient Choice   Provider Choice list and CMS website (https://medicare.gov/care-compare#search) for post-acute Quality and Resource Measure Data were provided and reviewed with: Patient     I met with this patient at her bedside to discuss discharge planing, she is currently sleeping, per notes patient is alertx3 at her baseline she is independent with her ADL's lives at home with her , on DME reported , has complaints of N/V on Iv zosyn pending KUB and Chest Xray, I am anticipating patient to discharge home with no needs. I will continue to monitor for discharge planing.

## 2025-04-18 NOTE — H&P
History Of Present Illness  Josefina Huerta is a 44 y.o. female presenting with severe nausea vomiting for 2 days.  She is a 44-year-old female, who is known to have history of type 2 diabetes, hypertension, also history of Takotsubo cardiomyopathy, and history of left heart cath in the past, came to the emergency department, because she has been having severe nausea vomiting, no abdominal pain, no diarrhea, for the last 1 day, she vomited many times, she came to the emergency department, her white blood cell count was 27,000 her lactate was hide she is admitted here for further management.  She was given IV fluid she was observed she is doing much better today.  .     Past Medical History  She has a past medical history of Diabetes mellitus (Multi), Hypertension, and Non-STEMI (non-ST elevated myocardial infarction) (Multi).    Surgical History  She has a past surgical history that includes Cardiac catheterization (N/A, 8/26/2024).     Social History  She reports that she has never smoked. She has never used smokeless tobacco. She reports current alcohol use. She reports current drug use. Drugs: Marijuana and Other.    Family History  Family History[1]     Allergies  Patient has no known allergies.    Review of Systems   Constitutional:  Positive for appetite change. Negative for activity change, chills, diaphoresis and fatigue.   HENT:  Negative for congestion and dental problem.    Respiratory:  Negative for apnea and chest tightness.    Cardiovascular:  Negative for chest pain and leg swelling.   Gastrointestinal:  Positive for nausea and vomiting. Negative for abdominal distention, abdominal pain and anal bleeding.   Genitourinary:  Negative for difficulty urinating and dysuria.   Musculoskeletal:  Negative for arthralgias, back pain and gait problem.   Skin:  Negative for color change and pallor.   Neurological:  Negative for facial asymmetry and headaches.   Psychiatric/Behavioral:  Negative for agitation and  "behavioral problems.         Physical Exam  Constitutional:       Appearance: She is not ill-appearing.   Cardiovascular:      Rate and Rhythm: Normal rate and regular rhythm.      Heart sounds: No murmur heard.  Pulmonary:      Effort: Pulmonary effort is normal.      Breath sounds: Normal breath sounds.   Abdominal:      General: Abdomen is flat. There is no distension.      Palpations: Abdomen is soft. There is no mass.      Tenderness: There is abdominal tenderness. There is rebound. There is no guarding.      Hernia: No hernia is present.   Musculoskeletal:      Cervical back: Normal range of motion and neck supple.   Skin:     Coloration: Skin is pale.   Neurological:      General: No focal deficit present.      Mental Status: She is alert and oriented to person, place, and time.          Last Recorded Vitals  Blood pressure 144/89, pulse 96, temperature 36.7 °C (98.1 °F), temperature source Temporal, resp. rate 18, height 1.727 m (5' 8\"), weight 118 kg (260 lb), SpO2 100%.    Relevant Results  Results for orders placed or performed during the hospital encounter of 04/17/25 (from the past 96 hours)   POCT GLUCOSE   Result Value Ref Range    POCT Glucose 245 (H) 74 - 99 mg/dL   ECG 12 lead   Result Value Ref Range    Ventricular Rate 140 BPM    Atrial Rate 140 BPM    MT Interval 116 ms    QRS Duration 78 ms    QT Interval 292 ms    QTC Calculation(Bazett) 445 ms    P Axis 73 degrees    R Axis 173 degrees    T Axis 55 degrees    QRS Count 23 beats    Q Onset 216 ms    P Onset 158 ms    P Offset 196 ms    T Offset 362 ms    QTC Fredericia 387 ms   BLOOD GAS VENOUS FULL PANEL   Result Value Ref Range    POCT pH, Venous 7.48 (H) 7.33 - 7.43 pH    POCT pCO2, Venous 28 (L) 41 - 51 mm Hg    POCT pO2, Venous 70 (H) 35 - 45 mm Hg    POCT SO2, Venous 96 (H) 45 - 75 %    POCT Oxy Hemoglobin, Venous 93.8 (H) 45.0 - 75.0 %    POCT Hematocrit Calculated, Venous 27.0 (L) 36.0 - 46.0 %    POCT Sodium, Venous 135 (L) 136 - 145 " mmol/L    POCT Potassium, Venous 3.7 3.5 - 5.3 mmol/L    POCT Chloride, Venous 104 98 - 107 mmol/L    POCT Ionized Calicum, Venous 1.38 (H) 1.10 - 1.33 mmol/L    POCT Glucose, Venous 276 (H) 74 - 99 mg/dL    POCT Lactate, Venous 3.3 (H) 0.4 - 2.0 mmol/L    POCT Base Excess, Venous -2.0 -2.0 - 3.0 mmol/L    POCT HCO3 Calculated, Venous 20.9 (L) 22.0 - 26.0 mmol/L    POCT Hemoglobin, Venous 9.1 (L) 12.0 - 16.0 g/dL    POCT Anion Gap, Venous 14.0 10.0 - 25.0 mmol/L    Patient Temperature 37.0 degrees Celsius    FiO2 21 %   Gray Top   Result Value Ref Range    Extra Tube Hold for add-ons.    Lipase   Result Value Ref Range    Lipase 42 9 - 82 U/L   Comprehensive metabolic panel   Result Value Ref Range    Glucose 246 (H) 74 - 99 mg/dL    Sodium 135 (L) 136 - 145 mmol/L    Potassium 3.7 3.5 - 5.3 mmol/L    Chloride 104 98 - 107 mmol/L    Bicarbonate 15 (L) 21 - 32 mmol/L    Anion Gap 20 10 - 20 mmol/L    Urea Nitrogen 25 (H) 6 - 23 mg/dL    Creatinine 1.09 (H) 0.50 - 1.05 mg/dL    eGFR 64 >60 mL/min/1.73m*2    Calcium 10.3 8.6 - 10.3 mg/dL    Albumin 4.4 3.4 - 5.0 g/dL    Alkaline Phosphatase 76 33 - 110 U/L    Total Protein 8.2 6.4 - 8.2 g/dL    AST 18 9 - 39 U/L    Bilirubin, Total 0.7 0.0 - 1.2 mg/dL    ALT 13 7 - 45 U/L   CBC and Auto Differential   Result Value Ref Range    WBC 27.5 (H) 4.4 - 11.3 x10*3/uL    nRBC 0.1 (H) 0.0 - 0.0 /100 WBCs    RBC 4.88 4.00 - 5.20 x10*6/uL    Hemoglobin 9.0 (L) 12.0 - 16.0 g/dL    Hematocrit 33.2 (L) 36.0 - 46.0 %    MCV 68 (L) 80 - 100 fL    MCH 18.4 (L) 26.0 - 34.0 pg    MCHC 27.1 (L) 32.0 - 36.0 g/dL    RDW 18.1 (H) 11.5 - 14.5 %    Platelets 739 (H) 150 - 450 x10*3/uL    Neutrophils % 87.5 40.0 - 80.0 %    Immature Granulocytes %, Automated 0.5 0.0 - 0.9 %    Lymphocytes % 5.1 13.0 - 44.0 %    Monocytes % 6.7 2.0 - 10.0 %    Eosinophils % 0.0 0.0 - 6.0 %    Basophils % 0.2 0.0 - 2.0 %    Neutrophils Absolute 24.03 (H) 1.20 - 7.70 x10*3/uL    Immature Granulocytes Absolute,  Automated 0.14 0.00 - 0.70 x10*3/uL    Lymphocytes Absolute 1.39 1.20 - 4.80 x10*3/uL    Monocytes Absolute 1.83 (H) 0.10 - 1.00 x10*3/uL    Eosinophils Absolute 0.01 0.00 - 0.70 x10*3/uL    Basophils Absolute 0.05 0.00 - 0.10 x10*3/uL   Urinalysis with Reflex Culture and Microscopic   Result Value Ref Range    Color, Urine Light-Yellow Light-Yellow, Yellow, Dark-Yellow    Appearance, Urine Clear Clear    Specific Gravity, Urine 1.015 1.005 - 1.035    pH, Urine 5.0 5.0, 5.5, 6.0, 6.5, 7.0, 7.5, 8.0    Protein, Urine 50 (1+) (A) NEGATIVE, 10 (TRACE), 20 (TRACE) mg/dL    Glucose, Urine OVER (4+) (A) Normal mg/dL    Blood, Urine 0.03 (TRACE) (A) NEGATIVE mg/dL    Ketones, Urine 20 (1+) (A) NEGATIVE mg/dL    Bilirubin, Urine NEGATIVE NEGATIVE mg/dL    Urobilinogen, Urine Normal Normal mg/dL    Nitrite, Urine NEGATIVE NEGATIVE    Leukocyte Esterase, Urine 75 Isaac/uL (A) NEGATIVE   Microscopic Only, Urine   Result Value Ref Range    WBC, Urine 6-10 (A) 1-5, NONE /HPF    RBC, Urine 3-5 NONE, 1-2, 3-5 /HPF    Squamous Epithelial Cells, Urine 1-9 (SPARSE) Reference range not established. /HPF    Bacteria, Urine 1+ (A) NONE SEEN /HPF    Mucus, Urine FEW Reference range not established. /LPF   Lactate   Result Value Ref Range    Lactate 2.2 (H) 0.4 - 2.0 mmol/L   Lactate   Result Value Ref Range    Lactate 1.4 0.4 - 2.0 mmol/L   POCT GLUCOSE   Result Value Ref Range    POCT Glucose 157 (H) 74 - 99 mg/dL   CBC   Result Value Ref Range    WBC 12.6 (H) 4.4 - 11.3 x10*3/uL    nRBC 0.0 0.0 - 0.0 /100 WBCs    RBC 4.24 4.00 - 5.20 x10*6/uL    Hemoglobin 8.0 (L) 12.0 - 16.0 g/dL    Hematocrit 29.3 (L) 36.0 - 46.0 %    MCV 69 (L) 80 - 100 fL    MCH 18.9 (L) 26.0 - 34.0 pg    MCHC 27.3 (L) 32.0 - 36.0 g/dL    RDW 17.9 (H) 11.5 - 14.5 %    Platelets 516 (H) 150 - 450 x10*3/uL   Basic Metabolic Panel   Result Value Ref Range    Glucose 118 (H) 74 - 99 mg/dL    Sodium 140 136 - 145 mmol/L    Potassium 3.5 3.5 - 5.3 mmol/L    Chloride 108  (H) 98 - 107 mmol/L    Bicarbonate 22 21 - 32 mmol/L    Anion Gap 14 10 - 20 mmol/L    Urea Nitrogen 25 (H) 6 - 23 mg/dL    Creatinine 1.15 (H) 0.50 - 1.05 mg/dL    eGFR 60 (L) >60 mL/min/1.73m*2    Calcium 9.1 8.6 - 10.3 mg/dL   Lactate   Result Value Ref Range    Lactate 1.2 0.4 - 2.0 mmol/L   POCT GLUCOSE   Result Value Ref Range    POCT Glucose 113 (H) 74 - 99 mg/dL      XR chest 2 views  Result Date: 4/18/2025  Interpreted By:  Parker Talley, STUDY: XR CHEST 2 VIEWS;  4/17/2025 8:00 pm   INDICATION: Signs/Symptoms:leukocytosis.   COMPARISON: CT scan abdomen and pelvis from 12/14/2024. Prior chest x-ray from 08/23/2024.   ACCESSION NUMBER(S): [id number]   ORDERING CLINICIAN: YURIY RAMOS   TECHNIQUE: PA and lateral views of the chest were obtained.   FINDINGS: MEDIASTINUM/LUNGS/ANGELA: No cardiomegaly, vascular congestion, or pleural effusion. No abnormal opacity in either lung worrisome for tumor or pneumonia. No pneumothorax. No tracheal deviation. No abnormal hilar fullness or gross mass on either side.   BONES: No lytic or blastic destructive bone lesion. Mild multilevel mid to distal thoracic spine disc space narrowing with endplate osteophytosis.   UPPER ABDOMEN: Grossly intact.       Thoracic spine DJD as described. Otherwise, negative exam.   MACRO: None   Signed by: Parker Talley 4/18/2025 7:59 AM Dictation workstation:   [id number]    XR abdomen 1 view  Result Date: 4/18/2025  Interpreted By:  Parker Talley, STUDY: XR ABDOMEN 1 VIEW;  4/17/2025 8:00 pm   INDICATION: Signs/Symptoms:emesia and leukocytosis.   COMPARISON: Prior exam from 12/14/2020.   ACCESSION NUMBER(S): [id number]   ORDERING CLINICIAN: YURIY RAMOS   TECHNIQUE:     3 supine views of the abdomen and pelvis were obtained.   FINDINGS: There was   a mild amount of retained colonic stool and gas.  . There was no abnormal small bowel dilatation. There was no gross organomegaly. Several left-sided pelvic phleboliths.  No destructive bone lesion. The extreme lung bases were clear. Mild elevation of the right diaphragm.       Mild retained colonic stool.   MACRO: None   Signed by: Parker Talley 4/18/2025 7:57 AM Dictation workstation:   [id number]    ECG 12 lead  Result Date: 4/17/2025  Sinus tachycardia Right axis deviation Septal infarct (cited on or before 23-AUG-2024) Abnormal ECG When compared with ECG of 14-DEC-2024 14:51, Questionable change in QRS axis See ED provider note for full interpretation and clinical correlation Confirmed by Luna Caldera ([id number]) on 4/17/2025 11:50:03 AM      Medications  Scheduled Medications[2]   PRN Medications[3]     Assessment/Plan   Principal Problem:    Nausea and vomiting, unspecified vomiting type      44-year-old female, who is known to have history of hypertension, type 2 diabetes, history of DKA in the past for not taking any medications, history of Takotsubo cardiomyopathy, with ejection fraction of 30 to 35% in the past, came to the emergency department with severe nausea vomiting over 1 day, no abdomen pain, no fever,.  Leukocytosis, today is 12,000, and she is feeling much better, we will be discontinuing the antibiotics.  As she had severe emesis and drop in hemoglobin I will be doing a CAT scan of the abdomen pelvis.  Diabetes for now with a history of high lactate and holding the metformin, we will be restarting as an outpatient, also the Farxiga can be started as an outpatient     Hypertension we will be continuing the same.  Overall she is clinically doing much better, she is requesting that she need to go home today, we will be repeating the labs, we will wait for the CAT scan of the abdomen pelvis, if these are normal I will be sending her home that she can have a follow-up with the PCP soon which she agreed  Also noted drop in the hemoglobin, it still could have been secondary to hemodilution, no obvious bleeding, also noted to be having thrombocytosis, have advised  her to see the hematologist as an outpatient    I spent 65 minutes in the professional and overall care of this patient.    Deysi Corona MD       [1]   Family History  Problem Relation Name Age of Onset    Heart attack Father     [2] apixaban, 5 mg, oral, BID  atorvastatin, 40 mg, oral, Nightly  carvedilol, 6.25 mg, oral, BID  insulin lispro, 0-5 Units, subcutaneous, TID AC  losartan, 50 mg, oral, BID  polyethylene glycol, 17 g, oral, Daily  [3] PRN medications: acetaminophen **OR** acetaminophen **OR** acetaminophen, dextrose, dextrose, glucagon, glucagon, melatonin, promethazine **OR** promethazine

## 2025-04-18 NOTE — CARE PLAN
"The patient's goals for the shift include \"get some water\"    The clinical goals for the shift include remain HDS    "

## 2025-04-19 LAB — BACTERIA UR CULT: NORMAL

## 2025-04-20 LAB — BACTERIA BLD CULT: NORMAL

## 2025-04-21 ENCOUNTER — PHARMACY VISIT (OUTPATIENT)
Dept: PHARMACY | Facility: CLINIC | Age: 45
End: 2025-04-21
Payer: COMMERCIAL

## 2025-04-21 DIAGNOSIS — E11.10 TYPE 2 DIABETES MELLITUS WITH KETOACIDOSIS WITHOUT COMA, WITHOUT LONG-TERM CURRENT USE OF INSULIN: ICD-10-CM

## 2025-04-21 DIAGNOSIS — I26.99 ACUTE PULMONARY EMBOLISM, UNSPECIFIED PULMONARY EMBOLISM TYPE, UNSPECIFIED WHETHER ACUTE COR PULMONALE PRESENT (MULTI): ICD-10-CM

## 2025-04-21 DIAGNOSIS — R79.89 ELEVATED SERUM CREATININE: ICD-10-CM

## 2025-04-21 DIAGNOSIS — E87.6 HYPOKALEMIA: ICD-10-CM

## 2025-04-21 PROCEDURE — RXMED WILLOW AMBULATORY MEDICATION CHARGE

## 2025-04-21 RX ORDER — METFORMIN HYDROCHLORIDE 1000 MG/1
1000 TABLET ORAL 2 TIMES DAILY
Qty: 60 TABLET | Refills: 0 | Status: CANCELLED | OUTPATIENT
Start: 2025-04-21 | End: 2025-05-21

## 2025-04-21 RX ORDER — APIXABAN 5 MG/1
5 TABLET, FILM COATED ORAL 2 TIMES DAILY
Qty: 180 TABLET | Refills: 1 | Status: CANCELLED | OUTPATIENT
Start: 2025-04-21 | End: 2025-10-18

## 2025-04-21 RX ORDER — METFORMIN HYDROCHLORIDE 1000 MG/1
1000 TABLET ORAL 2 TIMES DAILY
Qty: 60 TABLET | Refills: 0 | Status: SHIPPED | OUTPATIENT
Start: 2025-04-21 | End: 2025-05-28

## 2025-04-22 LAB
ANION GAP SERPL CALCULATED.4IONS-SCNC: 12 MMOL/L (CALC) (ref 7–17)
BACTERIA BLD CULT: NORMAL
BUN SERPL-MCNC: 15 MG/DL (ref 7–25)
BUN/CREAT SERPL: 14 (CALC) (ref 6–22)
CALCIUM SERPL-MCNC: 9.3 MG/DL (ref 8.6–10.2)
CHLORIDE SERPL-SCNC: 108 MMOL/L (ref 98–110)
CO2 SERPL-SCNC: 18 MMOL/L (ref 20–32)
CREAT SERPL-MCNC: 1.04 MG/DL (ref 0.5–0.99)
EGFRCR SERPLBLD CKD-EPI 2021: 68 ML/MIN/1.73M2
GLUCOSE SERPL-MCNC: 122 MG/DL (ref 65–139)
MAGNESIUM SERPL-MCNC: 1.7 MG/DL (ref 1.5–2.5)
POTASSIUM SERPL-SCNC: 4 MMOL/L (ref 3.5–5.3)
SODIUM SERPL-SCNC: 138 MMOL/L (ref 135–146)

## 2025-04-22 PROCEDURE — RXMED WILLOW AMBULATORY MEDICATION CHARGE

## 2025-04-23 DIAGNOSIS — I26.99 ACUTE PULMONARY EMBOLISM, UNSPECIFIED PULMONARY EMBOLISM TYPE, UNSPECIFIED WHETHER ACUTE COR PULMONALE PRESENT (MULTI): ICD-10-CM

## 2025-04-23 PROCEDURE — RXMED WILLOW AMBULATORY MEDICATION CHARGE

## 2025-04-24 LAB — CARBOXYTHC UR-MCNC: >500 NG/ML

## 2025-04-28 ENCOUNTER — PHARMACY VISIT (OUTPATIENT)
Dept: PHARMACY | Facility: CLINIC | Age: 45
End: 2025-04-28
Payer: COMMERCIAL

## 2025-05-04 NOTE — DISCHARGE SUMMARY
Discharge Diagnosis  Cyclical vomiting secondary to cannabinoid use           Issues Requiring Follow-Up  PCP  For further follow-up.  Noted to be having thrombocytosis she can follow-up with the hematologist as an outpatient    Discharge Meds     Medication List      CONTINUE taking these medications     atorvastatin 40 mg tablet; Commonly known as: Lipitor; Take 1 tablet (40   mg) by mouth once daily.   Blood Pressure Cuff misc; Generic drug: miscellaneous medical supply; 1   Device once daily.   carvedilol 6.25 mg tablet; Commonly known as: Coreg; Take 1 tablet (6.25   mg) by mouth 2 times a day.   Farxiga 10 mg tablet; Generic drug: dapagliflozin propanediol; Take 1   tablet (10 mg) by mouth once daily.   losartan 50 mg tablet; Commonly known as: Cozaar; Take 1 tablet (50 mg)   by mouth 2 times a day.   spironolactone 25 mg tablet; Commonly known as: Aldactone; Take 1 tablet   (25 mg) by mouth once daily.     STOP taking these medications     prochlorperazine 10 mg tablet; Commonly known as: Compazine   promethazine 12.5 mg tablet; Commonly known as: Phenergan       Test Results Pending At Discharge  Pending Labs       Order Current Status    Extra Urine Gray Tube Collected (04/17/25 4116)    Urinalysis with Reflex Culture and Microscopic In process            Hospital Course   44-year-old female, who is known to have history of hypertension, type 2 diabetes, history of DKA in the past for not taking any medications, history of Takotsubo cardiomyopathy, with ejection fraction of 30 to 35% in the past, came to the emergency department with severe nausea vomiting over 1 day, no abdomen pain, no fever,.  Leukocytosis, today is 12,000, and she is feeling much better, we will be discontinuing the antibiotics.  As she had severe emesis and drop in hemoglobin I will be doing a CAT scan of the abdomen pelvis.  Diabetes for now with a history of high lactate and holding the metformin, we will be restarting as an  outpatient, also the Farxiga can be started as an outpatient     Hypertension we will be continuing the same.  Overall she is clinically doing much better, she is requesting that she need to go home today, we will be repeating the labs, we will wait for the CAT scan of the abdomen pelvis, if these are normal I will be sending her home that she can have a follow-up with the PCP soon which she agreed  Also noted drop in the hemoglobin, it still could have been secondary to hemodilution, no obvious bleeding, also noted to be having thrombocytosis, have advised her to see the hematologist as an outpatient     She was also advised to refrain from cannabinoid use  Pertinent Physical Exam At Time of Discharge  Physical Exam  Please see my H&P  Outpatient Follow-Up  No future appointments.  Discharge time more than 35 minutes    Deysi Corona MD

## 2025-05-11 DIAGNOSIS — I26.99 ACUTE PULMONARY EMBOLISM, UNSPECIFIED PULMONARY EMBOLISM TYPE, UNSPECIFIED WHETHER ACUTE COR PULMONALE PRESENT (MULTI): ICD-10-CM

## 2025-05-11 DIAGNOSIS — I10 PRIMARY HYPERTENSION: ICD-10-CM

## 2025-05-11 RX ORDER — SPIRONOLACTONE 25 MG/1
25 TABLET ORAL DAILY
Qty: 90 TABLET | Refills: 1 | Status: CANCELLED | OUTPATIENT
Start: 2025-05-11 | End: 2025-11-07

## 2025-05-12 DIAGNOSIS — I10 PRIMARY HYPERTENSION: ICD-10-CM

## 2025-05-12 DIAGNOSIS — I26.99 ACUTE PULMONARY EMBOLISM, UNSPECIFIED PULMONARY EMBOLISM TYPE, UNSPECIFIED WHETHER ACUTE COR PULMONALE PRESENT (MULTI): ICD-10-CM

## 2025-05-13 PROCEDURE — RXMED WILLOW AMBULATORY MEDICATION CHARGE

## 2025-05-13 RX ORDER — SPIRONOLACTONE 25 MG/1
25 TABLET ORAL DAILY
Qty: 90 TABLET | Refills: 0 | Status: SHIPPED | OUTPATIENT
Start: 2025-05-13 | End: 2025-11-09

## 2025-05-15 DIAGNOSIS — I10 PRIMARY HYPERTENSION: ICD-10-CM

## 2025-05-15 DIAGNOSIS — I50.21 ACUTE SYSTOLIC HEART FAILURE: ICD-10-CM

## 2025-05-15 PROCEDURE — RXMED WILLOW AMBULATORY MEDICATION CHARGE

## 2025-05-15 RX ORDER — LOSARTAN POTASSIUM 50 MG/1
50 TABLET ORAL 2 TIMES DAILY
Qty: 180 TABLET | Refills: 1 | Status: CANCELLED | OUTPATIENT
Start: 2025-05-15 | End: 2025-11-11

## 2025-05-16 PROCEDURE — RXMED WILLOW AMBULATORY MEDICATION CHARGE

## 2025-05-16 RX ORDER — LOSARTAN POTASSIUM 50 MG/1
50 TABLET ORAL 2 TIMES DAILY
Qty: 180 TABLET | Refills: 1 | Status: SHIPPED | OUTPATIENT
Start: 2025-05-16 | End: 2025-11-12

## 2025-05-20 ENCOUNTER — PHARMACY VISIT (OUTPATIENT)
Dept: PHARMACY | Facility: CLINIC | Age: 45
End: 2025-05-20
Payer: COMMERCIAL

## 2025-05-28 PROCEDURE — RXMED WILLOW AMBULATORY MEDICATION CHARGE

## 2025-05-31 ENCOUNTER — PHARMACY VISIT (OUTPATIENT)
Dept: PHARMACY | Facility: CLINIC | Age: 45
End: 2025-05-31
Payer: COMMERCIAL

## 2025-06-14 DIAGNOSIS — I26.99 ACUTE PULMONARY EMBOLISM, UNSPECIFIED PULMONARY EMBOLISM TYPE, UNSPECIFIED WHETHER ACUTE COR PULMONALE PRESENT (MULTI): ICD-10-CM

## 2025-06-14 PROCEDURE — RXMED WILLOW AMBULATORY MEDICATION CHARGE

## 2025-06-20 ENCOUNTER — PHARMACY VISIT (OUTPATIENT)
Dept: PHARMACY | Facility: CLINIC | Age: 45
End: 2025-06-20
Payer: COMMERCIAL

## 2025-06-21 PROCEDURE — RXMED WILLOW AMBULATORY MEDICATION CHARGE

## 2025-06-28 ENCOUNTER — PHARMACY VISIT (OUTPATIENT)
Dept: PHARMACY | Facility: CLINIC | Age: 45
End: 2025-06-28
Payer: COMMERCIAL

## 2025-07-07 DIAGNOSIS — I26.99 ACUTE PULMONARY EMBOLISM, UNSPECIFIED PULMONARY EMBOLISM TYPE, UNSPECIFIED WHETHER ACUTE COR PULMONALE PRESENT (MULTI): ICD-10-CM

## 2025-07-08 DIAGNOSIS — I26.99 ACUTE PULMONARY EMBOLISM, UNSPECIFIED PULMONARY EMBOLISM TYPE, UNSPECIFIED WHETHER ACUTE COR PULMONALE PRESENT (MULTI): ICD-10-CM

## 2025-07-08 PROCEDURE — RXMED WILLOW AMBULATORY MEDICATION CHARGE

## 2025-07-10 DIAGNOSIS — I26.99 ACUTE PULMONARY EMBOLISM, UNSPECIFIED PULMONARY EMBOLISM TYPE, UNSPECIFIED WHETHER ACUTE COR PULMONALE PRESENT (MULTI): ICD-10-CM

## 2025-07-14 DIAGNOSIS — I26.99 ACUTE PULMONARY EMBOLISM, UNSPECIFIED PULMONARY EMBOLISM TYPE, UNSPECIFIED WHETHER ACUTE COR PULMONALE PRESENT (MULTI): ICD-10-CM

## 2025-07-14 PROCEDURE — RXMED WILLOW AMBULATORY MEDICATION CHARGE

## 2025-07-15 PROCEDURE — RXMED WILLOW AMBULATORY MEDICATION CHARGE

## 2025-07-17 ENCOUNTER — PHARMACY VISIT (OUTPATIENT)
Dept: PHARMACY | Facility: CLINIC | Age: 45
End: 2025-07-17
Payer: COMMERCIAL

## 2025-07-21 ENCOUNTER — PHARMACY VISIT (OUTPATIENT)
Dept: PHARMACY | Facility: CLINIC | Age: 45
End: 2025-07-21
Payer: COMMERCIAL

## 2025-08-02 DIAGNOSIS — I10 PRIMARY HYPERTENSION: ICD-10-CM

## 2025-08-02 PROCEDURE — RXMED WILLOW AMBULATORY MEDICATION CHARGE

## 2025-08-02 RX ORDER — SPIRONOLACTONE 25 MG/1
25 TABLET ORAL DAILY
Qty: 90 TABLET | Refills: 0 | Status: CANCELLED | OUTPATIENT
Start: 2025-08-02 | End: 2026-01-29

## 2025-08-04 ENCOUNTER — PHARMACY VISIT (OUTPATIENT)
Dept: PHARMACY | Facility: CLINIC | Age: 45
End: 2025-08-04
Payer: COMMERCIAL

## 2025-08-04 DIAGNOSIS — I10 PRIMARY HYPERTENSION: ICD-10-CM

## 2025-08-04 RX ORDER — SPIRONOLACTONE 25 MG/1
25 TABLET ORAL DAILY
Qty: 90 TABLET | Refills: 0 | Status: CANCELLED | OUTPATIENT
Start: 2025-08-02 | End: 2026-01-29

## 2025-08-06 DIAGNOSIS — I10 PRIMARY HYPERTENSION: ICD-10-CM

## 2025-08-06 RX ORDER — SPIRONOLACTONE 25 MG/1
25 TABLET ORAL DAILY
Qty: 90 TABLET | Refills: 0 | Status: CANCELLED | OUTPATIENT
Start: 2025-08-02 | End: 2026-01-29

## 2025-08-08 DIAGNOSIS — I10 PRIMARY HYPERTENSION: ICD-10-CM

## 2025-08-09 RX ORDER — SPIRONOLACTONE 25 MG/1
25 TABLET ORAL DAILY
Qty: 90 TABLET | Refills: 0 | OUTPATIENT
Start: 2025-08-09 | End: 2026-02-05

## 2025-08-10 DIAGNOSIS — I10 PRIMARY HYPERTENSION: ICD-10-CM

## 2025-08-10 RX ORDER — SPIRONOLACTONE 25 MG/1
25 TABLET ORAL DAILY
Qty: 90 TABLET | Refills: 0 | OUTPATIENT
Start: 2025-08-10 | End: 2026-02-06

## 2025-08-25 PROCEDURE — RXMED WILLOW AMBULATORY MEDICATION CHARGE

## 2025-08-26 ENCOUNTER — PHARMACY VISIT (OUTPATIENT)
Dept: PHARMACY | Facility: CLINIC | Age: 45
End: 2025-08-26
Payer: COMMERCIAL

## 2025-08-26 PROCEDURE — RXMED WILLOW AMBULATORY MEDICATION CHARGE

## (undated) DEVICE — GLIDESHEATH, SLENDER 6FR, 10CM, NITINOL KIT

## (undated) DEVICE — GUIDEWIRE, J TIP, 3 MM, 0.035 IN X 260 CM, PTFE

## (undated) DEVICE — KIT, NAMIC STANDARD LEFT HEART, CUSTOM, LWMC

## (undated) DEVICE — CATHETER, EXPO, MODEL-D, 6FR FL3.5

## (undated) DEVICE — GLIDEWIRE, ANGLE, STANDARD, .035 X 180CM, 1.5MM J-TIP

## (undated) DEVICE — TR BAND, RADIAL COMPRESSION, LONG, 29CM

## (undated) DEVICE — Device

## (undated) DEVICE — PACK, ANGIO P2, CUSTOM, LAKE

## (undated) DEVICE — CATHETER, EXPO, MODEL-D, 6FR PIG 6-HOLE